# Patient Record
Sex: FEMALE | Race: WHITE | NOT HISPANIC OR LATINO | Employment: OTHER | URBAN - METROPOLITAN AREA
[De-identification: names, ages, dates, MRNs, and addresses within clinical notes are randomized per-mention and may not be internally consistent; named-entity substitution may affect disease eponyms.]

---

## 2020-03-26 ASSESSMENT — MIFFLIN-ST. JEOR
SCORE: 1432.91
SCORE: 1485.08

## 2020-03-29 ENCOUNTER — SURGERY - HEALTHEAST (OUTPATIENT)
Dept: GASTROENTEROLOGY | Facility: HOSPITAL | Age: 85
End: 2020-03-29

## 2020-04-01 ENCOUNTER — HOME CARE/HOSPICE - HEALTHEAST (OUTPATIENT)
Dept: HOME HEALTH SERVICES | Facility: HOME HEALTH | Age: 85
End: 2020-04-01

## 2020-04-02 ENCOUNTER — COMMUNICATION - HEALTHEAST (OUTPATIENT)
Dept: SCHEDULING | Facility: CLINIC | Age: 85
End: 2020-04-02

## 2020-04-04 ENCOUNTER — HOME CARE/HOSPICE - HEALTHEAST (OUTPATIENT)
Dept: HOME HEALTH SERVICES | Facility: HOME HEALTH | Age: 85
End: 2020-04-04

## 2020-04-07 ENCOUNTER — HOME CARE/HOSPICE - HEALTHEAST (OUTPATIENT)
Dept: HOME HEALTH SERVICES | Facility: HOME HEALTH | Age: 85
End: 2020-04-07

## 2020-04-10 ENCOUNTER — HOME CARE/HOSPICE - HEALTHEAST (OUTPATIENT)
Dept: HOME HEALTH SERVICES | Facility: HOME HEALTH | Age: 85
End: 2020-04-10

## 2020-04-11 ENCOUNTER — RECORDS - HEALTHEAST (OUTPATIENT)
Dept: LAB | Facility: HOSPITAL | Age: 85
End: 2020-04-11

## 2020-04-11 ENCOUNTER — HOME CARE/HOSPICE - HEALTHEAST (OUTPATIENT)
Dept: HOME HEALTH SERVICES | Facility: HOME HEALTH | Age: 85
End: 2020-04-11

## 2020-04-11 LAB
ANION GAP SERPL CALCULATED.3IONS-SCNC: 3 MMOL/L (ref 5–18)
BUN SERPL-MCNC: 9 MG/DL (ref 8–28)
CALCIUM SERPL-MCNC: 8.3 MG/DL (ref 8.5–10.5)
CHLORIDE BLD-SCNC: 104 MMOL/L (ref 98–107)
CO2 SERPL-SCNC: 34 MMOL/L (ref 22–31)
CREAT SERPL-MCNC: 0.85 MG/DL (ref 0.6–1.1)
ERYTHROCYTE [DISTWIDTH] IN BLOOD BY AUTOMATED COUNT: 25.3 % (ref 11–14.5)
GFR SERPL CREATININE-BSD FRML MDRD: >60 ML/MIN/1.73M2
GLUCOSE BLD-MCNC: 96 MG/DL (ref 70–125)
HCT VFR BLD AUTO: 30 % (ref 35–47)
HGB BLD-MCNC: 9.5 G/DL (ref 12–16)
MCH RBC QN AUTO: 35.6 PG (ref 27–34)
MCHC RBC AUTO-ENTMCNC: 31.7 G/DL (ref 32–36)
MCV RBC AUTO: 112 FL (ref 80–100)
PLATELET # BLD AUTO: 165 THOU/UL (ref 140–440)
PMV BLD AUTO: 12.3 FL (ref 8.5–12.5)
POTASSIUM BLD-SCNC: 4.4 MMOL/L (ref 3.5–5)
RBC # BLD AUTO: 2.67 MILL/UL (ref 3.8–5.4)
SODIUM SERPL-SCNC: 141 MMOL/L (ref 136–145)
WBC: 3.5 THOU/UL (ref 4–11)

## 2020-04-14 ENCOUNTER — HOME CARE/HOSPICE - HEALTHEAST (OUTPATIENT)
Dept: HOME HEALTH SERVICES | Facility: HOME HEALTH | Age: 85
End: 2020-04-14

## 2020-04-16 ENCOUNTER — HOME CARE/HOSPICE - HEALTHEAST (OUTPATIENT)
Dept: HOME HEALTH SERVICES | Facility: HOME HEALTH | Age: 85
End: 2020-04-16

## 2020-04-17 ENCOUNTER — HOME CARE/HOSPICE - HEALTHEAST (OUTPATIENT)
Dept: HOME HEALTH SERVICES | Facility: HOME HEALTH | Age: 85
End: 2020-04-17

## 2021-04-30 ENCOUNTER — COMMUNICATION - HEALTHEAST (OUTPATIENT)
Dept: SCHEDULING | Facility: CLINIC | Age: 86
End: 2021-04-30

## 2021-05-05 ENCOUNTER — RECORDS - HEALTHEAST (OUTPATIENT)
Dept: LAB | Facility: CLINIC | Age: 86
End: 2021-05-05

## 2021-05-05 ENCOUNTER — OFFICE VISIT - HEALTHEAST (OUTPATIENT)
Dept: GERIATRICS | Facility: CLINIC | Age: 86
End: 2021-05-05

## 2021-05-05 DIAGNOSIS — R53.81 PHYSICAL DECONDITIONING: ICD-10-CM

## 2021-05-05 DIAGNOSIS — R52 PAIN MANAGEMENT: ICD-10-CM

## 2021-05-05 DIAGNOSIS — K59.00 CONSTIPATION, UNSPECIFIED CONSTIPATION TYPE: ICD-10-CM

## 2021-05-05 DIAGNOSIS — S42.252A DISPLACED FRACTURE OF GREATER TUBEROSITY OF LEFT HUMERUS, INITIAL ENCOUNTER FOR CLOSED FRACTURE: ICD-10-CM

## 2021-05-06 ENCOUNTER — RECORDS - HEALTHEAST (OUTPATIENT)
Dept: LAB | Facility: CLINIC | Age: 86
End: 2021-05-06

## 2021-05-06 ENCOUNTER — COMMUNICATION - HEALTHEAST (OUTPATIENT)
Dept: GERIATRICS | Facility: CLINIC | Age: 86
End: 2021-05-06

## 2021-05-06 ENCOUNTER — COMMUNICATION - HEALTHEAST (OUTPATIENT)
Dept: SCHEDULING | Facility: CLINIC | Age: 86
End: 2021-05-06

## 2021-05-06 LAB — POTASSIUM BLD-SCNC: 6.2 MMOL/L (ref 3.5–5)

## 2021-05-07 ENCOUNTER — OFFICE VISIT - HEALTHEAST (OUTPATIENT)
Dept: GERIATRICS | Facility: CLINIC | Age: 86
End: 2021-05-07

## 2021-05-07 ENCOUNTER — RECORDS - HEALTHEAST (OUTPATIENT)
Dept: LAB | Facility: CLINIC | Age: 86
End: 2021-05-07

## 2021-05-07 DIAGNOSIS — E87.5 HYPERKALEMIA: ICD-10-CM

## 2021-05-07 LAB — POTASSIUM BLD-SCNC: 5.3 MMOL/L (ref 3.5–5)

## 2021-05-08 LAB — POTASSIUM BLD-SCNC: 4.5 MMOL/L (ref 3.5–5)

## 2021-05-11 ENCOUNTER — OFFICE VISIT - HEALTHEAST (OUTPATIENT)
Dept: GERIATRICS | Facility: CLINIC | Age: 86
End: 2021-05-11

## 2021-05-11 ENCOUNTER — RECORDS - HEALTHEAST (OUTPATIENT)
Dept: LAB | Facility: CLINIC | Age: 86
End: 2021-05-11

## 2021-05-11 DIAGNOSIS — S42.202D CLOSED FRACTURE OF PROXIMAL END OF LEFT HUMERUS WITH ROUTINE HEALING, UNSPECIFIED FRACTURE MORPHOLOGY, SUBSEQUENT ENCOUNTER: ICD-10-CM

## 2021-05-11 DIAGNOSIS — I10 ESSENTIAL HYPERTENSION: ICD-10-CM

## 2021-05-11 DIAGNOSIS — D64.9 ANEMIA, UNSPECIFIED TYPE: ICD-10-CM

## 2021-05-11 DIAGNOSIS — E03.9 HYPOTHYROIDISM, UNSPECIFIED TYPE: ICD-10-CM

## 2021-05-11 LAB
SARS-COV-2 PCR COMMENT: NORMAL
SARS-COV-2 RNA SPEC QL NAA+PROBE: NEGATIVE
SARS-COV-2 VIRUS SPECIMEN SOURCE: NORMAL

## 2021-05-18 ENCOUNTER — OFFICE VISIT - HEALTHEAST (OUTPATIENT)
Dept: GERIATRICS | Facility: CLINIC | Age: 86
End: 2021-05-18

## 2021-05-18 DIAGNOSIS — I10 ESSENTIAL HYPERTENSION: ICD-10-CM

## 2021-05-18 DIAGNOSIS — D64.9 ANEMIA, UNSPECIFIED TYPE: ICD-10-CM

## 2021-05-18 DIAGNOSIS — S42.202D CLOSED FRACTURE OF PROXIMAL END OF LEFT HUMERUS WITH ROUTINE HEALING, UNSPECIFIED FRACTURE MORPHOLOGY, SUBSEQUENT ENCOUNTER: ICD-10-CM

## 2021-05-18 DIAGNOSIS — E03.9 HYPOTHYROIDISM, UNSPECIFIED TYPE: ICD-10-CM

## 2021-05-21 ENCOUNTER — OFFICE VISIT - HEALTHEAST (OUTPATIENT)
Dept: GERIATRICS | Facility: CLINIC | Age: 86
End: 2021-05-21

## 2021-05-21 DIAGNOSIS — I10 ESSENTIAL HYPERTENSION: ICD-10-CM

## 2021-05-21 DIAGNOSIS — D64.9 ANEMIA, UNSPECIFIED TYPE: ICD-10-CM

## 2021-05-21 DIAGNOSIS — E03.9 HYPOTHYROIDISM, UNSPECIFIED TYPE: ICD-10-CM

## 2021-05-21 DIAGNOSIS — S42.202D CLOSED FRACTURE OF PROXIMAL END OF LEFT HUMERUS WITH ROUTINE HEALING, UNSPECIFIED FRACTURE MORPHOLOGY, SUBSEQUENT ENCOUNTER: ICD-10-CM

## 2021-05-24 ENCOUNTER — OFFICE VISIT - HEALTHEAST (OUTPATIENT)
Dept: GERIATRICS | Facility: CLINIC | Age: 86
End: 2021-05-24

## 2021-05-24 DIAGNOSIS — R53.81 PHYSICAL DECONDITIONING: ICD-10-CM

## 2021-05-24 DIAGNOSIS — S42.252A DISPLACED FRACTURE OF GREATER TUBEROSITY OF LEFT HUMERUS, INITIAL ENCOUNTER FOR CLOSED FRACTURE: ICD-10-CM

## 2021-05-24 DIAGNOSIS — R52 PAIN MANAGEMENT: ICD-10-CM

## 2021-05-26 ENCOUNTER — AMBULATORY - HEALTHEAST (OUTPATIENT)
Dept: GERIATRICS | Facility: CLINIC | Age: 86
End: 2021-05-26

## 2021-05-26 VITALS
HEART RATE: 89 BPM | SYSTOLIC BLOOD PRESSURE: 118 MMHG | OXYGEN SATURATION: 94 % | TEMPERATURE: 97.9 F | RESPIRATION RATE: 18 BRPM | DIASTOLIC BLOOD PRESSURE: 60 MMHG

## 2021-05-27 VITALS
TEMPERATURE: 98 F | OXYGEN SATURATION: 93 % | WEIGHT: 212.6 LBS | SYSTOLIC BLOOD PRESSURE: 141 MMHG | HEART RATE: 82 BPM | RESPIRATION RATE: 20 BRPM | DIASTOLIC BLOOD PRESSURE: 90 MMHG

## 2021-05-27 VITALS
TEMPERATURE: 98.3 F | HEART RATE: 89 BPM | SYSTOLIC BLOOD PRESSURE: 124 MMHG | OXYGEN SATURATION: 93 % | DIASTOLIC BLOOD PRESSURE: 68 MMHG

## 2021-05-27 VITALS
TEMPERATURE: 97.9 F | DIASTOLIC BLOOD PRESSURE: 60 MMHG | SYSTOLIC BLOOD PRESSURE: 124 MMHG | OXYGEN SATURATION: 93 % | HEART RATE: 88 BPM

## 2021-05-27 VITALS
OXYGEN SATURATION: 96 % | RESPIRATION RATE: 20 BRPM | HEART RATE: 84 BPM | DIASTOLIC BLOOD PRESSURE: 87 MMHG | WEIGHT: 215.8 LBS | TEMPERATURE: 98 F | SYSTOLIC BLOOD PRESSURE: 167 MMHG

## 2021-06-03 ENCOUNTER — COMMUNICATION - HEALTHEAST (OUTPATIENT)
Dept: NEUROLOGY | Facility: CLINIC | Age: 86
End: 2021-06-03

## 2021-06-03 ENCOUNTER — COMMUNICATION - HEALTHEAST (OUTPATIENT)
Dept: SCHEDULING | Facility: CLINIC | Age: 86
End: 2021-06-03

## 2021-06-03 DIAGNOSIS — S32.020A CLOSED COMPRESSION FRACTURE OF L2 VERTEBRA, INITIAL ENCOUNTER (H): ICD-10-CM

## 2021-06-04 VITALS
TEMPERATURE: 98.1 F | DIASTOLIC BLOOD PRESSURE: 58 MMHG | OXYGEN SATURATION: 92 % | BODY MASS INDEX: 38.97 KG/M2 | HEART RATE: 102 BPM | WEIGHT: 220 LBS | SYSTOLIC BLOOD PRESSURE: 112 MMHG | RESPIRATION RATE: 20 BRPM

## 2021-06-04 VITALS
TEMPERATURE: 97.6 F | RESPIRATION RATE: 19 BRPM | WEIGHT: 230 LBS | DIASTOLIC BLOOD PRESSURE: 58 MMHG | HEART RATE: 98 BPM | SYSTOLIC BLOOD PRESSURE: 118 MMHG | BODY MASS INDEX: 40.74 KG/M2 | OXYGEN SATURATION: 92 %

## 2021-06-04 VITALS — HEIGHT: 63 IN | BODY MASS INDEX: 41.5 KG/M2 | WEIGHT: 234.2 LBS

## 2021-06-07 NOTE — TELEPHONE ENCOUNTER
Grace (daughter) calling- patient was discharged from the hospital today- on Lisinopril for blood pressure- in the hospital they switched her Lisinopril to AM dose. She received a dose of Lisinopril this morning. Patient just took another dose this evening. Caller wants to know if that is harmful, and if it's going to make her dizzy at all, what to watch for. Says patient has been on the medication for a long time. Advised that I would like her to speak with a pharmacist at [facility] control to advise her what to watch for due to taking extra dose. Transferred caller to [facility] Control.     Carmen Thomas, RN/DOUG [facility]    Reason for Disposition    [1] DOUBLE DOSE (an extra dose or lesser amount) of prescription drug AND [2] any symptoms (e.g., dizziness, nausea, pain, sleepiness)    Protocols used: MEDICATION QUESTION CALL-A-

## 2021-06-07 NOTE — PROGRESS NOTES
"Writer called pt to schedule for tomorrows SNV. Pt asked \"what for?\". Writer explained this was a regularly scheduled visit for assessment and also to discharge her from [facility].  Patient then consented to the appointment at 9 am.  "

## 2021-06-08 ENCOUNTER — OFFICE VISIT - HEALTHEAST (OUTPATIENT)
Dept: GERIATRICS | Facility: CLINIC | Age: 86
End: 2021-06-08

## 2021-06-08 DIAGNOSIS — K59.03 THERAPEUTIC OPIOID INDUCED CONSTIPATION: ICD-10-CM

## 2021-06-08 DIAGNOSIS — G56.21 ULNAR NEUROPATHY OF RIGHT UPPER EXTREMITY: ICD-10-CM

## 2021-06-08 DIAGNOSIS — E03.9 HYPOTHYROIDISM, UNSPECIFIED TYPE: ICD-10-CM

## 2021-06-08 DIAGNOSIS — Z87.81 HISTORY OF HUMERUS FRACTURE: ICD-10-CM

## 2021-06-08 DIAGNOSIS — S32.020A CLOSED COMPRESSION FRACTURE OF L2 VERTEBRA, INITIAL ENCOUNTER (H): ICD-10-CM

## 2021-06-08 DIAGNOSIS — R53.81 PHYSICAL DECONDITIONING: ICD-10-CM

## 2021-06-08 DIAGNOSIS — I10 ESSENTIAL HYPERTENSION: ICD-10-CM
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
## 2021-06-08 DIAGNOSIS — T40.2X5A THERAPEUTIC OPIOID INDUCED CONSTIPATION: ICD-10-CM

## 2021-06-08 DIAGNOSIS — Z87.19 HISTORY OF GI BLEED: ICD-10-CM

## 2021-06-08 ASSESSMENT — MIFFLIN-ST. JEOR: SCORE: 1371.68

## 2021-06-15 ENCOUNTER — RECORDS - HEALTHEAST (OUTPATIENT)
Dept: LAB | Facility: CLINIC | Age: 86
End: 2021-06-15

## 2021-06-15 ASSESSMENT — MIFFLIN-ST. JEOR: SCORE: 1314.98

## 2021-06-16 ENCOUNTER — OFFICE VISIT - HEALTHEAST (OUTPATIENT)
Dept: GERIATRICS | Facility: CLINIC | Age: 86
End: 2021-06-16

## 2021-06-16 DIAGNOSIS — I12.9 BENIGN HYPERTENSIVE KIDNEY DISEASE WITH CHRONIC KIDNEY DISEASE STAGE I THROUGH STAGE IV, OR UNSPECIFIED: ICD-10-CM

## 2021-06-16 DIAGNOSIS — S42.202D CLOSED FRACTURE OF PROXIMAL END OF LEFT HUMERUS WITH ROUTINE HEALING, UNSPECIFIED FRACTURE MORPHOLOGY, SUBSEQUENT ENCOUNTER: ICD-10-CM

## 2021-06-16 DIAGNOSIS — G47.01 INSOMNIA DUE TO MEDICAL CONDITION: ICD-10-CM

## 2021-06-16 DIAGNOSIS — R53.81 PHYSICAL DECONDITIONING: ICD-10-CM

## 2021-06-16 DIAGNOSIS — K59.01 SLOW TRANSIT CONSTIPATION: ICD-10-CM

## 2021-06-16 DIAGNOSIS — M10.9 GOUT, UNSPECIFIED CAUSE, UNSPECIFIED CHRONICITY, UNSPECIFIED SITE: ICD-10-CM

## 2021-06-16 DIAGNOSIS — N18.30 STAGE 3 CHRONIC KIDNEY DISEASE, UNSPECIFIED WHETHER STAGE 3A OR 3B CKD (H): ICD-10-CM

## 2021-06-16 DIAGNOSIS — S32.020D CLOSED COMPRESSION FRACTURE OF L2 LUMBAR VERTEBRA WITH ROUTINE HEALING, SUBSEQUENT ENCOUNTER: ICD-10-CM

## 2021-06-16 DIAGNOSIS — Z87.19 HX OF DUODENAL ULCER: ICD-10-CM

## 2021-06-16 DIAGNOSIS — E03.9 HYPOTHYROIDISM, UNSPECIFIED TYPE: ICD-10-CM

## 2021-06-16 PROBLEM — K92.1 GASTROINTESTINAL HEMORRHAGE WITH MELENA: Status: ACTIVE | Noted: 2020-03-26

## 2021-06-16 PROBLEM — D53.9 MACROCYTIC ANEMIA: Status: ACTIVE | Noted: 2019-05-03

## 2021-06-16 PROBLEM — K92.2 GI BLEED: Status: ACTIVE | Noted: 2020-03-26

## 2021-06-16 PROBLEM — K26.9 DUODENAL ULCER DUE TO NONSTEROIDAL ANTI-INFLAMMATORY DRUG (NSAID): Status: ACTIVE | Noted: 2020-04-03

## 2021-06-16 PROBLEM — Z87.81 HISTORY OF HUMERUS FRACTURE: Status: ACTIVE | Noted: 2021-04-29

## 2021-06-16 PROBLEM — E66.01 MORBID OBESITY (H): Status: ACTIVE | Noted: 2021-05-25

## 2021-06-16 PROBLEM — S32.020A CLOSED COMPRESSION FRACTURE OF L2 VERTEBRA, INITIAL ENCOUNTER (H): Status: ACTIVE | Noted: 2021-06-02

## 2021-06-16 PROBLEM — S42.252A DISPLACED FRACTURE OF GREATER TUBEROSITY OF LEFT HUMERUS, INITIAL ENCOUNTER FOR CLOSED FRACTURE: Status: ACTIVE | Noted: 2021-04-29

## 2021-06-16 PROBLEM — R06.09 DOE (DYSPNEA ON EXERTION): Status: ACTIVE | Noted: 2018-04-03

## 2021-06-16 PROBLEM — T39.395A DUODENAL ULCER DUE TO NONSTEROIDAL ANTI-INFLAMMATORY DRUG (NSAID): Status: ACTIVE | Noted: 2020-04-03

## 2021-06-16 LAB — HGB BLD-MCNC: 9.9 G/DL (ref 12–16)

## 2021-06-17 NOTE — TELEPHONE ENCOUNTER
Telephone Encounter by Tasia Abarca RN at 5/6/2021 12:14 PM     Author: Tasia Abarca RN Service: -- Author Type: Registered Nurse    Filed: 5/6/2021 12:18 PM Encounter Date: 5/6/2021 Status: Signed    : Tasia Abarca RN (Registered Nurse)       [facility] Nurse Triage Telephone Note      Provider: KELLY Montejo  Facility: [facility] Type: TCU    Caller: Lily  Call Back Number:  [phone]    Allergies: Hydrocodone-acetaminophen, Amoxicillin, Hydrochlorothiazide, and Levofloxacin    Reason for call: Lab result for potassium today 5/6:       Verbal Order/Direction given by Provider: Give Kayexalate 60g PO one time only. Recheck Potassium in the morning 5/7/21.    Provider giving order: KELLY Montejo    Verbal order given to: Lily Abarca RN

## 2021-06-17 NOTE — PROGRESS NOTES
[facility]    Facility:   [facility] [[id number]]   Code Status: FULL CODE      CHIEF COMPLAINT/REASON FOR VISIT:  Chief Complaint   Patient presents with     Review Of Multiple Medical Conditions     review VS, labs , pain management F/U left humerus fracture        HISTORY:      HPI: Sussy is a 85 y.o. female undergoing physical and occupational therapy at [facility].  She is with past medical history of dyspnea on exertion, hypothyroidism, hypertension, osteoarthritis of both knees, venous stasis dermatitis of both lower extremities who admitted to the emergency room on 4/29/2021 following a mechanical fall.  She sustained a left humerus fracture which at this time is nonoperative.  Continue pain control and follow-up with orthopedics within 1 week.    Today she is seen to review vital signs, labs, follow-up Left humerus fracture,constipation  pain management and to establish care.  She denied chest pain shortness of breath cough or congestion.  She did report slight constipation and was started on senna S twice daily.  She is having pain 10 out of 10 however her pain is more in her left hip than her left humerus fracture.  Lidocaine patch ordered for left hip.  She was also noted to have a slightly elevated potassium level on 5 3 of 5.1 and this will be rechecked in the a.m.  Last hemoglobin was 9.  She will follow-up with orthopedics in 1 week.    Past Medical History:   Diagnosis Date     HTN (hypertension)      Osteoporosis      Thyroid disease              No family history on file.  Social History     Socioeconomic History     Marital status:      Spouse name: Not on file     Number of children: Not on file     Years of education: Not on file     Highest education level: Not on file   Occupational History     Not on file   Social Needs     Financial resource strain: Not on file     Food insecurity     Worry: Not on file      Inability: Not on file     Transportation needs     Medical: Not on file     Non-medical: Not on file   Tobacco Use     Smoking status: Never Smoker   Substance and Sexual Activity     Alcohol use: No     Drug use: No     Sexual activity: Not on file   Lifestyle     Physical activity     Days per week: Not on file     Minutes per session: Not on file     Stress: Not on file   Relationships     Social connections     Talks on phone: Not on file     Gets together: Not on file     Attends Buddhist service: Not on file     Active member of club or organization: Not on file     Attends meetings of clubs or organizations: Not on file     Relationship status: Not on file     Intimate partner violence     Fear of current or ex partner: Not on file     Emotionally abused: Not on file     Physically abused: Not on file     Forced sexual activity: Not on file   Other Topics Concern     Not on file   Social History Narrative     Not on file         Review of Systems   Constitutional: Positive for activity change. Negative for appetite change, fatigue and fever.        Nonweightbearing left upper extremity due to a nonoperative left humerus fracture   HENT: Negative for congestion.    Respiratory: Negative for cough, shortness of breath and wheezing.    Cardiovascular: Negative for chest pain and leg swelling.   Gastrointestinal: Positive for constipation. Negative for abdominal distention, abdominal pain, diarrhea and nausea.   Genitourinary: Negative for dysuria.   Musculoskeletal: Positive for arthralgias. Negative for back pain.   Skin: Negative for color change and wound.   Neurological: Negative for dizziness.   Psychiatric/Behavioral: Negative for agitation, behavioral problems and confusion.       Vitals:    05/05/21 0847   BP: 167/87   Pulse: 84   Resp: 20   Temp: 98  F (36.7  C)   SpO2: 96%   Weight: 215 lb 12.8 oz (97.9 kg)       Physical Exam  Constitutional:       Appearance: She is well-developed.      Comments:  Pleasant woman in no acute distress   HENT:      Head: Normocephalic.   Eyes:      Conjunctiva/sclera: Conjunctivae normal.   Neck:      Musculoskeletal: Normal range of motion.   Cardiovascular:      Rate and Rhythm: Normal rate and regular rhythm.      Heart sounds: Normal heart sounds. No murmur.   Pulmonary:      Effort: No respiratory distress.      Breath sounds: Normal breath sounds. No wheezing or rales.   Abdominal:      General: Bowel sounds are normal. There is no distension.      Palpations: Abdomen is soft.      Tenderness: There is no abdominal tenderness.   Musculoskeletal: Normal range of motion.      Comments: Left humerus fracture arm in a sling   Skin:     General: Skin is warm.      Findings: Bruising present.      Comments: Left hip   Neurological:      Mental Status: She is alert and oriented to person, place, and time.   Psychiatric:         Behavior: Behavior normal.           LABS:   Recent Results (from the past 240 hour(s))   Asymptomatic SARS-CoV-2 (COVID-19)-PCR    Specimen: Respiratory   Result Value Ref Range    SARS-CoV-2 PCR Result Negative Negative, Invalid   CK   Result Value Ref Range    CK, Total 34 30 - 190 U/L   Basic Metabolic Panel   Result Value Ref Range    Sodium 141 136 - 145 mmol/L    Potassium 4.3 3.5 - 5.0 mmol/L    Chloride 107 98 - 107 mmol/L    CO2 22 22 - 31 mmol/L    Anion Gap, Calculation 12 5 - 18 mmol/L    Glucose 145 (H) 70 - 125 mg/dL    Calcium 8.7 8.5 - 10.5 mg/dL    BUN 31 (H) 8 - 28 mg/dL    Creatinine 0.99 0.60 - 1.10 mg/dL    GFR MDRD Af Amer >60 >60 mL/min/1.73m2    GFR MDRD Non Af Amer 54 (L) >60 mL/min/1.73m2   ECG 12 lead nursing unit performed   Result Value Ref Range    SYSTOLIC BLOOD PRESSURE      DIASTOLIC BLOOD PRESSURE      VENTRICULAR RATE 103 BPM    ATRIAL RATE 103 BPM    P-R INTERVAL 164 ms    QRS DURATION 88 ms    Q-T INTERVAL 344 ms    QTC CALCULATION (BEZET) 450 ms    P Axis 68 degrees    R AXIS 21 degrees    T AXIS 35 degrees    MUSE  DIAGNOSIS       Sinus tachycardia  Otherwise normal ECG  When compared with ECG of 26-MAR-2020 14:37,  No significant change was found  Confirmed by SEE ED PROVIDER NOTE FOR, ECG INTERPRETATION (4000),  Sohail Sauceda ([id number]) on 4/30/2021 7:44:02 AM     HM2 (CBC W/O DIFF)   Result Value Ref Range    WBC 12.1 (H) 4.0 - 11.0 thou/uL    RBC 2.91 (L) 3.80 - 5.40 mill/uL    Hemoglobin 9.8 (L) 12.0 - 16.0 g/dL    Hematocrit 31.3 (L) 35.0 - 47.0 %     (H) 80 - 100 fL    MCH 33.7 27.0 - 34.0 pg    MCHC 31.3 (L) 32.0 - 36.0 g/dL    RDW 15.1 (H) 11.0 - 14.5 %    Platelets 179 140 - 440 thou/uL    MPV 13.1 (H) 8.5 - 12.5 fL   BNP(B-type Natriuretic Peptide)   Result Value Ref Range    BNP 30 0 - 167 pg/mL   Magnesium   Result Value Ref Range    Magnesium 1.5 (L) 1.8 - 2.6 mg/dL   Potassium   Result Value Ref Range    Potassium 4.9 3.5 - 5.0 mmol/L   Basic Metabolic Panel   Result Value Ref Range    Sodium 136 136 - 145 mmol/L    Potassium 4.7 3.5 - 5.0 mmol/L    Chloride 103 98 - 107 mmol/L    CO2 27 22 - 31 mmol/L    Anion Gap, Calculation 6 5 - 18 mmol/L    Glucose 107 70 - 125 mg/dL    Calcium 8.9 8.5 - 10.5 mg/dL    BUN 32 (H) 8 - 28 mg/dL    Creatinine 1.19 (H) 0.60 - 1.10 mg/dL    GFR MDRD Af Amer 53 (L) >60 mL/min/1.73m2    GFR MDRD Non Af Amer 43 (L) >60 mL/min/1.73m2   HM2(CBC w/o Differential)   Result Value Ref Range    WBC 9.2 4.0 - 11.0 thou/uL    RBC 2.61 (L) 3.80 - 5.40 mill/uL    Hemoglobin 9.0 (L) 12.0 - 16.0 g/dL    Hematocrit 28.0 (L) 35.0 - 47.0 %     (H) 80 - 100 fL    MCH 34.5 (H) 27.0 - 34.0 pg    MCHC 32.1 32.0 - 36.0 g/dL    RDW 15.4 (H) 11.0 - 14.5 %    Platelets 167 140 - 440 thou/uL    MPV 13.5 (H) 8.5 - 12.5 fL   Magnesium   Result Value Ref Range    Magnesium 2.8 (H) 1.8 - 2.6 mg/dL   Magnesium   Result Value Ref Range    Magnesium 2.5 1.8 - 2.6 mg/dL   Potassium - Next AM   Result Value Ref Range    Potassium 4.6 3.5 - 5.0 mmol/L   Potassium   Result Value Ref Range     Potassium 5.1 (H) 3.5 - 5.0 mmol/L   Magnesium   Result Value Ref Range    Magnesium 2.5 1.8 - 2.6 mg/dL   Asymptomatic SARS-CoV-2 (COVID-19)-PCR    Specimen: Respiratory   Result Value Ref Range    SARS-CoV-2 PCR Result Negative Negative, Invalid     Current Outpatient Medications   Medication Sig     acetaminophen (TYLENOL) 500 MG tablet Take 2 tablets (1,000 mg total) by mouth 3 (three) times a day. (Patient taking differently: Take 1,000 mg by mouth every 6 (six) hours. )     allopurinoL (ZYLOPRIM) 100 MG tablet Take 100 mg by mouth 2 (two) times a day.     calcium carbonate (OS-CARMELA) 500 mg calcium (1,250 mg) chewable tablet Chew 1 tablet daily.     calcium-vitamin D3-vitamin K (VIACTIV) 650 mg-12.5 mcg-40 mcg Chew Chew 1 tablet every evening.     clotrimazole-betamethasone (LOTRISONE) cream Apply 1 application topically 2 (two) times a day as needed (feet).     hydrocortisone 1 % cream Apply topically 3 (three) times a day as needed. Apply to skin rash lesions as needed     HYDROmorphone (DILAUDID) 2 MG tablet Take 0.5-1 tablets (1-2 mg total) by mouth every 6 (six) hours as needed for pain.     levothyroxine (SYNTHROID, LEVOTHROID) 112 MCG tablet Take 112 mcg by mouth Daily at 6:00 am.      lidocaine 4 % patch Place 1 patch on the skin daily. Remove and discard patch with 12 hours or as directed by MD.Apply to left hip     lisinopriL (PRINIVIL,ZESTRIL) 20 MG tablet Take 20 mg by mouth every evening.      melatonin 3 mg Tab tablet Take 1 tablet (3 mg total) by mouth at bedtime as needed.     mometasone (ELOCON) 0.1 % cream Apply 1 application topically daily as needed (skin under eye).     multivitamin with minerals (THERA-M) 9 mg iron-400 mcg Tab tablet Take 1 tablet by mouth daily.     omeprazole (PRILOSEC) 20 MG capsule Take 1 capsule (20 mg total) by mouth 2 (two) times a day before meals.     senna-docusate (SENNOSIDES-DOCUSATE SODIUM) 8.6-50 mg tablet Take 1 tablet by mouth 2 (two) times a day.      ASSESSMENT:      ICD-10-CM    1. Displaced fracture of greater tuberosity of left humerus, initial encounter for closed fracture  S42.252A    2. Pain management  R52    3. Physical deconditioning  R53.81    4. Constipation, unspecified constipation type  K59.00        PLAN:    Left humerus fracture-nonoperative, follow-up with orthopedics within 1 week, pain control sling on at all times except for hygiene    Pain management increase scheduled extra strength Tylenol  to q 6 hour scontinue  as needed Dilaudid, Lidocaine patch left hip    Physical deconditioning PT OT    GERD continue Prilosec    Gout on allopurinol    Hypothyroidism continue levothyroxine TSh 1.40 in 9/2020    hypertension on lisinopril    Insomnia continue melatonin    Constipation- Senna S two times a day           Electronically signed by: Alyce Mora CNP

## 2021-06-17 NOTE — PROGRESS NOTES
[facility]    Facility:   [facility] [[id number]]   Code Status: FULL CODE      CHIEF COMPLAINT/REASON FOR VISIT:  Chief Complaint   Patient presents with     Problem Visit     hyperkalemia       HISTORY:      HPI: Sussy is a 85 y.o. female undergoing physical and occupational therapy at [facility].  She is with past medical history of dyspnea on exertion, hypothyroidism, hypertension, osteoarthritis of both knees, venous stasis dermatitis of both lower extremities who admitted to the emergency room on 4/29/2021 following a mechanical fall.  She sustained a left humerus fracture which at this time is nonoperative.  Continue pain control and follow-up with orthopedics     Today she is seen to review vital signs and hyperkalemia. Pt was with hyperkalemia at 6.1. She was given kayexalate and potassium came down to 5.3. She will receive another dose today and lab to be checked in the AM. Pt did report to writer that she eats 2 bananas a day. She was educated on high potassium foods. She denied chest pain shortness of breath cough or congestion.    Last hemoglobin was 9.  She will follow-up with orthopedics     Past Medical History:   Diagnosis Date     HTN (hypertension)      Osteoporosis      Thyroid disease              No family history on file.  Social History     Socioeconomic History     Marital status:      Spouse name: Not on file     Number of children: Not on file     Years of education: Not on file     Highest education level: Not on file   Occupational History     Not on file   Social Needs     Financial resource strain: Not on file     Food insecurity     Worry: Not on file     Inability: Not on file     Transportation needs     Medical: Not on file     Non-medical: Not on file   Tobacco Use     Smoking status: Never Smoker   Substance and Sexual Activity     Alcohol use: No     Drug use: No     Sexual activity: Not on file    Lifestyle     Physical activity     Days per week: Not on file     Minutes per session: Not on file     Stress: Not on file   Relationships     Social connections     Talks on phone: Not on file     Gets together: Not on file     Attends Shinto service: Not on file     Active member of club or organization: Not on file     Attends meetings of clubs or organizations: Not on file     Relationship status: Not on file     Intimate partner violence     Fear of current or ex partner: Not on file     Emotionally abused: Not on file     Physically abused: Not on file     Forced sexual activity: Not on file   Other Topics Concern     Not on file   Social History Narrative     Not on file         Review of Systems   Constitutional: Positive for activity change. Negative for appetite change, fatigue and fever.        Nonweightbearing left upper extremity due to a nonoperative left humerus fracture   HENT: Negative for congestion.    Respiratory: Negative for cough, shortness of breath and wheezing.    Cardiovascular: Negative for chest pain and leg swelling.   Gastrointestinal: Positive for constipation. Negative for abdominal distention, abdominal pain, diarrhea and nausea.   Genitourinary: Negative for dysuria.   Musculoskeletal: Positive for arthralgias. Negative for back pain.   Skin: Negative for color change and wound.   Neurological: Negative for dizziness.   Psychiatric/Behavioral: Negative for agitation, behavioral problems and confusion.       Vitals:    05/07/21 1225   BP: 141/90   Pulse: 82   Resp: 20   Temp: 98  F (36.7  C)   SpO2: 93%   Weight: 212 lb 9.6 oz (96.4 kg)       Physical Exam  Constitutional:       Appearance: She is well-developed.      Comments: Pleasant woman in no acute distress   HENT:      Head: Normocephalic.   Eyes:      Conjunctiva/sclera: Conjunctivae normal.   Neck:      Musculoskeletal: Normal range of motion.   Cardiovascular:      Rate and Rhythm: Normal rate and regular rhythm.       Heart sounds: Normal heart sounds. No murmur.   Pulmonary:      Effort: No respiratory distress.      Breath sounds: Normal breath sounds. No wheezing or rales.   Abdominal:      General: Bowel sounds are normal. There is no distension.      Palpations: Abdomen is soft.      Tenderness: There is no abdominal tenderness.   Musculoskeletal: Normal range of motion.      Comments: Left humerus fracture arm in a sling   Skin:     General: Skin is warm.      Findings: Bruising present.      Comments: Left hip   Neurological:      Mental Status: She is alert and oriented to person, place, and time.   Psychiatric:         Behavior: Behavior normal.           LABS:   Recent Results (from the past 240 hour(s))   Asymptomatic SARS-CoV-2 (COVID-19)-PCR    Specimen: Respiratory   Result Value Ref Range    SARS-CoV-2 PCR Result Negative Negative, Invalid   CK   Result Value Ref Range    CK, Total 34 30 - 190 U/L   Basic Metabolic Panel   Result Value Ref Range    Sodium 141 136 - 145 mmol/L    Potassium 4.3 3.5 - 5.0 mmol/L    Chloride 107 98 - 107 mmol/L    CO2 22 22 - 31 mmol/L    Anion Gap, Calculation 12 5 - 18 mmol/L    Glucose 145 (H) 70 - 125 mg/dL    Calcium 8.7 8.5 - 10.5 mg/dL    BUN 31 (H) 8 - 28 mg/dL    Creatinine 0.99 0.60 - 1.10 mg/dL    GFR MDRD Af Amer >60 >60 mL/min/1.73m2    GFR MDRD Non Af Amer 54 (L) >60 mL/min/1.73m2   ECG 12 lead nursing unit performed   Result Value Ref Range    SYSTOLIC BLOOD PRESSURE      DIASTOLIC BLOOD PRESSURE      VENTRICULAR RATE 103 BPM    ATRIAL RATE 103 BPM    P-R INTERVAL 164 ms    QRS DURATION 88 ms    Q-T INTERVAL 344 ms    QTC CALCULATION (BEZET) 450 ms    P Axis 68 degrees    R AXIS 21 degrees    T AXIS 35 degrees    MUSE DIAGNOSIS       Sinus tachycardia  Otherwise normal ECG  When compared with ECG of 26-MAR-2020 14:37,  No significant change was found  Confirmed by SEE ED PROVIDER NOTE FOR, ECG INTERPRETATION (4000),  Sohail Sauceda ([id number]) on 4/30/2021  7:44:02 AM     HM2 (CBC W/O DIFF)   Result Value Ref Range    WBC 12.1 (H) 4.0 - 11.0 thou/uL    RBC 2.91 (L) 3.80 - 5.40 mill/uL    Hemoglobin 9.8 (L) 12.0 - 16.0 g/dL    Hematocrit 31.3 (L) 35.0 - 47.0 %     (H) 80 - 100 fL    MCH 33.7 27.0 - 34.0 pg    MCHC 31.3 (L) 32.0 - 36.0 g/dL    RDW 15.1 (H) 11.0 - 14.5 %    Platelets 179 140 - 440 thou/uL    MPV 13.1 (H) 8.5 - 12.5 fL   BNP(B-type Natriuretic Peptide)   Result Value Ref Range    BNP 30 0 - 167 pg/mL   Magnesium   Result Value Ref Range    Magnesium 1.5 (L) 1.8 - 2.6 mg/dL   Potassium   Result Value Ref Range    Potassium 4.9 3.5 - 5.0 mmol/L   Basic Metabolic Panel   Result Value Ref Range    Sodium 136 136 - 145 mmol/L    Potassium 4.7 3.5 - 5.0 mmol/L    Chloride 103 98 - 107 mmol/L    CO2 27 22 - 31 mmol/L    Anion Gap, Calculation 6 5 - 18 mmol/L    Glucose 107 70 - 125 mg/dL    Calcium 8.9 8.5 - 10.5 mg/dL    BUN 32 (H) 8 - 28 mg/dL    Creatinine 1.19 (H) 0.60 - 1.10 mg/dL    GFR MDRD Af Amer 53 (L) >60 mL/min/1.73m2    GFR MDRD Non Af Amer 43 (L) >60 mL/min/1.73m2   HM2(CBC w/o Differential)   Result Value Ref Range    WBC 9.2 4.0 - 11.0 thou/uL    RBC 2.61 (L) 3.80 - 5.40 mill/uL    Hemoglobin 9.0 (L) 12.0 - 16.0 g/dL    Hematocrit 28.0 (L) 35.0 - 47.0 %     (H) 80 - 100 fL    MCH 34.5 (H) 27.0 - 34.0 pg    MCHC 32.1 32.0 - 36.0 g/dL    RDW 15.4 (H) 11.0 - 14.5 %    Platelets 167 140 - 440 thou/uL    MPV 13.5 (H) 8.5 - 12.5 fL   Magnesium   Result Value Ref Range    Magnesium 2.8 (H) 1.8 - 2.6 mg/dL   Magnesium   Result Value Ref Range    Magnesium 2.5 1.8 - 2.6 mg/dL   Potassium - Next AM   Result Value Ref Range    Potassium 4.6 3.5 - 5.0 mmol/L   Potassium   Result Value Ref Range    Potassium 5.1 (H) 3.5 - 5.0 mmol/L   Magnesium   Result Value Ref Range    Magnesium 2.5 1.8 - 2.6 mg/dL   Asymptomatic SARS-CoV-2 (COVID-19)-PCR    Specimen: Respiratory   Result Value Ref Range    SARS-CoV-2 PCR Result Negative Negative, Invalid    Potassium   Result Value Ref Range    Potassium 6.2 (HH) 3.5 - 5.0 mmol/L   Potassium   Result Value Ref Range    Potassium 5.3 (H) 3.5 - 5.0 mmol/L     Current Outpatient Medications   Medication Sig     acetaminophen (TYLENOL) 500 MG tablet Take 2 tablets (1,000 mg total) by mouth 3 (three) times a day. (Patient taking differently: Take 1,000 mg by mouth every 6 (six) hours. )     allopurinoL (ZYLOPRIM) 100 MG tablet Take 100 mg by mouth 2 (two) times a day.     calcium carbonate (OS-CARMELA) 500 mg calcium (1,250 mg) chewable tablet Chew 1 tablet daily.     calcium-vitamin D3-vitamin K (VIACTIV) 650 mg-12.5 mcg-40 mcg Chew Chew 1 tablet every evening.     clotrimazole-betamethasone (LOTRISONE) cream Apply 1 application topically 2 (two) times a day as needed (feet).     hydrocortisone 1 % cream Apply topically 3 (three) times a day as needed. Apply to skin rash lesions as needed     HYDROmorphone (DILAUDID) 2 MG tablet Take 0.5-1 tablets (1-2 mg total) by mouth every 6 (six) hours as needed for pain.     levothyroxine (SYNTHROID, LEVOTHROID) 112 MCG tablet Take 112 mcg by mouth Daily at 6:00 am.      lidocaine 4 % patch Place 1 patch on the skin daily. Remove and discard patch with 12 hours or as directed by MD.Apply to left hip     lisinopriL (PRINIVIL,ZESTRIL) 20 MG tablet Take 20 mg by mouth every evening.      melatonin 3 mg Tab tablet Take 1 tablet (3 mg total) by mouth at bedtime as needed.     mometasone (ELOCON) 0.1 % cream Apply 1 application topically daily as needed (skin under eye).     multivitamin with minerals (THERA-M) 9 mg iron-400 mcg Tab tablet Take 1 tablet by mouth daily.     omeprazole (PRILOSEC) 20 MG capsule Take 1 capsule (20 mg total) by mouth 2 (two) times a day before meals.     senna-docusate (SENNOSIDES-DOCUSATE SODIUM) 8.6-50 mg tablet Take 1 tablet by mouth 2 (two) times a day.     ASSESSMENT:      ICD-10-CM    1. Hyperkalemia  E87.5        PLAN:    Hyperkalemia - Kayexalate x 2 ,  monitor labs     Left humerus fracture-nonoperative, follow-up with orthopedics  pain control sling on at all times except for hygiene    Pain management increase scheduled extra strength Tylenol  to q 6 hour scontinue  as needed Dilaudid, Lidocaine patch left hip    Physical deconditioning PT OT    GERD continue Prilosec    Gout on allopurinol    Hypothyroidism continue levothyroxine TSh 1.40 in 9/2020    hypertension on lisinopril    Insomnia continue melatonin    Constipation- Senna S two times a day - resolved           Electronically signed by: Alyce Mora CNP

## 2021-06-17 NOTE — PROGRESS NOTES
[facility]      Facility:    [facility] [[id number]]  Code Status: DNR/DNI      Chief Complaint/Reason for Visit:  Chief Complaint   Patient presents with     H & P     Admit note to TCU-left proximal humerus fracture.        HPI:   Sussy is a 85 y.o. female with hx of HTN, hypothyroidism, admitted to the hospital on 4/29/2021 after a fall at home in which she sustained a left proximal humerus fracture as noted below.     84 y/o F admitted for left humeral fracture from mechanical fall.      Orthopedics was consulted. Her left humeral fracture was deemed non-operative. Pain control utilized with IV dilaudid that was eventually transitioned to oral dilaudid. PT and OT recommended TCU. Orthopedics recommended outpatient follow-up within 7-10 days. Vitals stable and pain tolerable on oral dilaudid regimen.      Overall stabilized and discharged to TCU on 5/3/2021 for PT, OT, nursing cares, medical management and monitoring.     Today:  She is NWB, in a sling. Reports pain is adequately controlled. She will have follow up with ortho on Thurs 5/13/2021. She is treated for HTN with lisinopril, BPs overall acceptable, will be monitored in TCU. She reports some LE swelling, thinks from dietary indiscretion and salt intake, wearing tubigrip type stockings from home, not on diuretics at home. No SOB over baseline, notes she always feels somewhat short of breath but no different than usual. No fever, cough or hypoxia. Appetite is good. No diarrhea or constipation. No urinary sx. She lives alone in a house. No new vision or hearing concerns.       Past Medical History:  Past Medical History:   Diagnosis Date     HTN (hypertension)      Osteoporosis      Thyroid disease            Surgical History:  Past Surgical History:   Procedure Laterality Date     IR MESENTERIC ANGIOGRAM  3/29/2020     MIDLINE INSERTION - DOUBLE LUMEN  3/29/2020          UT ESOPHAGOGASTRODUODENOSCOPY  TRANSORAL DIAGNOSTIC N/A 3/29/2020    Procedure: ESOPHAGOGASTRODUODENOSCOPY (EGD) with Epi injection and cautery;  Surgeon: Pacheco Echeverria DO;  Location: [facility];  Service: Gastroenterology       Family History:   Neg for premature CAD.      Social History:    Social History     Socioeconomic History     Marital status:      Spouse name: Not on file     Number of children: Not on file     Years of education: Not on file     Highest education level: Not on file   Occupational History     Not on file   Social Needs     Financial resource strain: Not on file     Food insecurity     Worry: Not on file     Inability: Not on file     Transportation needs     Medical: Not on file     Non-medical: Not on file   Tobacco Use     Smoking status: Never Smoker   Substance and Sexual Activity     Alcohol use: No     Drug use: No     Sexual activity: Not on file   Lifestyle     Physical activity     Days per week: Not on file     Minutes per session: Not on file     Stress: Not on file   Relationships     Social connections     Talks on phone: Not on file     Gets together: Not on file     Attends Protestant service: Not on file     Active member of club or organization: Not on file     Attends meetings of clubs or organizations: Not on file     Relationship status: Not on file     Intimate partner violence     Fear of current or ex partner: Not on file     Emotionally abused: Not on file     Physically abused: Not on file     Forced sexual activity: Not on file   Other Topics Concern     Not on file   Social History Narrative     Not on file        Medication List:  Current Outpatient Medications   Medication Sig     acetaminophen (TYLENOL) 500 MG tablet Take 2 tablets (1,000 mg total) by mouth 3 (three) times a day. (Patient taking differently: Take 1,000 mg by mouth every 6 (six) hours. )     allopurinoL (ZYLOPRIM) 100 MG tablet Take 100 mg by mouth 2 (two) times a day.     calcium carbonate (OS-CARMELA) 500  "mg calcium (1,250 mg) chewable tablet Chew 1 tablet daily.     calcium-vitamin D3-vitamin K (VIACTIV) 650 mg-12.5 mcg-40 mcg Chew Chew 1 tablet every evening.     clotrimazole-betamethasone (LOTRISONE) cream Apply 1 application topically 2 (two) times a day as needed (feet).     hydrocortisone 1 % cream Apply topically 3 (three) times a day as needed. Apply to skin rash lesions as needed     HYDROmorphone (DILAUDID) 2 MG tablet Take 0.5-1 tablets (1-2 mg total) by mouth every 6 (six) hours as needed for pain.     levothyroxine (SYNTHROID, LEVOTHROID) 112 MCG tablet Take 112 mcg by mouth Daily at 6:00 am.      lidocaine 4 % patch Place 1 patch on the skin daily. Remove and discard patch with 12 hours or as directed by MD.Apply to left hip     lisinopriL (PRINIVIL,ZESTRIL) 20 MG tablet Take 20 mg by mouth every evening.      melatonin 3 mg Tab tablet Take 1 tablet (3 mg total) by mouth at bedtime as needed.     mometasone (ELOCON) 0.1 % cream Apply 1 application topically daily as needed (skin under eye).     multivitamin with minerals (THERA-M) 9 mg iron-400 mcg Tab tablet Take 1 tablet by mouth daily.     omeprazole (PRILOSEC) 20 MG capsule Take 1 capsule (20 mg total) by mouth 2 (two) times a day before meals.     senna-docusate (SENNOSIDES-DOCUSATE SODIUM) 8.6-50 mg tablet Take 1 tablet by mouth 2 (two) times a day.       Allergies:  Allergies   Allergen Reactions     Hydrocodone-Acetaminophen Nausea And Vomiting     Amoxicillin Other (See Comments)     Sores in mouth, tongue slightly swollen     Hydrochlorothiazide Unknown     Levofloxacin Other (See Comments)     \"mouth gets raw\"       Review of Systems:  Pertinent items as noted in HPI.      Physical Exam:  Note: COVID-19 pandemic precautions in place. Physical exam performed with social distancing considerations.  General: Patient is alert female, no distress.   Vitals: /77, Temp 97.6, Pulse 78, RR 18, O2 sat 96%RA.  HEENT: Head is NCAT. Eyes show no " injection or icterus. Nares negative. Oropharynx well hydrated.  Neck: No JVD.  Lungs: Non labored respirations   Abdomen: Soft, no tenderness on exam.  No guarding rebound or rigidity.  : Deferred.  Extremities: Mild LE edema is noted. Left UE in sling.   Musculoskeletal: Age related degen changes.   Skin: No rashes.   Psych: Mood appears good.      Labs:  Lab Results   Component Value Date    WBC 9.2 05/01/2021    HGB 9.0 (L) 05/01/2021    HCT 28.0 (L) 05/01/2021     (H) 05/01/2021     05/01/2021     Results for orders placed or performed during the hospital encounter of 04/29/21   Basic Metabolic Panel   Result Value Ref Range    Sodium 136 136 - 145 mmol/L    Potassium 4.7 3.5 - 5.0 mmol/L    Chloride 103 98 - 107 mmol/L    CO2 27 22 - 31 mmol/L    Anion Gap, Calculation 6 5 - 18 mmol/L    Glucose 107 70 - 125 mg/dL    Calcium 8.9 8.5 - 10.5 mg/dL    BUN 32 (H) 8 - 28 mg/dL    Creatinine 1.19 (H) 0.60 - 1.10 mg/dL    GFR MDRD Af Amer 53 (L) >60 mL/min/1.73m2    GFR MDRD Non Af Amer 43 (L) >60 mL/min/1.73m2         Assessment/Plan:  1. Left humerus fracture. Sustained in a fall at home on 4/29/2021. Seen by ortho, non operative management. NWB in a sling. Sees ortho on Thurs 5/13/2021.  2. HTN. She is on lisinopril. BPs satisfactory, continue to monitor in TCU.  3. Hypothyroidism. Cont home replacement levothyroxine.   4. Anemia. Last Hgb acceptable at 9.0.   5. Hx GIB. DU April 2020. She is on PPI omeprazole.   6. Hx of gout. Cont home allopurinol.  7. Hx breast cancer.   8. Renal insufficiency.  9. Code status is DNR/DNI.          Electronically signed by: Brenda Nava MD

## 2021-06-17 NOTE — PROGRESS NOTES
[facility]    Facility:   [facility] [[id number]]   Code Status: DNR  PCP: Rosalee Bravo CNP   Phone: [phone]   Fax: [phone]      CHIEF COMPLAINT/REASON FOR VISIT:  Chief Complaint   Patient presents with     Discharge Summary       HISTORY COURSE:  *Sussy is a 85 y.o. female undergoing physical and occupational therapy at [facility].  She is with past medical history of dyspnea on exertion, hypothyroidism, hypertension, osteoarthritis of both knees, venous stasis dermatitis of both lower extremities who admitted to the emergency room on 4/29/2021 following a mechanical fall.  She sustained a left humerus fracture which at this time is nonoperative.  Continue pain control and follow-up with orthopedics      Today she is seen to review vital signs  medication review and a face-to-face for discharge.  Patient will discharge to home on 5/25/2021 with current medications and treatments.  She will have home care services PT OT home health aide and RN.  Patient does live alone however her children are involved.  Per therapy she has ambulated 100 feet with a quad cane.  She did not follow-up with orthopedics and she may need to now start dangling exercises. she continues to have a bruise to that left arm.  Her pain is controlled.. Pt was with hyperkalemia at 6.1. She was given kayexalate and potassium now 4.5.   She denied chest pain shortness of breath cough or congestion.  Last hemoglobin was 9.  Follow-up with orthopedics as scheduled    Review of Systems  Constitutional: Positive for activity change. Negative for appetite change, fatigue and fever.        Nonweightbearing left upper extremity due to a nonoperative left humerus fracture   HENT: Negative for congestion.    Respiratory: Negative for cough, shortness of breath and wheezing.    Cardiovascular: Negative for chest pain and leg swelling.   Gastrointestinal:  Negative for  abdominal distention, abdominal pain, diarrhea and nausea.   Genitourinary: Negative for dysuria.   Musculoskeletal: Positive for arthralgias. Negative for back pain.   Skin: Negative for color change and wound.   Neurological: Negative for dizziness.   Psychiatric/Behavioral: Negative for agitation, behavioral problems and confusion.   Vitals:    05/24/21 0927   BP: 135/76   Pulse: 72   Resp: 20   Temp: 97.8  F (36.6  C)   SpO2: 93%   Weight: 211 lb 12.8 oz (96.1 kg)       Physical Exam  Constitutional:       Appearance: She is well-developed.      Comments: Pleasant woman in no acute distress   HENT:      Head: Normocephalic.   Eyes:      Conjunctiva/sclera: Conjunctivae normal.   Neck:      Musculoskeletal: Normal range of motion.   Cardiovascular:      Rate and Rhythm: Normal rate and regular rhythm.      Heart sounds: Normal heart sounds. No murmur.   Pulmonary:      Effort: No respiratory distress.      Breath sounds: Normal breath sounds. No wheezing or rales.   Abdominal:      General: Bowel sounds are normal. There is no distension.      Palpations: Abdomen is soft.      Tenderness: There is no abdominal tenderness.   Musculoskeletal: Normal range of motion.      Comments: Left humerus fracture   Skin:     General: Skin is warm.      Findings: Bruising present.      Comments: Left upper extremity  Neurological:      Mental Status: She is alert and oriented to person, place, and time.   Psychiatric:         Behavior: Behavior normal.   MEDICATION LIST:  Current Outpatient Medications   Medication Sig     acetaminophen (TYLENOL) 500 MG tablet Take 2 tablets (1,000 mg total) by mouth 3 (three) times a day. (Patient taking differently: Take 1,000 mg by mouth every 6 (six) hours. )     allopurinoL (ZYLOPRIM) 100 MG tablet Take 100 mg by mouth 2 (two) times a day.     calcium-vitamin D3-vitamin K (VIACTIV) 650 mg-12.5 mcg-40 mcg Chew Chew 1 tablet every evening.     clotrimazole-betamethasone (LOTRISONE) cream  Apply 1 application topically 2 (two) times a day as needed (feet).     hydrocortisone 1 % cream Apply topically 3 (three) times a day as needed. Apply to skin rash lesions as needed     HYDROmorphone (DILAUDID) 2 MG tablet Take 0.5-1 tablets (1-2 mg total) by mouth every 6 (six) hours as needed for pain.     levothyroxine (SYNTHROID, LEVOTHROID) 112 MCG tablet Take 112 mcg by mouth Daily at 6:00 am.      lisinopriL (PRINIVIL,ZESTRIL) 20 MG tablet Take 20 mg by mouth every evening.      melatonin 3 mg Tab tablet Take 1 tablet (3 mg total) by mouth at bedtime as needed.     mometasone (ELOCON) 0.1 % cream Apply 1 application topically daily as needed (skin under eye).     multivitamin with minerals (THERA-M) 9 mg iron-400 mcg Tab tablet Take 1 tablet by mouth daily.     omeprazole (PRILOSEC) 20 MG capsule Take 1 capsule (20 mg total) by mouth 2 (two) times a day before meals.     senna-docusate (SENNOSIDES-DOCUSATE SODIUM) 8.6-50 mg tablet Take 1 tablet by mouth 2 (two) times a day.       DISCHARGE DIAGNOSIS:    ICD-10-CM    1. Displaced fracture of greater tuberosity of left humerus, initial encounter for closed fracture  S42.252A    2. Physical deconditioning  R53.81    3. Pain management  R52      Hyperkalemia - resolved    Left humerus fracture-nonoperative, follow-up with orthopedics  pain control      Pain management increase scheduled extra strength Tylenol  to q 6 hour scontinue  as needed Dilaudid, Lidocaine patch left hip     Physical deconditioning PT OT     GERD continue Prilosec     Gout on allopurinol     Hypothyroidism continue levothyroxine TSh 1.40 in 9/2020     hypertension on lisinopril     Insomnia continue melatonin     Constipation- Senna S two times a day - resolved     MEDICAL EQUIPMENT NEEDS:  None patient using a quad cane    DISCHARGE PLAN/FACE TO FACE:  I certify that services are/were furnished while this patient was under the care of a physician and that a physician or an allowed  non-physician practitioner (NPP), had a face-to-face encounter that meets the physician face-to-face encounter requirements. The encounter was in whole, or in part, related to the primary reason for home health. The patient is confined to his/her home and needs intermittent skilled nursing, physical therapy, speech-language pathology, or the continued need for occupational therapy. A plan of care has been established by a physician and is periodically reviewed by a physician.  Date of Face-to-Face Encounter: 5/24/21    I certify that, based on my findings, the following services are medically necessary home health services: PT/OT/HHA and RN     My clinical findings support the need for the above skilled services because: PT OT for continued strength and endurance, home health aide to assist with activities of daily living RN for vital signs, medication management and monitoring of CMS left upper extremity    This patient is homebound because: She is deconditioned easily fatigued following a nonoperative left upper extremity humerus fracture.  Patient also with activity restrictions and requiring the use of pain medication    The patient is, or has been, under my care and I have initiated the establishment of the plan of care. This patient will be followed by a physician who will periodically review the plan of care.    Schedule follow up visit with primary care provider within 7 days to reestablish care.    Electronically signed by: Alyce Mora CNP

## 2021-06-19 ENCOUNTER — RECORDS - HEALTHEAST (OUTPATIENT)
Dept: LAB | Facility: CLINIC | Age: 86
End: 2021-06-19

## 2021-06-21 NOTE — LETTER
Letter by Brenda Nava MD at      Author: Brenda Nava MD Service: -- Author Type: --    Filed:  Encounter Date: 5/11/2021 Status: (Other)         Patient: Sussy Cole   MR Number: [id number]   YOB: 1935   Date of Visit: 5/11/2021     [facility]      Facility:    [facility] [[id number]]  Code Status: DNR/DNI      Chief Complaint/Reason for Visit:  Chief Complaint   Patient presents with   ? H & P     Admit note to TCU-left proximal humerus fracture.        HPI:   Sussy is a 85 y.o. female with hx of HTN, hypothyroidism, admitted to the hospital on 4/29/2021 after a fall at home in which she sustained a left proximal humerus fracture as noted below.     84 y/o F admitted for left humeral fracture from mechanical fall.      Orthopedics was consulted. Her left humeral fracture was deemed non-operative. Pain control utilized with IV dilaudid that was eventually transitioned to oral dilaudid. PT and OT recommended TCU. Orthopedics recommended outpatient follow-up within 7-10 days. Vitals stable and pain tolerable on oral dilaudid regimen.      Overall stabilized and discharged to TCU on 5/3/2021 for PT, OT, nursing cares, medical management and monitoring.     Today:  She is NWB, in a sling. Reports pain is adequately controlled. She will have follow up with ortho on Thurs 5/13/2021. She is treated for HTN with lisinopril, BPs overall acceptable, will be monitored in TCU. She reports some LE swelling, thinks from dietary indiscretion and salt intake, wearing tubigrip type stockings from home, not on diuretics at home. No SOB over baseline, notes she always feels somewhat short of breath but no different than usual. No fever, cough or hypoxia. Appetite is good. No diarrhea or constipation. No urinary sx. She lives alone in a house. No new vision or hearing concerns.       Past Medical History:  Past Medical History:   Diagnosis Date   ? HTN  (hypertension)    ? Osteoporosis    ? Thyroid disease            Surgical History:  Past Surgical History:   Procedure Laterality Date   ? IR MESENTERIC ANGIOGRAM  3/29/2020   ? MIDLINE INSERTION - DOUBLE LUMEN  3/29/2020        ? TX ESOPHAGOGASTRODUODENOSCOPY TRANSORAL DIAGNOSTIC N/A 3/29/2020    Procedure: ESOPHAGOGASTRODUODENOSCOPY (EGD) with Epi injection and cautery;  Surgeon: Pacheco Echeverria DO;  Location: [facility];  Service: Gastroenterology       Family History:   Neg for premature CAD.      Social History:    Social History     Socioeconomic History   ? Marital status:      Spouse name: Not on file   ? Number of children: Not on file   ? Years of education: Not on file   ? Highest education level: Not on file   Occupational History   ? Not on file   Social Needs   ? Financial resource strain: Not on file   ? Food insecurity     Worry: Not on file     Inability: Not on file   ? Transportation needs     Medical: Not on file     Non-medical: Not on file   Tobacco Use   ? Smoking status: Never Smoker   Substance and Sexual Activity   ? Alcohol use: No   ? Drug use: No   ? Sexual activity: Not on file   Lifestyle   ? Physical activity     Days per week: Not on file     Minutes per session: Not on file   ? Stress: Not on file   Relationships   ? Social connections     Talks on phone: Not on file     Gets together: Not on file     Attends Yazdanism service: Not on file     Active member of club or organization: Not on file     Attends meetings of clubs or organizations: Not on file     Relationship status: Not on file   ? Intimate partner violence     Fear of current or ex partner: Not on file     Emotionally abused: Not on file     Physically abused: Not on file     Forced sexual activity: Not on file   Other Topics Concern   ? Not on file   Social History Narrative   ? Not on file        Medication List:  Current Outpatient Medications   Medication Sig   ? acetaminophen (TYLENOL) 500 MG  "tablet Take 2 tablets (1,000 mg total) by mouth 3 (three) times a day. (Patient taking differently: Take 1,000 mg by mouth every 6 (six) hours. )   ? allopurinoL (ZYLOPRIM) 100 MG tablet Take 100 mg by mouth 2 (two) times a day.   ? calcium carbonate (OS-CARMELA) 500 mg calcium (1,250 mg) chewable tablet Chew 1 tablet daily.   ? calcium-vitamin D3-vitamin K (VIACTIV) 650 mg-12.5 mcg-40 mcg Chew Chew 1 tablet every evening.   ? clotrimazole-betamethasone (LOTRISONE) cream Apply 1 application topically 2 (two) times a day as needed (feet).   ? hydrocortisone 1 % cream Apply topically 3 (three) times a day as needed. Apply to skin rash lesions as needed   ? HYDROmorphone (DILAUDID) 2 MG tablet Take 0.5-1 tablets (1-2 mg total) by mouth every 6 (six) hours as needed for pain.   ? levothyroxine (SYNTHROID, LEVOTHROID) 112 MCG tablet Take 112 mcg by mouth Daily at 6:00 am.    ? lidocaine 4 % patch Place 1 patch on the skin daily. Remove and discard patch with 12 hours or as directed by MD.Apply to left hip   ? lisinopriL (PRINIVIL,ZESTRIL) 20 MG tablet Take 20 mg by mouth every evening.    ? melatonin 3 mg Tab tablet Take 1 tablet (3 mg total) by mouth at bedtime as needed.   ? mometasone (ELOCON) 0.1 % cream Apply 1 application topically daily as needed (skin under eye).   ? multivitamin with minerals (THERA-M) 9 mg iron-400 mcg Tab tablet Take 1 tablet by mouth daily.   ? omeprazole (PRILOSEC) 20 MG capsule Take 1 capsule (20 mg total) by mouth 2 (two) times a day before meals.   ? senna-docusate (SENNOSIDES-DOCUSATE SODIUM) 8.6-50 mg tablet Take 1 tablet by mouth 2 (two) times a day.       Allergies:  Allergies   Allergen Reactions   ? Hydrocodone-Acetaminophen Nausea And Vomiting   ? Amoxicillin Other (See Comments)     Sores in mouth, tongue slightly swollen   ? Hydrochlorothiazide Unknown   ? Levofloxacin Other (See Comments)     \"mouth gets raw\"       Review of Systems:  Pertinent items as noted in HPI.      Physical " Exam:  Note: COVID-19 pandemic precautions in place. Physical exam performed with social distancing considerations.  General: Patient is alert female, no distress.   Vitals: /77, Temp 97.6, Pulse 78, RR 18, O2 sat 96%RA.  HEENT: Head is NCAT. Eyes show no injection or icterus. Nares negative. Oropharynx well hydrated.  Neck: No JVD.  Lungs: Non labored respirations   Abdomen: Soft, no tenderness on exam.  No guarding rebound or rigidity.  : Deferred.  Extremities: Mild LE edema is noted. Left UE in sling.   Musculoskeletal: Age related degen changes.   Skin: No rashes.   Psych: Mood appears good.      Labs:  Lab Results   Component Value Date    WBC 9.2 05/01/2021    HGB 9.0 (L) 05/01/2021    HCT 28.0 (L) 05/01/2021     (H) 05/01/2021     05/01/2021     Results for orders placed or performed during the hospital encounter of 04/29/21   Basic Metabolic Panel   Result Value Ref Range    Sodium 136 136 - 145 mmol/L    Potassium 4.7 3.5 - 5.0 mmol/L    Chloride 103 98 - 107 mmol/L    CO2 27 22 - 31 mmol/L    Anion Gap, Calculation 6 5 - 18 mmol/L    Glucose 107 70 - 125 mg/dL    Calcium 8.9 8.5 - 10.5 mg/dL    BUN 32 (H) 8 - 28 mg/dL    Creatinine 1.19 (H) 0.60 - 1.10 mg/dL    GFR MDRD Af Amer 53 (L) >60 mL/min/1.73m2    GFR MDRD Non Af Amer 43 (L) >60 mL/min/1.73m2         Assessment/Plan:  1. Left humerus fracture. Sustained in a fall at home on 4/29/2021. Seen by ortho, non operative management. NWB in a sling. Sees ortho on Thurs 5/13/2021.  2. HTN. She is on lisinopril. BPs satisfactory, continue to monitor in TCU.  3. Hypothyroidism. Cont home replacement levothyroxine.   4. Anemia. Last Hgb acceptable at 9.0.   5. Hx GIB. DU April 2020. She is on PPI omeprazole.   6. Hx of gout. Cont home allopurinol.  7. Hx breast cancer.   8. Renal insufficiency.  9. Code status is DNR/DNI.          Electronically signed by: Brenda Nava MD

## 2021-06-21 NOTE — LETTER
Letter by Alyce Mora CNP at      Author: Alyce Mora CNP Service: -- Author Type: --    Filed:  Encounter Date: 5/5/2021 Status: (Other)         Patient: Sussy Cole   MR Number: [id number]   YOB: 1935   Date of Visit: 5/5/2021     [facility]    Facility:   [facility] [[id number]]   Code Status: FULL CODE      CHIEF COMPLAINT/REASON FOR VISIT:  Chief Complaint   Patient presents with   ? Review Of Multiple Medical Conditions     review VS, labs , pain management F/U left humerus fracture        HISTORY:      HPI: Sussy is a 85 y.o. female undergoing physical and occupational therapy at [facility].  She is with past medical history of dyspnea on exertion, hypothyroidism, hypertension, osteoarthritis of both knees, venous stasis dermatitis of both lower extremities who admitted to the emergency room on 4/29/2021 following a mechanical fall.  She sustained a left humerus fracture which at this time is nonoperative.  Continue pain control and follow-up with orthopedics within 1 week.    Today she is seen to review vital signs, labs, follow-up Left humerus fracture,constipation  pain management and to establish care.  She denied chest pain shortness of breath cough or congestion.  She did report slight constipation and was started on senna S twice daily.  She is having pain 10 out of 10 however her pain is more in her left hip than her left humerus fracture.  Lidocaine patch ordered for left hip.  She was also noted to have a slightly elevated potassium level on 5 3 of 5.1 and this will be rechecked in the a.m.  Last hemoglobin was 9.  She will follow-up with orthopedics in 1 week.    Past Medical History:   Diagnosis Date   ? HTN (hypertension)    ? Osteoporosis    ? Thyroid disease              No family history on file.  Social History     Socioeconomic History   ? Marital status:      Spouse name: Not on file   ?  Number of children: Not on file   ? Years of education: Not on file   ? Highest education level: Not on file   Occupational History   ? Not on file   Social Needs   ? Financial resource strain: Not on file   ? Food insecurity     Worry: Not on file     Inability: Not on file   ? Transportation needs     Medical: Not on file     Non-medical: Not on file   Tobacco Use   ? Smoking status: Never Smoker   Substance and Sexual Activity   ? Alcohol use: No   ? Drug use: No   ? Sexual activity: Not on file   Lifestyle   ? Physical activity     Days per week: Not on file     Minutes per session: Not on file   ? Stress: Not on file   Relationships   ? Social connections     Talks on phone: Not on file     Gets together: Not on file     Attends Jehovah's witness service: Not on file     Active member of club or organization: Not on file     Attends meetings of clubs or organizations: Not on file     Relationship status: Not on file   ? Intimate partner violence     Fear of current or ex partner: Not on file     Emotionally abused: Not on file     Physically abused: Not on file     Forced sexual activity: Not on file   Other Topics Concern   ? Not on file   Social History Narrative   ? Not on file         Review of Systems   Constitutional: Positive for activity change. Negative for appetite change, fatigue and fever.        Nonweightbearing left upper extremity due to a nonoperative left humerus fracture   HENT: Negative for congestion.    Respiratory: Negative for cough, shortness of breath and wheezing.    Cardiovascular: Negative for chest pain and leg swelling.   Gastrointestinal: Positive for constipation. Negative for abdominal distention, abdominal pain, diarrhea and nausea.   Genitourinary: Negative for dysuria.   Musculoskeletal: Positive for arthralgias. Negative for back pain.   Skin: Negative for color change and wound.   Neurological: Negative for dizziness.   Psychiatric/Behavioral: Negative for agitation, behavioral  problems and confusion.       Vitals:    05/05/21 0847   BP: 167/87   Pulse: 84   Resp: 20   Temp: 98  F (36.7  C)   SpO2: 96%   Weight: 215 lb 12.8 oz (97.9 kg)       Physical Exam  Constitutional:       Appearance: She is well-developed.      Comments: Pleasant woman in no acute distress   HENT:      Head: Normocephalic.   Eyes:      Conjunctiva/sclera: Conjunctivae normal.   Neck:      Musculoskeletal: Normal range of motion.   Cardiovascular:      Rate and Rhythm: Normal rate and regular rhythm.      Heart sounds: Normal heart sounds. No murmur.   Pulmonary:      Effort: No respiratory distress.      Breath sounds: Normal breath sounds. No wheezing or rales.   Abdominal:      General: Bowel sounds are normal. There is no distension.      Palpations: Abdomen is soft.      Tenderness: There is no abdominal tenderness.   Musculoskeletal: Normal range of motion.      Comments: Left humerus fracture arm in a sling   Skin:     General: Skin is warm.      Findings: Bruising present.      Comments: Left hip   Neurological:      Mental Status: She is alert and oriented to person, place, and time.   Psychiatric:         Behavior: Behavior normal.           LABS:   Recent Results (from the past 240 hour(s))   Asymptomatic SARS-CoV-2 (COVID-19)-PCR    Specimen: Respiratory   Result Value Ref Range    SARS-CoV-2 PCR Result Negative Negative, Invalid   CK   Result Value Ref Range    CK, Total 34 30 - 190 U/L   Basic Metabolic Panel   Result Value Ref Range    Sodium 141 136 - 145 mmol/L    Potassium 4.3 3.5 - 5.0 mmol/L    Chloride 107 98 - 107 mmol/L    CO2 22 22 - 31 mmol/L    Anion Gap, Calculation 12 5 - 18 mmol/L    Glucose 145 (H) 70 - 125 mg/dL    Calcium 8.7 8.5 - 10.5 mg/dL    BUN 31 (H) 8 - 28 mg/dL    Creatinine 0.99 0.60 - 1.10 mg/dL    GFR MDRD Af Amer >60 >60 mL/min/1.73m2    GFR MDRD Non Af Amer 54 (L) >60 mL/min/1.73m2   ECG 12 lead nursing unit performed   Result Value Ref Range    SYSTOLIC BLOOD PRESSURE       DIASTOLIC BLOOD PRESSURE      VENTRICULAR RATE 103 BPM    ATRIAL RATE 103 BPM    P-R INTERVAL 164 ms    QRS DURATION 88 ms    Q-T INTERVAL 344 ms    QTC CALCULATION (BEZET) 450 ms    P Axis 68 degrees    R AXIS 21 degrees    T AXIS 35 degrees    MUSE DIAGNOSIS       Sinus tachycardia  Otherwise normal ECG  When compared with ECG of 26-MAR-2020 14:37,  No significant change was found  Confirmed by SEE ED PROVIDER NOTE FOR, ECG INTERPRETATION (4000),  Sohail Sauceda ([id number]) on 4/30/2021 7:44:02 AM     HM2 (CBC W/O DIFF)   Result Value Ref Range    WBC 12.1 (H) 4.0 - 11.0 thou/uL    RBC 2.91 (L) 3.80 - 5.40 mill/uL    Hemoglobin 9.8 (L) 12.0 - 16.0 g/dL    Hematocrit 31.3 (L) 35.0 - 47.0 %     (H) 80 - 100 fL    MCH 33.7 27.0 - 34.0 pg    MCHC 31.3 (L) 32.0 - 36.0 g/dL    RDW 15.1 (H) 11.0 - 14.5 %    Platelets 179 140 - 440 thou/uL    MPV 13.1 (H) 8.5 - 12.5 fL   BNP(B-type Natriuretic Peptide)   Result Value Ref Range    BNP 30 0 - 167 pg/mL   Magnesium   Result Value Ref Range    Magnesium 1.5 (L) 1.8 - 2.6 mg/dL   Potassium   Result Value Ref Range    Potassium 4.9 3.5 - 5.0 mmol/L   Basic Metabolic Panel   Result Value Ref Range    Sodium 136 136 - 145 mmol/L    Potassium 4.7 3.5 - 5.0 mmol/L    Chloride 103 98 - 107 mmol/L    CO2 27 22 - 31 mmol/L    Anion Gap, Calculation 6 5 - 18 mmol/L    Glucose 107 70 - 125 mg/dL    Calcium 8.9 8.5 - 10.5 mg/dL    BUN 32 (H) 8 - 28 mg/dL    Creatinine 1.19 (H) 0.60 - 1.10 mg/dL    GFR MDRD Af Amer 53 (L) >60 mL/min/1.73m2    GFR MDRD Non Af Amer 43 (L) >60 mL/min/1.73m2   HM2(CBC w/o Differential)   Result Value Ref Range    WBC 9.2 4.0 - 11.0 thou/uL    RBC 2.61 (L) 3.80 - 5.40 mill/uL    Hemoglobin 9.0 (L) 12.0 - 16.0 g/dL    Hematocrit 28.0 (L) 35.0 - 47.0 %     (H) 80 - 100 fL    MCH 34.5 (H) 27.0 - 34.0 pg    MCHC 32.1 32.0 - 36.0 g/dL    RDW 15.4 (H) 11.0 - 14.5 %    Platelets 167 140 - 440 thou/uL    MPV 13.5 (H) 8.5 - 12.5 fL   Magnesium    Result Value Ref Range    Magnesium 2.8 (H) 1.8 - 2.6 mg/dL   Magnesium   Result Value Ref Range    Magnesium 2.5 1.8 - 2.6 mg/dL   Potassium - Next AM   Result Value Ref Range    Potassium 4.6 3.5 - 5.0 mmol/L   Potassium   Result Value Ref Range    Potassium 5.1 (H) 3.5 - 5.0 mmol/L   Magnesium   Result Value Ref Range    Magnesium 2.5 1.8 - 2.6 mg/dL   Asymptomatic SARS-CoV-2 (COVID-19)-PCR    Specimen: Respiratory   Result Value Ref Range    SARS-CoV-2 PCR Result Negative Negative, Invalid     Current Outpatient Medications   Medication Sig   ? acetaminophen (TYLENOL) 500 MG tablet Take 2 tablets (1,000 mg total) by mouth 3 (three) times a day. (Patient taking differently: Take 1,000 mg by mouth every 6 (six) hours. )   ? allopurinoL (ZYLOPRIM) 100 MG tablet Take 100 mg by mouth 2 (two) times a day.   ? calcium carbonate (OS-CARMELA) 500 mg calcium (1,250 mg) chewable tablet Chew 1 tablet daily.   ? calcium-vitamin D3-vitamin K (VIACTIV) 650 mg-12.5 mcg-40 mcg Chew Chew 1 tablet every evening.   ? clotrimazole-betamethasone (LOTRISONE) cream Apply 1 application topically 2 (two) times a day as needed (feet).   ? hydrocortisone 1 % cream Apply topically 3 (three) times a day as needed. Apply to skin rash lesions as needed   ? HYDROmorphone (DILAUDID) 2 MG tablet Take 0.5-1 tablets (1-2 mg total) by mouth every 6 (six) hours as needed for pain.   ? levothyroxine (SYNTHROID, LEVOTHROID) 112 MCG tablet Take 112 mcg by mouth Daily at 6:00 am.    ? lidocaine 4 % patch Place 1 patch on the skin daily. Remove and discard patch with 12 hours or as directed by MD.Apply to left hip   ? lisinopriL (PRINIVIL,ZESTRIL) 20 MG tablet Take 20 mg by mouth every evening.    ? melatonin 3 mg Tab tablet Take 1 tablet (3 mg total) by mouth at bedtime as needed.   ? mometasone (ELOCON) 0.1 % cream Apply 1 application topically daily as needed (skin under eye).   ? multivitamin with minerals (THERA-M) 9 mg iron-400 mcg Tab tablet Take 1  tablet by mouth daily.   ? omeprazole (PRILOSEC) 20 MG capsule Take 1 capsule (20 mg total) by mouth 2 (two) times a day before meals.   ? senna-docusate (SENNOSIDES-DOCUSATE SODIUM) 8.6-50 mg tablet Take 1 tablet by mouth 2 (two) times a day.     ASSESSMENT:      ICD-10-CM    1. Displaced fracture of greater tuberosity of left humerus, initial encounter for closed fracture  S42.252A    2. Pain management  R52    3. Physical deconditioning  R53.81    4. Constipation, unspecified constipation type  K59.00        PLAN:    Left humerus fracture-nonoperative, follow-up with orthopedics within 1 week, pain control sling on at all times except for hygiene    Pain management increase scheduled extra strength Tylenol  to q 6 hour scontinue  as needed Dilaudid, Lidocaine patch left hip    Physical deconditioning PT OT    GERD continue Prilosec    Gout on allopurinol    Hypothyroidism continue levothyroxine TSh 1.40 in 9/2020    hypertension on lisinopril    Insomnia continue melatonin    Constipation- Senna S two times a day           Electronically signed by: Alyce Mora CNP

## 2021-06-21 NOTE — LETTER
Letter by Alyce Mora CNP at      Author: Alyce Mora CNP Service: -- Author Type: --    Filed:  Encounter Date: 5/7/2021 Status: (Other)         Patient: Sussy Cole   MR Number: [id number]   YOB: 1935   Date of Visit: 5/7/2021     [facility]    Facility:   [facility] [[id number]]   Code Status: FULL CODE      CHIEF COMPLAINT/REASON FOR VISIT:  Chief Complaint   Patient presents with   ? Problem Visit     hyperkalemia       HISTORY:      HPI: Sussy is a 85 y.o. female undergoing physical and occupational therapy at [facility].  She is with past medical history of dyspnea on exertion, hypothyroidism, hypertension, osteoarthritis of both knees, venous stasis dermatitis of both lower extremities who admitted to the emergency room on 4/29/2021 following a mechanical fall.  She sustained a left humerus fracture which at this time is nonoperative.  Continue pain control and follow-up with orthopedics     Today she is seen to review vital signs and hyperkalemia. Pt was with hyperkalemia at 6.1. She was given kayexalate and potassium came down to 5.3. She will receive another dose today and lab to be checked in the AM. Pt did report to writer that she eats 2 bananas a day. She was educated on high potassium foods. She denied chest pain shortness of breath cough or congestion.    Last hemoglobin was 9.  She will follow-up with orthopedics     Past Medical History:   Diagnosis Date   ? HTN (hypertension)    ? Osteoporosis    ? Thyroid disease              No family history on file.  Social History     Socioeconomic History   ? Marital status:      Spouse name: Not on file   ? Number of children: Not on file   ? Years of education: Not on file   ? Highest education level: Not on file   Occupational History   ? Not on file   Social Needs   ? Financial resource strain: Not on file   ? Food insecurity     Worry: Not on file      Inability: Not on file   ? Transportation needs     Medical: Not on file     Non-medical: Not on file   Tobacco Use   ? Smoking status: Never Smoker   Substance and Sexual Activity   ? Alcohol use: No   ? Drug use: No   ? Sexual activity: Not on file   Lifestyle   ? Physical activity     Days per week: Not on file     Minutes per session: Not on file   ? Stress: Not on file   Relationships   ? Social connections     Talks on phone: Not on file     Gets together: Not on file     Attends Oriental orthodox service: Not on file     Active member of club or organization: Not on file     Attends meetings of clubs or organizations: Not on file     Relationship status: Not on file   ? Intimate partner violence     Fear of current or ex partner: Not on file     Emotionally abused: Not on file     Physically abused: Not on file     Forced sexual activity: Not on file   Other Topics Concern   ? Not on file   Social History Narrative   ? Not on file         Review of Systems   Constitutional: Positive for activity change. Negative for appetite change, fatigue and fever.        Nonweightbearing left upper extremity due to a nonoperative left humerus fracture   HENT: Negative for congestion.    Respiratory: Negative for cough, shortness of breath and wheezing.    Cardiovascular: Negative for chest pain and leg swelling.   Gastrointestinal: Positive for constipation. Negative for abdominal distention, abdominal pain, diarrhea and nausea.   Genitourinary: Negative for dysuria.   Musculoskeletal: Positive for arthralgias. Negative for back pain.   Skin: Negative for color change and wound.   Neurological: Negative for dizziness.   Psychiatric/Behavioral: Negative for agitation, behavioral problems and confusion.       Vitals:    05/07/21 1225   BP: 141/90   Pulse: 82   Resp: 20   Temp: 98  F (36.7  C)   SpO2: 93%   Weight: 212 lb 9.6 oz (96.4 kg)       Physical Exam  Constitutional:       Appearance: She is well-developed.      Comments:  Pleasant woman in no acute distress   HENT:      Head: Normocephalic.   Eyes:      Conjunctiva/sclera: Conjunctivae normal.   Neck:      Musculoskeletal: Normal range of motion.   Cardiovascular:      Rate and Rhythm: Normal rate and regular rhythm.      Heart sounds: Normal heart sounds. No murmur.   Pulmonary:      Effort: No respiratory distress.      Breath sounds: Normal breath sounds. No wheezing or rales.   Abdominal:      General: Bowel sounds are normal. There is no distension.      Palpations: Abdomen is soft.      Tenderness: There is no abdominal tenderness.   Musculoskeletal: Normal range of motion.      Comments: Left humerus fracture arm in a sling   Skin:     General: Skin is warm.      Findings: Bruising present.      Comments: Left hip   Neurological:      Mental Status: She is alert and oriented to person, place, and time.   Psychiatric:         Behavior: Behavior normal.           LABS:   Recent Results (from the past 240 hour(s))   Asymptomatic SARS-CoV-2 (COVID-19)-PCR    Specimen: Respiratory   Result Value Ref Range    SARS-CoV-2 PCR Result Negative Negative, Invalid   CK   Result Value Ref Range    CK, Total 34 30 - 190 U/L   Basic Metabolic Panel   Result Value Ref Range    Sodium 141 136 - 145 mmol/L    Potassium 4.3 3.5 - 5.0 mmol/L    Chloride 107 98 - 107 mmol/L    CO2 22 22 - 31 mmol/L    Anion Gap, Calculation 12 5 - 18 mmol/L    Glucose 145 (H) 70 - 125 mg/dL    Calcium 8.7 8.5 - 10.5 mg/dL    BUN 31 (H) 8 - 28 mg/dL    Creatinine 0.99 0.60 - 1.10 mg/dL    GFR MDRD Af Amer >60 >60 mL/min/1.73m2    GFR MDRD Non Af Amer 54 (L) >60 mL/min/1.73m2   ECG 12 lead nursing unit performed   Result Value Ref Range    SYSTOLIC BLOOD PRESSURE      DIASTOLIC BLOOD PRESSURE      VENTRICULAR RATE 103 BPM    ATRIAL RATE 103 BPM    P-R INTERVAL 164 ms    QRS DURATION 88 ms    Q-T INTERVAL 344 ms    QTC CALCULATION (BEZET) 450 ms    P Axis 68 degrees    R AXIS 21 degrees    T AXIS 35 degrees    MUSE  DIAGNOSIS       Sinus tachycardia  Otherwise normal ECG  When compared with ECG of 26-MAR-2020 14:37,  No significant change was found  Confirmed by SEE ED PROVIDER NOTE FOR, ECG INTERPRETATION (4000),  Sohail Sauceda ([id number]) on 4/30/2021 7:44:02 AM     HM2 (CBC W/O DIFF)   Result Value Ref Range    WBC 12.1 (H) 4.0 - 11.0 thou/uL    RBC 2.91 (L) 3.80 - 5.40 mill/uL    Hemoglobin 9.8 (L) 12.0 - 16.0 g/dL    Hematocrit 31.3 (L) 35.0 - 47.0 %     (H) 80 - 100 fL    MCH 33.7 27.0 - 34.0 pg    MCHC 31.3 (L) 32.0 - 36.0 g/dL    RDW 15.1 (H) 11.0 - 14.5 %    Platelets 179 140 - 440 thou/uL    MPV 13.1 (H) 8.5 - 12.5 fL   BNP(B-type Natriuretic Peptide)   Result Value Ref Range    BNP 30 0 - 167 pg/mL   Magnesium   Result Value Ref Range    Magnesium 1.5 (L) 1.8 - 2.6 mg/dL   Potassium   Result Value Ref Range    Potassium 4.9 3.5 - 5.0 mmol/L   Basic Metabolic Panel   Result Value Ref Range    Sodium 136 136 - 145 mmol/L    Potassium 4.7 3.5 - 5.0 mmol/L    Chloride 103 98 - 107 mmol/L    CO2 27 22 - 31 mmol/L    Anion Gap, Calculation 6 5 - 18 mmol/L    Glucose 107 70 - 125 mg/dL    Calcium 8.9 8.5 - 10.5 mg/dL    BUN 32 (H) 8 - 28 mg/dL    Creatinine 1.19 (H) 0.60 - 1.10 mg/dL    GFR MDRD Af Amer 53 (L) >60 mL/min/1.73m2    GFR MDRD Non Af Amer 43 (L) >60 mL/min/1.73m2   HM2(CBC w/o Differential)   Result Value Ref Range    WBC 9.2 4.0 - 11.0 thou/uL    RBC 2.61 (L) 3.80 - 5.40 mill/uL    Hemoglobin 9.0 (L) 12.0 - 16.0 g/dL    Hematocrit 28.0 (L) 35.0 - 47.0 %     (H) 80 - 100 fL    MCH 34.5 (H) 27.0 - 34.0 pg    MCHC 32.1 32.0 - 36.0 g/dL    RDW 15.4 (H) 11.0 - 14.5 %    Platelets 167 140 - 440 thou/uL    MPV 13.5 (H) 8.5 - 12.5 fL   Magnesium   Result Value Ref Range    Magnesium 2.8 (H) 1.8 - 2.6 mg/dL   Magnesium   Result Value Ref Range    Magnesium 2.5 1.8 - 2.6 mg/dL   Potassium - Next AM   Result Value Ref Range    Potassium 4.6 3.5 - 5.0 mmol/L   Potassium   Result Value Ref Range     Potassium 5.1 (H) 3.5 - 5.0 mmol/L   Magnesium   Result Value Ref Range    Magnesium 2.5 1.8 - 2.6 mg/dL   Asymptomatic SARS-CoV-2 (COVID-19)-PCR    Specimen: Respiratory   Result Value Ref Range    SARS-CoV-2 PCR Result Negative Negative, Invalid   Potassium   Result Value Ref Range    Potassium 6.2 (HH) 3.5 - 5.0 mmol/L   Potassium   Result Value Ref Range    Potassium 5.3 (H) 3.5 - 5.0 mmol/L     Current Outpatient Medications   Medication Sig   ? acetaminophen (TYLENOL) 500 MG tablet Take 2 tablets (1,000 mg total) by mouth 3 (three) times a day. (Patient taking differently: Take 1,000 mg by mouth every 6 (six) hours. )   ? allopurinoL (ZYLOPRIM) 100 MG tablet Take 100 mg by mouth 2 (two) times a day.   ? calcium carbonate (OS-CARMELA) 500 mg calcium (1,250 mg) chewable tablet Chew 1 tablet daily.   ? calcium-vitamin D3-vitamin K (VIACTIV) 650 mg-12.5 mcg-40 mcg Chew Chew 1 tablet every evening.   ? clotrimazole-betamethasone (LOTRISONE) cream Apply 1 application topically 2 (two) times a day as needed (feet).   ? hydrocortisone 1 % cream Apply topically 3 (three) times a day as needed. Apply to skin rash lesions as needed   ? HYDROmorphone (DILAUDID) 2 MG tablet Take 0.5-1 tablets (1-2 mg total) by mouth every 6 (six) hours as needed for pain.   ? levothyroxine (SYNTHROID, LEVOTHROID) 112 MCG tablet Take 112 mcg by mouth Daily at 6:00 am.    ? lidocaine 4 % patch Place 1 patch on the skin daily. Remove and discard patch with 12 hours or as directed by MD.Apply to left hip   ? lisinopriL (PRINIVIL,ZESTRIL) 20 MG tablet Take 20 mg by mouth every evening.    ? melatonin 3 mg Tab tablet Take 1 tablet (3 mg total) by mouth at bedtime as needed.   ? mometasone (ELOCON) 0.1 % cream Apply 1 application topically daily as needed (skin under eye).   ? multivitamin with minerals (THERA-M) 9 mg iron-400 mcg Tab tablet Take 1 tablet by mouth daily.   ? omeprazole (PRILOSEC) 20 MG capsule Take 1 capsule (20 mg total) by mouth 2  (two) times a day before meals.   ? senna-docusate (SENNOSIDES-DOCUSATE SODIUM) 8.6-50 mg tablet Take 1 tablet by mouth 2 (two) times a day.     ASSESSMENT:      ICD-10-CM    1. Hyperkalemia  E87.5        PLAN:    Hyperkalemia - Kayexalate x 2 , monitor labs     Left humerus fracture-nonoperative, follow-up with orthopedics  pain control sling on at all times except for hygiene    Pain management increase scheduled extra strength Tylenol  to q 6 hour scontinue  as needed Dilaudid, Lidocaine patch left hip    Physical deconditioning PT OT    GERD continue Prilosec    Gout on allopurinol    Hypothyroidism continue levothyroxine TSh 1.40 in 9/2020    hypertension on lisinopril    Insomnia continue melatonin    Constipation- Senna S two times a day - resolved           Electronically signed by: Alyce Mora CNP

## 2021-06-22 LAB — HGB BLD-MCNC: 9.7 G/DL (ref 12–16)

## 2021-06-25 NOTE — TELEPHONE ENCOUNTER
4 week follow up for fracture with XR and appt with NP    LUIS ALBERTO Haines  [facility]  O: [phone]

## 2021-06-26 NOTE — PROGRESS NOTES
[facility]  Initial MD Visit  06/16/21    Facility:   [facility] [[id number]]     Code Status: FULL CODE    Chief Complaint   Patient presents with     Hospital Visit Follow Up       HPI:   Sussy Cole is a 86 y.o. female who was seen at [facility] on 06/16/21 for an initial visit. Medical history is notable for HTN, hypothyroidism and GI bleed. She has had two recent hospitalizations. The first at [facility] from 4/29/21 to 5/3/21 where she presented after a fall and was found to have a left proximal humerus fracture. Ortho recommended non op management. She was discharged to a TCU and then to home on 5/25/21. More recently she was hospitalized at [facility] from 6/2/21 to 6/7/21 where she presented with progressive low back pain and was found to have an acute vs subacute L2 compression fracture. Neurosurgery consulted and recommended a corset brace when out of bed or bed at >30 degrees and outpatient follow up.   She was admitted to this facility for medical management and rehab.     Today, Ms. Cole is seen in her room sitting in a recliner. She had several questions about her back - how long it will take to heal, is it related to osteoporosis, when does she see the doctor she saw in the hospital again, how can she go home and live when her restrictions are no bending and no turning and those things both have to happen to sit down. She reports having had a couple of DEXA scans and was taking 3 Viactiv at home, but believes she is only getting one at the TCU. No chest pain or dyspnea. Nono abdominal pain. No constipation per se, but feels like if she doesn't have a good salad regularly she gets bound up a bit. No concerns today per discussion with nursing. She is working with therapies.     ALLERGIES:  Allergies   Allergen Reactions     Hydrocodone-Acetaminophen Nausea And Vomiting     Amoxicillin Other (See Comments)     Sores in mouth, tongue slightly swollen  "    Hydrochlorothiazide Unknown     Levofloxacin Other (See Comments)     \"mouth gets raw\"     Morphine Nausea And Vomiting       PAST MEDICAL HISTORY:  Past Medical History:   Diagnosis Date     Breast cancer (H)      Duodenal ulcer 04/2020     HTN (hypertension)      Osteoporosis      Thyroid disease                PAST SURGICAL HISTORY:  Past Surgical History:   Procedure Laterality Date     BACK SURGERY       BREAST LUMPECTOMY       IR MESENTERIC ANGIOGRAM  03/29/2020     MIDLINE INSERTION - DOUBLE LUMEN  03/29/2020          TN ESOPHAGOGASTRODUODENOSCOPY TRANSORAL DIAGNOSTIC N/A 03/29/2020    Procedure: ESOPHAGOGASTRODUODENOSCOPY (EGD) with Epi injection and cautery;  Surgeon: Pacheco Echeverria DO;  Location: [facility];  Service: Gastroenterology       FAMILY HISTORY:  Family History   Problem Relation Age of Onset     Glaucoma Brother      SOCIAL HISTORY:  Lives alone     MEDICATIONS:  Post Discharge Medication Reconciliation Status: discharge medications reconciled and changed, per note/orders    Current Outpatient Medications   Medication Sig     acetaminophen (TYLENOL) 500 MG tablet Take 2 tablets (1,000 mg total) by mouth 3 (three) times a day.     allopurinoL (ZYLOPRIM) 100 MG tablet Take 100 mg by mouth 2 (two) times a day.      calcium-vitamin D3-vitamin K (VIACTIV) 650 mg-12.5 mcg-40 mcg Chew Chew 1 tablet 2 (two) times a day.      diclofenac sodium (VOLTAREN) 1 % Gel Apply 2 g topically 4 (four) times a day as needed. 2g to LUE, 4g to LLE     levothyroxine (SYNTHROID, LEVOTHROID) 112 MCG tablet Take 112 mcg by mouth Daily at 6:00 am.      lidocaine 4 % patch Place 1 patch on the skin daily. Remove and discard patch with 12 hours or as directed by MD. Apply to area of back pain     lisinopriL (PRINIVIL,ZESTRIL) 20 MG tablet Take 20 mg by mouth every evening.      melatonin 3 mg Tab tablet Take 1 tablet (3 mg total) by mouth at bedtime as needed.     menthol-zinc oxide (CALMOSEPTINE) 0.44-20.6 " "% Oint ointment Apply two times a day to perianal area of irritation     multivitamin with minerals (THERA-M) 9 mg iron-400 mcg Tab tablet Take 1 tablet by mouth daily.      omeprazole (PRILOSEC) 20 MG capsule Take 1 capsule (20 mg total) by mouth 2 (two) times a day before meals.     polyethylene glycol (MIRALAX) 17 gram packet Take 1 packet (17 g total) by mouth daily as needed.     senna-docusate (SENNOSIDES-DOCUSATE SODIUM) 8.6-50 mg tablet Take 2 tablets by mouth daily. Hold for 2 or more loose stools per 24hrs     traMADoL (ULTRAM) 50 mg tablet Take 1 tablet (50 mg total) by mouth every 6 (six) hours as needed.        ROS:  10 point ROS neg other than the symptoms noted above in the HPI.    PHYSICAL EXAM:  /54   Pulse (!) 54   Temp 97.6  F (36.4  C)   Resp 18   Ht 5' 3\" (1.6 m)   Wt 197 lb 8 oz (89.6 kg)   SpO2 94%   BMI 34.99 kg/m     Gen: sitting in recliner, alert, cooperative and in no acute distress  HEENT: normocephalic; oropharynx clear  Card: RRR, S1, S2, no murmurs  Resp: lungs clear to auscultation bilaterally, no crackles or wheezes  MSK: decreased muscle tone, no LE edema  Neuro: CX II-XII grossly in tact; ROM in all four extremities grossly in tact  Psych: alert and oriented x3; normal affect  Skin: no LE venous stasis changes; no ecchymoses over L hand/lower arm     LABORATORY/IMAGING DATA:  Reviewed as per Epic    ASSESSMENT/PLAN:    L2 Compression Fracture, Acute vs Subacute  Acute Low Back Pain, Improving  -- corset brace when out of bed   -- APAP 1000 mg three times a day, diclofenac gel four times a day PRN, lidoderm patch on q12h/off q12h and tramadol 50 mg q6h PRN   -- PT/OT  -- follow up with neurosurgery as scheduled    Left Proximal Humerus Fracture (4/29/21)  Non-op management. Pain is OK  -- NWB to LUE  -- sling  -- APAP 1000 mg three times a day  -- Ca+D supplement    HTN  SBPs 120s-150s  -- lisinopril 20 mg daily  -- follow BPs and adjust medications as " needed    Hypothyroidisim  TSH 1.2 in March   -- levothyroxine 112 mcg daily    Gout  By history. No signs of flare.   -- allopurinol 100 mg two times a day    Hx of Duodenal Ulcer   -- omeprazole 20 mg two times a day     CKD, Stage III  Baseline Cr 0.8-1.1. Cr 1.07 on 6/6  -- avoid nephroroxic agents  -- periodic BMP    Insomnia  -- melatonin 3 mg PRN    Slow Transit Constipation  -- Miralax 17g daily PRN and Senna-S 2 tabs daily  -- adjust bowel regimen as needed    Fall  Physical Deconditioning  In setting of hospitalization and underlying medical conditions  -- ongoing PT/OT      Electronically signed by: Annita Sanchez MD

## 2021-06-26 NOTE — PROGRESS NOTES
PRIMARY CARE PROVIDER AND CLINIC:  Rosalee Bravo, CNP, [address] [facility] [address]  Chief Complaint   Patient presents with     Hospital Visit Follow Up     [facility] Medical Record Number:  [id number]  Sussy Cole  is a 86 y.o.  (1935), admitted to the above facility from  [facility]. Hospital stay 6/2/21 through 6/7/21.  Admitted to this facility for rehab, medical management, and nursing care.    HPI:    HPI information obtained from: facility chart records, facility staff, patient report, [facility] chart review, and Care Everywhere [facility] chart review.  .   Brief Summary of Hospital Course:   Patient hospitalized during above noted dates following advancement of back pain and was found to have a lumbar compression fracture following moving furniture.  To further complicate therapies, she is noted to have prior left humerus fracture with normal healing of which she is nonweightbearing.  She also suffered from opioid induced obstipation, bowel regimen.  She does have intermittent tingling in her right fourth and fifth fingers since her initial fall, they suspect traumatic nerve injury with recommendation for EMGs if not improved.  Chronic ailments and hypothyroidism, hypertension and previous history of GI bleed remained stable throughout hospitalization    Updates on Status Since Skilled nursing Admission:   HPI: Sussy is a 86 y.o. female  has a past medical history of Breast cancer (H), Duodenal ulcer (04/2020), HTN (hypertension), Osteoporosis, and Thyroid disease. She also has no past medical history of History of anesthesia complications.  Patient is seen today for an admission visit to TCU.  She is seen sitting upright in recliner in her room with immobilizer in place for left humeral fracture.  She notes mild low back pain, stating she needs to regain control of her pain since transitioning to TCU.  She is looking forward to increasing her safety in order to go home sooner  versus later.  Pain at baseline.  Bowels now moving without concern.  She did sleep okay last night. Denies CP, palpitations, fatigue, nausea, vomiting, increased SOB/VU, fever, chills, and/or b/b concerns today.    Past Medical History:   Diagnosis Date     Breast cancer (H)      Duodenal ulcer 04/2020     HTN (hypertension)      Osteoporosis      Thyroid disease              Family History   Problem Relation Age of Onset     Glaucoma Brother      Social History     Socioeconomic History     Marital status:      Spouse name: Not on file     Number of children: Not on file     Years of education: Not on file     Highest education level: Not on file   Occupational History     Not on file   Social Needs     Financial resource strain: Not on file     Food insecurity     Worry: Not on file     Inability: Not on file     Transportation needs     Medical: Not on file     Non-medical: Not on file   Tobacco Use     Smoking status: Former Smoker   Substance and Sexual Activity     Alcohol use: No     Drug use: No     Sexual activity: Not on file   Lifestyle     Physical activity     Days per week: Not on file     Minutes per session: Not on file     Stress: Not on file   Relationships     Social connections     Talks on phone: Not on file     Gets together: Not on file     Attends Jew service: Not on file     Active member of club or organization: Not on file     Attends meetings of clubs or organizations: Not on file     Relationship status: Not on file     Intimate partner violence     Fear of current or ex partner: Not on file     Emotionally abused: Not on file     Physically abused: Not on file     Forced sexual activity: Not on file   Other Topics Concern     Not on file   Social History Narrative     Not on file       REVIEW OF SYSTEM:  A 12 point comprehensive review of systems was negative except as noted.    PHYSICAL EXAM:   GENERAL APPEARANCE:  Alert, in no distress, oriented, cooperative  EYES:   EOM, conjunctivae, lids, pupils and irises normal  RESP:  respiratory effort and palpation of chest normal, lungs clear to auscultation , no respiratory distress  CV:  Palpation and auscultation of heart done , regular rate and rhythm, no murmur, rub, or gallop, no edema, +2 pedal pulses  ABDOMEN:  normal bowel sounds, soft, nontender, no hepatosplenomegaly or other masses  M/S:   Gait and station abnormal -Unable to assess secondary to patient sitting in recliner  Digits and nails abnormal -Arthritic changes present  Examination of:   left upper extremity, right lower extremity and left lower extremity  Inspection, ROM, stability and muscle strength Diminished secondary to noted injuries  NEURO:   Cranial nerves 2-12 are normal tested and grossly at patient's baseline, Does continue with numbness right fourth and fifth fingers  PSYCH:  oriented X 3, normal insight, judgement and memory, affect and mood normal      LABS:   Reviewed in epic    ASSESSMENT/PLAN:      ICD-10-CM    1. Closed compression fracture of L2 vertebra, initial encounter (H)  S32.020A  acute-unstable.  Continues with ongoing pain currently managed on regimen Tylenol, Voltaren gel and tramadol.  Hemoglobin on 6/15/2021.   2. History of humerus fracture  Z87.81  acute-unstable, improving.  Prior injury, nonsurgical intervention at this time with nonweightbearing noted appropriate healing inpatient.  Continue weightbearing as ordered with follow-up per Ortho's recommendations.   3. Therapeutic opioid induced constipation  K59.03  acute-improving.  On regimen of senna     T40.2X5A and MiraLAX.   4. Ulnar neuropathy of right upper extremity  G56.21  acute-unstable.  Continues to have numbness and tingling of fourth and fifth fingers, she has a scheduled follow-up with Ortho in which she plans on discussing this further with them.   5. Hypothyroidism, unspecified type  E03.9  chronic-stable.  Managed on regimen of Synthroid   6. Essential hypertension   I10  chronic-stable.  Managed on regimen of lisinopril.   7. History of GI bleed  Z87.19  chronic, her past medical history-stable.  Managed on regimen of omeprazole.   8. Fall, subsequent encounter  W19.XXXD  acute-unstable.  Leading to above noted injury of humerus.  Therapies as below.   9. Physical deconditioning  R53.81  acute-unstable.  PT/OT-advanced/treat-advance per their recommendations.  Social work to assist with discharge planning       Electronically signed by:  JUNIE Alas, BHUPENDRA  [facility]  [place] Office: [address] [place] [address]   [place] Cell: [phone]  [place] Fax: [phone]    [facility] Office: [address] [place] [address]  [facility] Phone: [phone]  [facility] Voicemail: [phone]

## 2021-06-30 ENCOUNTER — AMBULATORY - HEALTHEAST (OUTPATIENT)
Dept: GERIATRICS | Facility: CLINIC | Age: 86
End: 2021-06-30

## 2021-07-04 NOTE — LETTER
Letter by Brenda Nava MD at      Author: Brenda Nava MD Service: -- Author Type: --    Filed:  Encounter Date: 5/21/2021 Status: (Other)         Patient: Sussy Cole   MR Number: [id number]   YOB: 1935   Date of Visit: 5/21/2021             [facility]    Facility:   [facility] [[id number]]   Code Status: DNR/DNI       Chief Complaint   Patient presents with   ? Follow Up     TCU 5/21/2021. Left humerus fracture.        TCU HPI:   Sussy is a 85 y.o. female with hx of HTN, hypothyroidism, admitted to the hospital on 4/29/2021 after a fall at home in which she sustained a left proximal humerus fracture as noted below.     86 y/o F admitted for left humeral fracture from mechanical fall.      Orthopedics was consulted. Her left humeral fracture was deemed non-operative. Pain control utilized with IV dilaudid that was eventually transitioned to oral dilaudid. PT and OT recommended TCU. Orthopedics recommended outpatient follow-up within 7-10 days. Vitals stable and pain tolerable on oral dilaudid regimen.      Overall stabilized and discharged to TCU on 5/3/2021 for PT, OT, nursing cares, medical management and monitoring.     Today:  She saw ortho for follow up on 5/13/2021, may start dangling exercises, walk with walker or cane as tolerated and RTC 4 weeks. She continues in therapy as able. Pain in left shoulder and arm is manageable. She has some mild LE edema, has compression from home though not wearing today. She is not on diuretics. It is noted she is eating myers today and she does admit that dietary indiscretion has played a role in LE swelling. She is ambulating with assistive device. She is planning to discharge home early next week. She is on lisinopril for HTN. Denies shortness of breath over baseline. No chest pain or dizziness. No bowel or bladder concerns.       Past Medical History:  Past Medical History:   Diagnosis  Date   ? HTN (hypertension)    ? Osteoporosis    ? Thyroid disease         Medication List:  Current Outpatient Medications   Medication Instructions   ? acetaminophen (TYLENOL) 1,000 mg, Oral, 3 times daily   ? allopurinoL (ZYLOPRIM) 100 mg, Oral, 2 times daily   ? calcium-vitamin D3-vitamin K (VIACTIV) 650 mg-12.5 mcg-40 mcg Chew 1 tablet, Oral, Every evening   ? clotrimazole-betamethasone (LOTRISONE) cream 1 application, Topical, 2 times daily PRN   ? hydrocortisone 1 % cream Topical, 3 times daily PRN, Apply to skin rash lesions as needed   ? HYDROmorphone (DILAUDID) 1-2 mg, Oral, Every 6 hours PRN   ? levothyroxine (SYNTHROID, LEVOTHROID) 112 mcg, Oral, QDaily   ? lisinopriL (PRINIVIL,ZESTRIL) 20 mg, Oral, Every evening   ? melatonin 3 mg, Oral, Bedtime PRN   ? mometasone (ELOCON) 0.1 % cream 1 application, Topical, Daily PRN   ? multivitamin with minerals (THERA-M) 9 mg iron-400 mcg Tab tablet 1 tablet, Oral, DAILY   ? omeprazole (PRILOSEC) 20 mg, Oral, 2 times daily before meals   ? senna-docusate (SENNOSIDES-DOCUSATE SODIUM) 8.6-50 mg tablet 1 tablet, Oral, 2 times daily       Physical Exam:  Note: COVID-19 pandemic precautions in place. Physical exam performed with social distancing considerations.  General: Patient is alert female, no distress.   Vitals: /79, Temp 98.2, Pulse 88, RR 18, O2 sat 96 %RA.  HEENT: Head is NCAT. Eyes show no injection or icterus. Nares negative. Oropharynx well hydrated.  Neck: No JVD.  Lungs: Non labored respirations.  : Deferred.  Extremities: Mild LE edema is noted. Left UE in sling.   Musculoskeletal: Age related degen changes.    Psych: Mood appears good.      Labs:  Lab Results   Component Value Date    WBC 9.2 05/01/2021    HGB 9.0 (L) 05/01/2021    HCT 28.0 (L) 05/01/2021     (H) 05/01/2021     05/01/2021     Results for orders placed or performed during the hospital encounter of 04/29/21   Basic Metabolic Panel   Result Value Ref Range    Sodium  136 136 - 145 mmol/L    Potassium 4.7 3.5 - 5.0 mmol/L    Chloride 103 98 - 107 mmol/L    CO2 27 22 - 31 mmol/L    Anion Gap, Calculation 6 5 - 18 mmol/L    Glucose 107 70 - 125 mg/dL    Calcium 8.9 8.5 - 10.5 mg/dL    BUN 32 (H) 8 - 28 mg/dL    Creatinine 1.19 (H) 0.60 - 1.10 mg/dL    GFR MDRD Af Amer 53 (L) >60 mL/min/1.73m2    GFR MDRD Non Af Amer 43 (L) >60 mL/min/1.73m2         Assessment/Plan:  1. Left humerus fracture. Sustained in a fall at home on 4/29/2021. Non operative management. NWB in a sling. Saw ortho on Thurs 5/13/2021. Okay to begin dangling exercises. RTC 4 weeks. Walk with walker or cane as tolerated.   2. HTN. Cont lisinopril, monitor BPS in TCU.  3. Hypothyroidism. Cont home replacement levothyroxine.   4. Anemia. Last Hgb acceptable at 9.0.   5. Hx GIB. DU April 2020. She is on PPI omeprazole. No abdominal concerns.         Electronically signed by: Brenda Nava MD

## 2021-07-04 NOTE — LETTER
Letter by Alyce Mora CNP at      Author: Alyce Mora CNP Service: -- Author Type: --    Filed:  Encounter Date: 5/24/2021 Status: (Other)         Patient: Sussy Cole   MR Number: [id number]   YOB: 1935   Date of Visit: 5/24/2021     [facility]    Facility:   [facility] [[id number]]   Code Status: DNR  PCP: Rosalee Bravo CNP   Phone: [phone]   Fax: [phone]      CHIEF COMPLAINT/REASON FOR VISIT:  Chief Complaint   Patient presents with   ? Discharge Summary       HISTORY COURSE:  *Susys is a 85 y.o. female undergoing physical and occupational therapy at [facility].  She is with past medical history of dyspnea on exertion, hypothyroidism, hypertension, osteoarthritis of both knees, venous stasis dermatitis of both lower extremities who admitted to the emergency room on 4/29/2021 following a mechanical fall.  She sustained a left humerus fracture which at this time is nonoperative.  Continue pain control and follow-up with orthopedics      Today she is seen to review vital signs  medication review and a face-to-face for discharge.  Patient will discharge to home on 5/25/2021 with current medications and treatments.  She will have home care services PT OT home health aide and RN.  Patient does live alone however her children are involved.  Per therapy she has ambulated 100 feet with a quad cane.  She did not follow-up with orthopedics and she may need to now start dangling exercises. she continues to have a bruise to that left arm.  Her pain is controlled.. Pt was with hyperkalemia at 6.1. She was given kayexalate and potassium now 4.5.   She denied chest pain shortness of breath cough or congestion.  Last hemoglobin was 9.  Follow-up with orthopedics as scheduled    Review of Systems  Constitutional: Positive for activity change. Negative for appetite change, fatigue and fever.        Nonweightbearing left upper  extremity due to a nonoperative left humerus fracture   HENT: Negative for congestion.    Respiratory: Negative for cough, shortness of breath and wheezing.    Cardiovascular: Negative for chest pain and leg swelling.   Gastrointestinal:  Negative for abdominal distention, abdominal pain, diarrhea and nausea.   Genitourinary: Negative for dysuria.   Musculoskeletal: Positive for arthralgias. Negative for back pain.   Skin: Negative for color change and wound.   Neurological: Negative for dizziness.   Psychiatric/Behavioral: Negative for agitation, behavioral problems and confusion.   Vitals:    05/24/21 0927   BP: 135/76   Pulse: 72   Resp: 20   Temp: 97.8  F (36.6  C)   SpO2: 93%   Weight: 211 lb 12.8 oz (96.1 kg)       Physical Exam  Constitutional:       Appearance: She is well-developed.      Comments: Pleasant woman in no acute distress   HENT:      Head: Normocephalic.   Eyes:      Conjunctiva/sclera: Conjunctivae normal.   Neck:      Musculoskeletal: Normal range of motion.   Cardiovascular:      Rate and Rhythm: Normal rate and regular rhythm.      Heart sounds: Normal heart sounds. No murmur.   Pulmonary:      Effort: No respiratory distress.      Breath sounds: Normal breath sounds. No wheezing or rales.   Abdominal:      General: Bowel sounds are normal. There is no distension.      Palpations: Abdomen is soft.      Tenderness: There is no abdominal tenderness.   Musculoskeletal: Normal range of motion.      Comments: Left humerus fracture   Skin:     General: Skin is warm.      Findings: Bruising present.      Comments: Left upper extremity  Neurological:      Mental Status: She is alert and oriented to person, place, and time.   Psychiatric:         Behavior: Behavior normal.   MEDICATION LIST:  Current Outpatient Medications   Medication Sig   ? acetaminophen (TYLENOL) 500 MG tablet Take 2 tablets (1,000 mg total) by mouth 3 (three) times a day. (Patient taking differently: Take 1,000 mg by mouth every  6 (six) hours. )   ? allopurinoL (ZYLOPRIM) 100 MG tablet Take 100 mg by mouth 2 (two) times a day.   ? calcium-vitamin D3-vitamin K (VIACTIV) 650 mg-12.5 mcg-40 mcg Chew Chew 1 tablet every evening.   ? clotrimazole-betamethasone (LOTRISONE) cream Apply 1 application topically 2 (two) times a day as needed (feet).   ? hydrocortisone 1 % cream Apply topically 3 (three) times a day as needed. Apply to skin rash lesions as needed   ? HYDROmorphone (DILAUDID) 2 MG tablet Take 0.5-1 tablets (1-2 mg total) by mouth every 6 (six) hours as needed for pain.   ? levothyroxine (SYNTHROID, LEVOTHROID) 112 MCG tablet Take 112 mcg by mouth Daily at 6:00 am.    ? lisinopriL (PRINIVIL,ZESTRIL) 20 MG tablet Take 20 mg by mouth every evening.    ? melatonin 3 mg Tab tablet Take 1 tablet (3 mg total) by mouth at bedtime as needed.   ? mometasone (ELOCON) 0.1 % cream Apply 1 application topically daily as needed (skin under eye).   ? multivitamin with minerals (THERA-M) 9 mg iron-400 mcg Tab tablet Take 1 tablet by mouth daily.   ? omeprazole (PRILOSEC) 20 MG capsule Take 1 capsule (20 mg total) by mouth 2 (two) times a day before meals.   ? senna-docusate (SENNOSIDES-DOCUSATE SODIUM) 8.6-50 mg tablet Take 1 tablet by mouth 2 (two) times a day.       DISCHARGE DIAGNOSIS:    ICD-10-CM    1. Displaced fracture of greater tuberosity of left humerus, initial encounter for closed fracture  S42.252A    2. Physical deconditioning  R53.81    3. Pain management  R52      Hyperkalemia - resolved    Left humerus fracture-nonoperative, follow-up with orthopedics  pain control      Pain management increase scheduled extra strength Tylenol  to q 6 hour scontinue  as needed Dilaudid, Lidocaine patch left hip     Physical deconditioning PT OT     GERD continue Prilosec     Gout on allopurinol     Hypothyroidism continue levothyroxine TSh 1.40 in 9/2020     hypertension on lisinopril     Insomnia continue melatonin     Constipation- Senna S two times  a day - resolved     MEDICAL EQUIPMENT NEEDS:  None patient using a quad cane    DISCHARGE PLAN/FACE TO FACE:  I certify that services are/were furnished while this patient was under the care of a physician and that a physician or an allowed non-physician practitioner (NPP), had a face-to-face encounter that meets the physician face-to-face encounter requirements. The encounter was in whole, or in part, related to the primary reason for home health. The patient is confined to his/her home and needs intermittent skilled nursing, physical therapy, speech-language pathology, or the continued need for occupational therapy. A plan of care has been established by a physician and is periodically reviewed by a physician.  Date of Face-to-Face Encounter: 5/24/21    I certify that, based on my findings, the following services are medically necessary home health services: PT/OT/HHA and RN     My clinical findings support the need for the above skilled services because: PT OT for continued strength and endurance, home health aide to assist with activities of daily living RN for vital signs, medication management and monitoring of CMS left upper extremity    This patient is homebound because: She is deconditioned easily fatigued following a nonoperative left upper extremity humerus fracture.  Patient also with activity restrictions and requiring the use of pain medication    The patient is, or has been, under my care and I have initiated the establishment of the plan of care. This patient will be followed by a physician who will periodically review the plan of care.    Schedule follow up visit with primary care provider within 7 days to reestablish care.    Electronically signed by: Alyce Mora CNP

## 2021-07-04 NOTE — LETTER
Letter by Camille Germain NP at      Author: Camille Germain NP Service: -- Author Type: --    Filed:  Encounter Date: 6/8/2021 Status: (Other)         [facility]  [address] [place] [address]                                  Angela 15, 2021    Patient: Sussy Cole   MR Number: [id number]   YOB: 1935   Date of Visit: 6/8/2021     Dear Dr. Mart:    Thank you for referring Sussy Cole to me for evaluation. Below are the relevant portions of my assessment and plan of care.    If you have questions, please do not hesitate to call me. I look forward to following Sussy along with you.    Sincerely,        Camille Germain NP          CC  No Recipients  Camille Germain NP  6/15/2021 10:16 PM  Sign when Signing Visit  PRIMARY CARE PROVIDER AND CLINIC:  Rosalee Bravo, DARELL, [address] [place] [id number]  Chief Complaint   Patient presents with   ? Hospital Visit Follow Up     [facility] Medical Record Number:  [id number]  Sussy Cole  is a 86 y.o.  (1935), admitted to the above facility from  [facility]. Hospital stay 6/2/21 through 6/7/21.  Admitted to this facility for rehab, medical management, and nursing care.    HPI:    HPI information obtained from: facility chart records, facility staff, patient report, [facility] chart review, and Care Everywhere [facility] chart review.  .   Brief Summary of Hospital Course:   Patient hospitalized during above noted dates following advancement of back pain and was found to have a lumbar compression fracture following moving furniture.  To further complicate therapies, she is noted to have prior left humerus fracture with normal healing of which she is nonweightbearing.  She also suffered from opioid induced obstipation, bowel regimen.  She does have intermittent tingling in her right fourth and fifth fingers since her initial fall, they suspect traumatic nerve injury with recommendation for  EMGs if not improved.  Chronic ailments and hypothyroidism, hypertension and previous history of GI bleed remained stable throughout hospitalization    Updates on Status Since Skilled nursing Admission:   HPI: Sussy is a 86 y.o. female  has a past medical history of Breast cancer (H), Duodenal ulcer (04/2020), HTN (hypertension), Osteoporosis, and Thyroid disease. She also has no past medical history of History of anesthesia complications.  Patient is seen today for an admission visit to TCU.  She is seen sitting upright in recliner in her room with immobilizer in place for left humeral fracture.  She notes mild low back pain, stating she needs to regain control of her pain since transitioning to TCU.  She is looking forward to increasing her safety in order to go home sooner versus later.  Pain at baseline.  Bowels now moving without concern.  She did sleep okay last night. Denies CP, palpitations, fatigue, nausea, vomiting, increased SOB/VU, fever, chills, and/or b/b concerns today.    Past Medical History:   Diagnosis Date   ? Breast cancer (H)    ? Duodenal ulcer 04/2020   ? HTN (hypertension)    ? Osteoporosis    ? Thyroid disease              Family History   Problem Relation Age of Onset   ? Glaucoma Brother      Social History     Socioeconomic History   ? Marital status:      Spouse name: Not on file   ? Number of children: Not on file   ? Years of education: Not on file   ? Highest education level: Not on file   Occupational History   ? Not on file   Social Needs   ? Financial resource strain: Not on file   ? Food insecurity     Worry: Not on file     Inability: Not on file   ? Transportation needs     Medical: Not on file     Non-medical: Not on file   Tobacco Use   ? Smoking status: Former Smoker   Substance and Sexual Activity   ? Alcohol use: No   ? Drug use: No   ? Sexual activity: Not on file   Lifestyle   ? Physical activity     Days per week: Not on file     Minutes per session: Not on  file   ? Stress: Not on file   Relationships   ? Social connections     Talks on phone: Not on file     Gets together: Not on file     Attends Yazidism service: Not on file     Active member of club or organization: Not on file     Attends meetings of clubs or organizations: Not on file     Relationship status: Not on file   ? Intimate partner violence     Fear of current or ex partner: Not on file     Emotionally abused: Not on file     Physically abused: Not on file     Forced sexual activity: Not on file   Other Topics Concern   ? Not on file   Social History Narrative   ? Not on file       REVIEW OF SYSTEM:  A 12 point comprehensive review of systems was negative except as noted.    PHYSICAL EXAM:   GENERAL APPEARANCE:  Alert, in no distress, oriented, cooperative  EYES:  EOM, conjunctivae, lids, pupils and irises normal  RESP:  respiratory effort and palpation of chest normal, lungs clear to auscultation , no respiratory distress  CV:  Palpation and auscultation of heart done , regular rate and rhythm, no murmur, rub, or gallop, no edema, +2 pedal pulses  ABDOMEN:  normal bowel sounds, soft, nontender, no hepatosplenomegaly or other masses  M/S:   Gait and station abnormal -Unable to assess secondary to patient sitting in recliner  Digits and nails abnormal -Arthritic changes present  Examination of:   left upper extremity, right lower extremity and left lower extremity  Inspection, ROM, stability and muscle strength Diminished secondary to noted injuries  NEURO:   Cranial nerves 2-12 are normal tested and grossly at patient's baseline, Does continue with numbness right fourth and fifth fingers  PSYCH:  oriented X 3, normal insight, judgement and memory, affect and mood normal      LABS:   Reviewed in epic    ASSESSMENT/PLAN:      ICD-10-CM    1. Closed compression fracture of L2 vertebra, initial encounter (H)  S32.020A  acute-unstable.  Continues with ongoing pain currently managed on regimen Tylenol,  Voltaren gel and tramadol.  Hemoglobin on 6/15/2021.   2. History of humerus fracture  Z87.81  acute-unstable, improving.  Prior injury, nonsurgical intervention at this time with nonweightbearing noted appropriate healing inpatient.  Continue weightbearing as ordered with follow-up per Ortho's recommendations.   3. Therapeutic opioid induced constipation  K59.03  acute-improving.  On regimen of senna     T40.2X5A and MiraLAX.   4. Ulnar neuropathy of right upper extremity  G56.21  acute-unstable.  Continues to have numbness and tingling of fourth and fifth fingers, she has a scheduled follow-up with Ortho in which she plans on discussing this further with them.   5. Hypothyroidism, unspecified type  E03.9  chronic-stable.  Managed on regimen of Synthroid   6. Essential hypertension  I10  chronic-stable.  Managed on regimen of lisinopril.   7. History of GI bleed  Z87.19  chronic, her past medical history-stable.  Managed on regimen of omeprazole.   8. Fall, subsequent encounter  W19.XXXD  acute-unstable.  Leading to above noted injury of humerus.  Therapies as below.   9. Physical deconditioning  R53.81  acute-unstable.  PT/OT-advanced/treat-advance per their recommendations.  Social work to assist with discharge planning       Electronically signed by:  JUNIE Alas, BHUPENDRA  [facility]  [facility] Office: [address] [place] [address]   [facility] Cell: [phone]  [facility] Fax: [phone]    [facility] Office: [address] [place] [address]  [facility] Phone: [phone]  [facility] Voicemail: [phone]

## 2021-07-04 NOTE — LETTER
Letter by Brenda Nava MD at      Author: Brenda Nava MD Service: -- Author Type: --    Filed:  Encounter Date: 5/18/2021 Status: (Other)         Patient: Sussy Cole   MR Number: [id number]   YOB: 1935   Date of Visit: 5/18/2021             [facility]    Facility:   [facility] [[id number]]   Code Status: DNR/DNI      Chief Complaint   Patient presents with   ? Follow Up     TCU 5/18/2021. Left humerus fracture.        TCU HPI:   Sussy is a 85 y.o. female with hx of HTN, hypothyroidism, admitted to the hospital on 4/29/2021 after a fall at home in which she sustained a left proximal humerus fracture as noted below.     86 y/o F admitted for left humeral fracture from mechanical fall.      Orthopedics was consulted. Her left humeral fracture was deemed non-operative. Pain control utilized with IV dilaudid that was eventually transitioned to oral dilaudid. PT and OT recommended TCU. Orthopedics recommended outpatient follow-up within 7-10 days. Vitals stable and pain tolerable on oral dilaudid regimen.      Overall stabilized and discharged to TCU on 5/3/2021 for PT, OT, nursing cares, medical management and monitoring.     Today:  She saw ortho for follow up on 5/13/2021, may start dangling exercises, walk with walker or cane as tolerated and RTC 4 weeks. She continues in therapy as able. She is hopeful to go home by her birthday on 5/26/2019 and states that her son is coming to visit from out of state. She is on lisinopril for HTN, BPs commonly 140s systolic acceptable given age and recent fall. Continuing to monitor for any need for medication changes. She has some LE swelling, wears compression, improved form last visit. Denies shortness of breath over baseline. No chest pain or dizziness. No bowel or bladder concerns.       Past Medical History:  Past Medical History:   Diagnosis Date   ? HTN (hypertension)    ? Osteoporosis    ?  Thyroid disease         Medication List:  Current Outpatient Medications   Medication Instructions   ? acetaminophen (TYLENOL) 1,000 mg, Oral, 3 times daily   ? allopurinoL (ZYLOPRIM) 100 mg, Oral, 2 times daily   ? calcium-vitamin D3-vitamin K (VIACTIV) 650 mg-12.5 mcg-40 mcg Chew 1 tablet, Oral, Every evening   ? clotrimazole-betamethasone (LOTRISONE) cream 1 application, Topical, 2 times daily PRN   ? hydrocortisone 1 % cream Topical, 3 times daily PRN, Apply to skin rash lesions as needed   ? HYDROmorphone (DILAUDID) 1-2 mg, Oral, Every 6 hours PRN   ? levothyroxine (SYNTHROID, LEVOTHROID) 112 mcg, Oral, QDaily   ? lisinopriL (PRINIVIL,ZESTRIL) 20 mg, Oral, Every evening   ? melatonin 3 mg, Oral, Bedtime PRN   ? mometasone (ELOCON) 0.1 % cream 1 application, Topical, Daily PRN   ? multivitamin with minerals (THERA-M) 9 mg iron-400 mcg Tab tablet 1 tablet, Oral, DAILY   ? omeprazole (PRILOSEC) 20 mg, Oral, 2 times daily before meals   ? senna-docusate (SENNOSIDES-DOCUSATE SODIUM) 8.6-50 mg tablet 1 tablet, Oral, 2 times daily       Physical Exam:  Note: COVID-19 pandemic precautions in place. Physical exam performed with social distancing considerations.  General: Patient is alert female, no distress.   Vitals: /86, 140/65, Temp 97.8, Pulse 84, RR 20, O2 sat 93 %RA.  HEENT: Head is NCAT. Eyes show no injection or icterus. Nares negative. Oropharynx well hydrated.  Neck: No JVD.  Lungs: Non labored respirations   Abdomen: Soft, no tenderness on exam.  No guarding rebound or rigidity.  : Deferred.  Extremities: Mild LE edema is noted. Left UE in sling.   Musculoskeletal: Age related degen changes.   Skin: No rashes.   Psych: Mood appears good.      Labs:  Lab Results   Component Value Date    WBC 9.2 05/01/2021    HGB 9.0 (L) 05/01/2021    HCT 28.0 (L) 05/01/2021     (H) 05/01/2021     05/01/2021     Results for orders placed or performed during the hospital encounter of 04/29/21   Basic  Metabolic Panel   Result Value Ref Range    Sodium 136 136 - 145 mmol/L    Potassium 4.7 3.5 - 5.0 mmol/L    Chloride 103 98 - 107 mmol/L    CO2 27 22 - 31 mmol/L    Anion Gap, Calculation 6 5 - 18 mmol/L    Glucose 107 70 - 125 mg/dL    Calcium 8.9 8.5 - 10.5 mg/dL    BUN 32 (H) 8 - 28 mg/dL    Creatinine 1.19 (H) 0.60 - 1.10 mg/dL    GFR MDRD Af Amer 53 (L) >60 mL/min/1.73m2    GFR MDRD Non Af Amer 43 (L) >60 mL/min/1.73m2         Assessment/Plan:  1. Left humerus fracture. Sustained in a fall at home on 4/29/2021. Seen by ortho, non operative management. NWB in a sling. Saw ortho on Thurs 5/13/2021. Okay to begin dangling exercises. RTC weeks. Walk with walker or cane as tolerated.   2. HTN. She is on lisinopril. BPs satisfactory, continue to monitor in TCU.  3. Hypothyroidism. Cont home replacement levothyroxine.   4. Anemia. Last Hgb acceptable at 9.0.   5. Hx GIB. DU April 2020. She is on PPI omeprazole. No abdominal concerns.           Electronically signed by: Brenda Nava MD

## 2021-07-05 PROBLEM — G56.21 ULNAR NEUROPATHY OF RIGHT UPPER EXTREMITY: Status: ACTIVE | Noted: 2021-06-15

## 2021-07-05 PROBLEM — N18.30 CHRONIC KIDNEY DISEASE, STAGE 3 (H): Status: ACTIVE | Noted: 2021-06-20

## 2021-07-06 ENCOUNTER — RECORDS - HEALTHEAST (OUTPATIENT)
Dept: GENERAL RADIOLOGY | Facility: CLINIC | Age: 86
End: 2021-07-06

## 2021-07-06 VITALS
TEMPERATURE: 97.6 F | HEIGHT: 63 IN | HEART RATE: 54 BPM | DIASTOLIC BLOOD PRESSURE: 54 MMHG | OXYGEN SATURATION: 94 % | SYSTOLIC BLOOD PRESSURE: 139 MMHG | BODY MASS INDEX: 34.99 KG/M2 | WEIGHT: 197.5 LBS | RESPIRATION RATE: 18 BRPM

## 2021-07-06 VITALS
RESPIRATION RATE: 20 BRPM | DIASTOLIC BLOOD PRESSURE: 76 MMHG | WEIGHT: 211.8 LBS | OXYGEN SATURATION: 93 % | HEART RATE: 72 BPM | BODY MASS INDEX: 38.74 KG/M2 | TEMPERATURE: 97.8 F | SYSTOLIC BLOOD PRESSURE: 135 MMHG

## 2021-07-06 VITALS
DIASTOLIC BLOOD PRESSURE: 76 MMHG | HEART RATE: 74 BPM | TEMPERATURE: 97.6 F | WEIGHT: 210 LBS | HEIGHT: 63 IN | BODY MASS INDEX: 37.21 KG/M2 | OXYGEN SATURATION: 94 % | RESPIRATION RATE: 22 BRPM | SYSTOLIC BLOOD PRESSURE: 152 MMHG

## 2021-07-06 DIAGNOSIS — S32.020A WEDGE COMPRESSION FRACTURE OF SECOND LUMBAR VERTEBRA, INITIAL ENCOUNTER FOR CLOSED FRACTURE (H): ICD-10-CM

## 2021-07-22 NOTE — PROGRESS NOTES
[facility]    Facility:   [facility] [[id number]]   Code Status: DNR/DNI      Chief Complaint   Patient presents with     Follow Up     TCU 5/18/2021. Left humerus fracture.        TCU HPI:   Sussy is a 85 y.o. female with hx of HTN, hypothyroidism, admitted to the hospital on 4/29/2021 after a fall at home in which she sustained a left proximal humerus fracture as noted below.     86 y/o F admitted for left humeral fracture from mechanical fall.      Orthopedics was consulted. Her left humeral fracture was deemed non-operative. Pain control utilized with IV dilaudid that was eventually transitioned to oral dilaudid. PT and OT recommended TCU. Orthopedics recommended outpatient follow-up within 7-10 days. Vitals stable and pain tolerable on oral dilaudid regimen.      Overall stabilized and discharged to TCU on 5/3/2021 for PT, OT, nursing cares, medical management and monitoring.     Today:  She saw ortho for follow up on 5/13/2021, may start dangling exercises, walk with walker or cane as tolerated and RTC 4 weeks. She continues in therapy as able. She is hopeful to go home by her birthday on 5/26/2019 and states that her son is coming to visit from out of state. She is on lisinopril for HTN, BPs commonly 140s systolic acceptable given age and recent fall. Continuing to monitor for any need for medication changes. She has some LE swelling, wears compression, improved form last visit. Denies shortness of breath over baseline. No chest pain or dizziness. No bowel or bladder concerns.       Past Medical History:  Past Medical History:   Diagnosis Date     HTN (hypertension)      Osteoporosis      Thyroid disease         Medication List:  Current Outpatient Medications   Medication Instructions     acetaminophen (TYLENOL) 1,000 mg, Oral, 3 times daily     allopurinoL (ZYLOPRIM) 100 mg, Oral, 2 times daily     calcium-vitamin D3-vitamin K (VIACTIV) 650 mg-12.5  mcg-40 mcg Chew 1 tablet, Oral, Every evening     clotrimazole-betamethasone (LOTRISONE) cream 1 application, Topical, 2 times daily PRN     hydrocortisone 1 % cream Topical, 3 times daily PRN, Apply to skin rash lesions as needed     HYDROmorphone (DILAUDID) 1-2 mg, Oral, Every 6 hours PRN     levothyroxine (SYNTHROID, LEVOTHROID) 112 mcg, Oral, QDaily     lisinopriL (PRINIVIL,ZESTRIL) 20 mg, Oral, Every evening     melatonin 3 mg, Oral, Bedtime PRN     mometasone (ELOCON) 0.1 % cream 1 application, Topical, Daily PRN     multivitamin with minerals (THERA-M) 9 mg iron-400 mcg Tab tablet 1 tablet, Oral, DAILY     omeprazole (PRILOSEC) 20 mg, Oral, 2 times daily before meals     senna-docusate (SENNOSIDES-DOCUSATE SODIUM) 8.6-50 mg tablet 1 tablet, Oral, 2 times daily       Physical Exam:  Note: COVID-19 pandemic precautions in place. Physical exam performed with social distancing considerations.  General: Patient is alert female, no distress.   Vitals: /86, 140/65, Temp 97.8, Pulse 84, RR 20, O2 sat 93 %RA.  HEENT: Head is NCAT. Eyes show no injection or icterus. Nares negative. Oropharynx well hydrated.  Neck: No JVD.  Lungs: Non labored respirations   Abdomen: Soft, no tenderness on exam.  No guarding rebound or rigidity.  : Deferred.  Extremities: Mild LE edema is noted. Left UE in sling.   Musculoskeletal: Age related degen changes.   Skin: No rashes.   Psych: Mood appears good.      Labs:  Lab Results   Component Value Date    WBC 9.2 05/01/2021    HGB 9.0 (L) 05/01/2021    HCT 28.0 (L) 05/01/2021     (H) 05/01/2021     05/01/2021     Results for orders placed or performed during the hospital encounter of 04/29/21   Basic Metabolic Panel   Result Value Ref Range    Sodium 136 136 - 145 mmol/L    Potassium 4.7 3.5 - 5.0 mmol/L    Chloride 103 98 - 107 mmol/L    CO2 27 22 - 31 mmol/L    Anion Gap, Calculation 6 5 - 18 mmol/L    Glucose 107 70 - 125 mg/dL    Calcium 8.9 8.5 - 10.5 mg/dL     BUN 32 (H) 8 - 28 mg/dL    Creatinine 1.19 (H) 0.60 - 1.10 mg/dL    GFR MDRD Af Amer 53 (L) >60 mL/min/1.73m2    GFR MDRD Non Af Amer 43 (L) >60 mL/min/1.73m2         Assessment/Plan:  1. Left humerus fracture. Sustained in a fall at home on 4/29/2021. Seen by ortho, non operative management. NWB in a sling. Saw ortho on Thurs 5/13/2021. Okay to begin dangling exercises. RTC weeks. Walk with walker or cane as tolerated.   2. HTN. She is on lisinopril. BPs satisfactory, continue to monitor in TCU.  3. Hypothyroidism. Cont home replacement levothyroxine.   4. Anemia. Last Hgb acceptable at 9.0.   5. Hx GIB. DU April 2020. She is on PPI omeprazole. No abdominal concerns.           Electronically signed by: Brenda Nava MD

## 2021-07-22 NOTE — PROGRESS NOTES
[facility]    Facility:   [facility] [[id number]]   Code Status: DNR/DNI       Chief Complaint   Patient presents with     Follow Up     TCU 5/21/2021. Left humerus fracture.        TCU HPI:   Sussy is a 85 y.o. female with hx of HTN, hypothyroidism, admitted to the hospital on 4/29/2021 after a fall at home in which she sustained a left proximal humerus fracture as noted below.     84 y/o F admitted for left humeral fracture from mechanical fall.      Orthopedics was consulted. Her left humeral fracture was deemed non-operative. Pain control utilized with IV dilaudid that was eventually transitioned to oral dilaudid. PT and OT recommended TCU. Orthopedics recommended outpatient follow-up within 7-10 days. Vitals stable and pain tolerable on oral dilaudid regimen.      Overall stabilized and discharged to TCU on 5/3/2021 for PT, OT, nursing cares, medical management and monitoring.     Today:  She saw ortho for follow up on 5/13/2021, may start dangling exercises, walk with walker or cane as tolerated and RTC 4 weeks. She continues in therapy as able. Pain in left shoulder and arm is manageable. She has some mild LE edema, has compression from home though not wearing today. She is not on diuretics. It is noted she is eating myers today and she does admit that dietary indiscretion has played a role in LE swelling. She is ambulating with assistive device. She is planning to discharge home early next week. She is on lisinopril for HTN. Denies shortness of breath over baseline. No chest pain or dizziness. No bowel or bladder concerns.       Past Medical History:  Past Medical History:   Diagnosis Date     HTN (hypertension)      Osteoporosis      Thyroid disease         Medication List:  Current Outpatient Medications   Medication Instructions     acetaminophen (TYLENOL) 1,000 mg, Oral, 3 times daily     allopurinoL (ZYLOPRIM) 100 mg, Oral, 2 times daily      calcium-vitamin D3-vitamin K (VIACTIV) 650 mg-12.5 mcg-40 mcg Chew 1 tablet, Oral, Every evening     clotrimazole-betamethasone (LOTRISONE) cream 1 application, Topical, 2 times daily PRN     hydrocortisone 1 % cream Topical, 3 times daily PRN, Apply to skin rash lesions as needed     HYDROmorphone (DILAUDID) 1-2 mg, Oral, Every 6 hours PRN     levothyroxine (SYNTHROID, LEVOTHROID) 112 mcg, Oral, QDaily     lisinopriL (PRINIVIL,ZESTRIL) 20 mg, Oral, Every evening     melatonin 3 mg, Oral, Bedtime PRN     mometasone (ELOCON) 0.1 % cream 1 application, Topical, Daily PRN     multivitamin with minerals (THERA-M) 9 mg iron-400 mcg Tab tablet 1 tablet, Oral, DAILY     omeprazole (PRILOSEC) 20 mg, Oral, 2 times daily before meals     senna-docusate (SENNOSIDES-DOCUSATE SODIUM) 8.6-50 mg tablet 1 tablet, Oral, 2 times daily       Physical Exam:  Note: COVID-19 pandemic precautions in place. Physical exam performed with social distancing considerations.  General: Patient is alert female, no distress.   Vitals: /79, Temp 98.2, Pulse 88, RR 18, O2 sat 96 %RA.  HEENT: Head is NCAT. Eyes show no injection or icterus. Nares negative. Oropharynx well hydrated.  Neck: No JVD.  Lungs: Non labored respirations.  : Deferred.  Extremities: Mild LE edema is noted. Left UE in sling.   Musculoskeletal: Age related degen changes.    Psych: Mood appears good.      Labs:  Lab Results   Component Value Date    WBC 9.2 05/01/2021    HGB 9.0 (L) 05/01/2021    HCT 28.0 (L) 05/01/2021     (H) 05/01/2021     05/01/2021     Results for orders placed or performed during the hospital encounter of 04/29/21   Basic Metabolic Panel   Result Value Ref Range    Sodium 136 136 - 145 mmol/L    Potassium 4.7 3.5 - 5.0 mmol/L    Chloride 103 98 - 107 mmol/L    CO2 27 22 - 31 mmol/L    Anion Gap, Calculation 6 5 - 18 mmol/L    Glucose 107 70 - 125 mg/dL    Calcium 8.9 8.5 - 10.5 mg/dL    BUN 32 (H) 8 - 28 mg/dL    Creatinine 1.19 (H)  0.60 - 1.10 mg/dL    GFR MDRD Af Amer 53 (L) >60 mL/min/1.73m2    GFR MDRD Non Af Amer 43 (L) >60 mL/min/1.73m2         Assessment/Plan:  1. Left humerus fracture. Sustained in a fall at home on 4/29/2021. Non operative management. NWB in a sling. Saw ortho on Thurs 5/13/2021. Okay to begin dangling exercises. RTC 4 weeks. Walk with walker or cane as tolerated.   2. HTN. Cont lisinopril, monitor BPS in TCU.  3. Hypothyroidism. Cont home replacement levothyroxine.   4. Anemia. Last Hgb acceptable at 9.0.   5. Hx GIB. DU April 2020. She is on PPI omeprazole. No abdominal concerns.         Electronically signed by: Brenda Nava MD

## 2021-11-16 LAB
CHOLESTEROL (EXTERNAL): 166 MG/DL (ref 100–199)
CREATININE (EXTERNAL): 0.97 MG/DL (ref 0.57–1.11)
GFR ESTIMATED (EXTERNAL): 55 ML/MIN/1.73M2
GFR ESTIMATED (IF AFRICAN AMERICAN) (EXTERNAL): >60 ML/MIN/1.73M2
GLUCOSE (EXTERNAL): 98 MG/DL (ref 65–100)
HBA1C MFR BLD: 5.1 %
HDLC SERPL-MCNC: 51 MG/DL
LDL CHOLESTEROL CALCULATED (EXTERNAL): 94 MG/DL
NON HDL CHOLESTEROL (EXTERNAL): 115 MG/DL
POTASSIUM (EXTERNAL): 5 MMOL/L (ref 3.5–5)
TRIGLYCERIDES (EXTERNAL): 103 MG/DL
TSH SERPL-ACNC: 0.69 ULU/ML (ref 0.35–4.94)

## 2021-12-23 PROCEDURE — 99285 EMERGENCY DEPT VISIT HI MDM: CPT | Mod: 25

## 2021-12-24 ENCOUNTER — HOSPITAL ENCOUNTER (EMERGENCY)
Facility: HOSPITAL | Age: 86
Discharge: HOME | End: 2021-12-24
Attending: EMERGENCY MEDICINE | Admitting: EMERGENCY MEDICINE
Payer: MEDICARE

## 2021-12-24 ENCOUNTER — TELEPHONE (OUTPATIENT)
Dept: NEUROLOGY | Facility: CLINIC | Age: 86
End: 2021-12-24

## 2021-12-24 ENCOUNTER — APPOINTMENT (OUTPATIENT)
Dept: CT IMAGING | Facility: HOSPITAL | Age: 86
End: 2021-12-24
Attending: EMERGENCY MEDICINE
Payer: MEDICARE

## 2021-12-24 VITALS
TEMPERATURE: 98.1 F | OXYGEN SATURATION: 95 % | SYSTOLIC BLOOD PRESSURE: 146 MMHG | HEART RATE: 84 BPM | BODY MASS INDEX: 34.72 KG/M2 | WEIGHT: 196 LBS | RESPIRATION RATE: 24 BRPM | DIASTOLIC BLOOD PRESSURE: 65 MMHG

## 2021-12-24 DIAGNOSIS — G45.9 TIA (TRANSIENT ISCHEMIC ATTACK): ICD-10-CM

## 2021-12-24 LAB
ANION GAP SERPL CALCULATED.3IONS-SCNC: 11 MMOL/L (ref 5–18)
ATRIAL RATE - MUSE: 90 BPM
BUN SERPL-MCNC: 41 MG/DL (ref 8–28)
C REACTIVE PROTEIN LHE: <0.1 MG/DL (ref 0–0.8)
CALCIUM SERPL-MCNC: 9.5 MG/DL (ref 8.5–10.5)
CHLORIDE BLD-SCNC: 108 MMOL/L (ref 98–107)
CO2 SERPL-SCNC: 24 MMOL/L (ref 22–31)
CREAT SERPL-MCNC: 1.68 MG/DL (ref 0.6–1.1)
DIASTOLIC BLOOD PRESSURE - MUSE: 58 MMHG
ERYTHROCYTE [DISTWIDTH] IN BLOOD BY AUTOMATED COUNT: 15 % (ref 10–15)
GFR SERPL CREATININE-BSD FRML MDRD: 30 ML/MIN/1.73M2
GLUCOSE BLD-MCNC: 101 MG/DL (ref 70–125)
HCT VFR BLD AUTO: 31.6 % (ref 35–47)
HGB BLD-MCNC: 10.2 G/DL (ref 11.7–15.7)
INR PPP: 0.95 (ref 0.9–1.15)
INTERPRETATION ECG - MUSE: NORMAL
MCH RBC QN AUTO: 35.7 PG (ref 26.5–33)
MCHC RBC AUTO-ENTMCNC: 32.3 G/DL (ref 31.5–36.5)
MCV RBC AUTO: 111 FL (ref 78–100)
P AXIS - MUSE: 71 DEGREES
PLATELET # BLD AUTO: 146 10E3/UL (ref 150–450)
POTASSIUM BLD-SCNC: 4.7 MMOL/L (ref 3.5–5)
PR INTERVAL - MUSE: 160 MS
QRS DURATION - MUSE: 88 MS
QT - MUSE: 356 MS
QTC - MUSE: 435 MS
R AXIS - MUSE: 36 DEGREES
RBC # BLD AUTO: 2.86 10E6/UL (ref 3.8–5.2)
SODIUM SERPL-SCNC: 143 MMOL/L (ref 136–145)
SYSTOLIC BLOOD PRESSURE - MUSE: 143 MMHG
T AXIS - MUSE: 51 DEGREES
VENTRICULAR RATE- MUSE: 90 BPM
WBC # BLD AUTO: 5.5 10E3/UL (ref 4–11)

## 2021-12-24 PROCEDURE — 36415 COLL VENOUS BLD VENIPUNCTURE: CPT | Performed by: EMERGENCY MEDICINE

## 2021-12-24 PROCEDURE — 250N000013 HC RX MED GY IP 250 OP 250 PS 637: Performed by: EMERGENCY MEDICINE

## 2021-12-24 PROCEDURE — 85610 PROTHROMBIN TIME: CPT | Performed by: EMERGENCY MEDICINE

## 2021-12-24 PROCEDURE — 86141 C-REACTIVE PROTEIN HS: CPT | Performed by: EMERGENCY MEDICINE

## 2021-12-24 PROCEDURE — 85027 COMPLETE CBC AUTOMATED: CPT | Performed by: EMERGENCY MEDICINE

## 2021-12-24 PROCEDURE — 250N000011 HC RX IP 250 OP 636: Performed by: EMERGENCY MEDICINE

## 2021-12-24 PROCEDURE — 93005 ELECTROCARDIOGRAM TRACING: CPT | Performed by: EMERGENCY MEDICINE

## 2021-12-24 PROCEDURE — 80048 BASIC METABOLIC PNL TOTAL CA: CPT | Performed by: EMERGENCY MEDICINE

## 2021-12-24 PROCEDURE — 70498 CT ANGIOGRAPHY NECK: CPT

## 2021-12-24 RX ORDER — IOPAMIDOL 755 MG/ML
75 INJECTION, SOLUTION INTRAVASCULAR ONCE
Status: COMPLETED | OUTPATIENT
Start: 2021-12-24 | End: 2021-12-24

## 2021-12-24 RX ORDER — ASPIRIN 81 MG/1
81 TABLET, CHEWABLE ORAL DAILY
Qty: 90 TABLET | Refills: 0 | Status: SHIPPED | OUTPATIENT
Start: 2021-12-24 | End: 2021-12-29

## 2021-12-24 RX ORDER — ASPIRIN 81 MG/1
81 TABLET, CHEWABLE ORAL ONCE
Status: COMPLETED | OUTPATIENT
Start: 2021-12-24 | End: 2021-12-24

## 2021-12-24 RX ORDER — CLOPIDOGREL BISULFATE 75 MG/1
75 TABLET ORAL DAILY
Qty: 30 TABLET | Refills: 0 | Status: SHIPPED | OUTPATIENT
Start: 2021-12-24 | End: 2021-12-29

## 2021-12-24 RX ORDER — CLOPIDOGREL BISULFATE 75 MG/1
150 TABLET ORAL ONCE
Status: COMPLETED | OUTPATIENT
Start: 2021-12-24 | End: 2021-12-24

## 2021-12-24 RX ADMIN — CLOPIDOGREL BISULFATE 150 MG: 75 TABLET ORAL at 02:52

## 2021-12-24 RX ADMIN — IOPAMIDOL 75 ML: 755 INJECTION, SOLUTION INTRAVENOUS at 01:43

## 2021-12-24 RX ADMIN — ASPIRIN 81 MG CHEWABLE TABLET 81 MG: 81 TABLET CHEWABLE at 02:52

## 2021-12-24 NOTE — TELEPHONE ENCOUNTER
Spoke to pt and she would like to be seen around the 27th of December (per [facility]). I let her know someone would call her next week to get scheduled.  Connie Recio MA on 12/24/2021 at 10:38 AM

## 2021-12-24 NOTE — ED PROVIDER NOTES
"EMERGENCY DEPARTMENT ENCOUNTER      NAME: Sussy Cole  AGE: 86 year old female  YOB: 1935  MRN: [id number]  EVALUATION DATE & TIME: 12/24/2021 12:03 AM    PCP: Rosalee Bravo    ED PROVIDER: Stiven Zafar M.D.      No chief complaint on file.        FINAL IMPRESSION:  TIA  Transient aphasia    ED COURSE & MEDICAL DECISION MAKING:    Pertinent Labs & Imaging studies reviewed. (See chart for details)  86 year old female presents to the Emergency Department for evaluation of difficulty talking.  Patient reports she was watching television when she suddenly had light sensitivity.  She made her way into the next room and seemed to be struggling with her speech.  She reported she was \"talking to myself but the words would not come out\".  She then tried texting her son in the next room but it did not make any sense.  Son discovered her after about 5 minutes.  EMS was called.  Ports symptoms lasted totally about 25 to 30 minutes but completely resolved by the time EMS arrived.  Patient denies prior similar episodes.  She reports feeling well earlier in the day without any symptoms.  On exam she is well-nourished well-developed elderly female in no distress.  Neurologically intact.  Heart and lungs unremarkable other than occasional extrasystole.  Patient with TIA-like symptomatology without residual findings.  We will proceed with laboratory evaluation and CT imaging.  Patient concerned about CT imaging due to \"claustrophobia\".  Patient does not take any aspirin as she \"had an ulcer long ago\".     12:04 AM I met with the patient for the initial interview and physical examination. Discussed plan for treatment and workup in the ED.    12:10 AM.  Patient discussed with Dr. Lance of neurology.  If imaging is unremarkable would be agreeable with continued outpatient management.  Does recommend outpatient Plavix and low-dose aspirin.    12:57 AM.  C-reactive protein normal.  CBC with evidence of macrocytic " anemia.  Hemoglobin 10.2 with mean cell volume of 111.  Patient with mild renal insufficiency with BUN of 40 and creatinine 1.68.  Chloride slightly elevated at 1.08.  INR normal at 0.95.  Platelets slightly reduced at 146.  EKG obtained on arrival is unremarkable.  2:02 AM Rechecked the patient.  Discussed possibility of aspirin.  Novice her upper GI bleed was related to high-dose steroids and ibuprofen use.  Limited dose aspirin thought to be safe will be initiated.  Patient will also be started on Plavix.  2:27 AM.  CTA with significant stenotic disease but no evidence of acute occlusion nor of acute infarct.  Will continue outpatient management with antiplatelet therapy with follow-up with neurology.  At the conclusion of the encounter I discussed the results of all of the tests and the disposition. The questions were answered and return precautions provided. The patient or family acknowledged understanding and was agreeable with the care plan.       PPE: Provider wore gloves, N95 mask, eye protection, surgical cap.     MEDICATIONS GIVEN IN THE EMERGENCY:  Medications - No data to display    NEW PRESCRIPTIONS STARTED AT TODAY'S ER VISIT  Current Discharge Medication List      START taking these medications    Details   aspirin (ASA) 81 MG chewable tablet Take 1 tablet (81 mg) by mouth daily  Qty: 90 tablet, Refills: 0      clopidogrel (PLAVIX) 75 MG tablet Take 1 tablet (75 mg) by mouth daily  Qty: 30 tablet, Refills: 0                =================================================================    HPI    Patient information was obtained from: Patient and EMS    Use of Intrepreter: N/A         Sussy DUMONT Douglas is a 86 year old female with a pertient medical history of HTN, thyroid disease, obesity, and breast cancer who presents to the ED for evaluation of aphasia.     Around 10:45 PM this evening, patient was sitting watching TV with her son when the lights began to appear super bright and hurt her eyes.  Patient then got up and walked to another room and sat down. She tried talking to herself, but couldn't put words together. Patient then tried texting her son in the other room and had difficulty putting the words together. Her son came into the room about 5 minutes later and found the patient unable to put words together properly. Symptoms lasted for about 30-45 minutes and upon EMS arrival, symptoms had resolved. She has not had anything like this happen in the past. Denies history of CVA or TIA. At present patient states she feels fine. Patient also states she felt fine earlier throughout the day.       REVIEW OF SYSTEMS   Constitutional: Endorses brightness of lights in vision (resolved). Denies fever, chills  Respiratory:  Denies productive cough or increased work of breathing  Cardiovascular:  Denies chest pain, palpitations  GI:  Denies abdominal pain, nausea, vomiting, or change in bowel or bladder habits   Musculoskeletal:  Denies any new muscle/joint swelling  Skin:  Denies rash   Neurologic: Endorses aphasia (resolved). Denies focal weakness  All systems negative except as marked.     PAST MEDICAL HISTORY:  Past Medical History:   Diagnosis Date     Breast cancer (H)      Duodenal ulcer 04/2020     HTN (hypertension)      Osteoporosis      Thyroid disease        PAST SURGICAL HISTORY:  Past Surgical History:   Procedure Laterality Date     BACK SURGERY       IR MESENTERIC ANGIOGRAM  03/29/2020     IR VISCERAL ANGIOGRAM  3/29/2020     LUMPECTOMY BREAST       MIDLINE INSERTION - DOUBLE LUMEN  03/29/2020          UT ESOPHAGOGASTRODUODENOSCOPY TRANSORAL DIAGNOSTIC N/A 03/29/2020    Procedure: ESOPHAGOGASTRODUODENOSCOPY (EGD) with Epi injection and cautery;  Surgeon: Pacheco Echeverria DO;  Location: [facility];  Service: Gastroenterology         CURRENT MEDICATIONS:    No current facility-administered medications for this encounter.    Current Outpatient Medications:      acetaminophen (TYLENOL) 500  MG tablet, [ACETAMINOPHEN (TYLENOL) 500 MG TABLET] Take 2 tablets (1,000 mg total) by mouth 3 (three) times a day., Disp: , Rfl: 0     allopurinoL (ZYLOPRIM) 100 MG tablet, [ALLOPURINOL (ZYLOPRIM) 100 MG TABLET] Take 100 mg by mouth 2 (two) times a day. , Disp: , Rfl:      calcium-vitamin D3-vitamin K (VIACTIV) 650 mg-12.5 mcg-40 mcg Chew, [CALCIUM-VITAMIN D3-VITAMIN K (VIACTIV) 650 MG-12.5 MCG-40 MCG CHEW] Chew 1 tablet 2 (two) times a day. , Disp: , Rfl:      diclofenac sodium (VOLTAREN) 1 % Gel, [DICLOFENAC SODIUM (VOLTAREN) 1 % GEL] Apply 2 g topically 4 (four) times a day as needed. 2g to LUE, 4g to LLE, Disp: , Rfl: 0     levothyroxine (SYNTHROID, LEVOTHROID) 112 MCG tablet, [LEVOTHYROXINE (SYNTHROID, LEVOTHROID) 112 MCG TABLET] Take 112 mcg by mouth Daily at 6:00 am. , Disp: , Rfl:      lidocaine 4 % patch, [LIDOCAINE 4 % PATCH] Place 1 patch on the skin daily. Remove and discard patch with 12 hours or as directed by MD. Apply to area of back pain, Disp: , Rfl: 0     lisinopriL (PRINIVIL,ZESTRIL) 20 MG tablet, [LISINOPRIL (PRINIVIL,ZESTRIL) 20 MG TABLET] Take 20 mg by mouth every evening. , Disp: , Rfl:      melatonin 3 mg Tab tablet, [MELATONIN 3 MG TAB TABLET] Take 1 tablet (3 mg total) by mouth at bedtime as needed., Disp: , Rfl: 0     menthol-zinc oxide (CALMOSEPTINE) 0.44-20.6 % Oint ointment, [MENTHOL-ZINC OXIDE (CALMOSEPTINE) 0.44-20.6 % OINT OINTMENT] Apply two times a day to perianal area of irritation, Disp: , Rfl: 0     multivitamin with minerals (THERA-M) 9 mg iron-400 mcg Tab tablet, [MULTIVITAMIN WITH MINERALS (THERA-M) 9 MG IRON-400 MCG TAB TABLET] Take 1 tablet by mouth daily. , Disp: , Rfl:      omeprazole (PRILOSEC) 20 MG capsule, [OMEPRAZOLE (PRILOSEC) 20 MG CAPSULE] Take 1 capsule (20 mg total) by mouth 2 (two) times a day before meals., Disp: 60 capsule, Rfl: 0     polyethylene glycol (MIRALAX) 17 gram packet, [POLYETHYLENE GLYCOL (MIRALAX) 17 GRAM PACKET] Take 1 packet (17 g total) by  "mouth daily as needed., Disp: , Rfl: 0     senna-docusate (SENNOSIDES-DOCUSATE SODIUM) 8.6-50 mg tablet, [SENNA-DOCUSATE (SENNOSIDES-DOCUSATE SODIUM) 8.6-50 MG TABLET] Take 2 tablets by mouth daily. Hold for 2 or more loose stools per 24hrs, Disp: , Rfl: 0     traMADoL (ULTRAM) 50 mg tablet, [TRAMADOL (ULTRAM) 50 MG TABLET] Take 1 tablet (50 mg total) by mouth every 6 (six) hours as needed., Disp: 8 tablet, Rfl: 0    ALLERGIES:  Allergies   Allergen Reactions     Hydrocodone-Acetaminophen Nausea and Vomiting     Amoxicillin Other (See Comments)     Sores in mouth, tongue slightly swollen     Hydrochlorothiazide Unknown     Levofloxacin Other (See Comments)     \"mouth gets raw\"     Morphine Nausea and Vomiting       FAMILY HISTORY:  Family History   Problem Relation Age of Onset     Glaucoma Brother        SOCIAL HISTORY:   Social History     Socioeconomic History     Marital status:      Spouse name: Not on file     Number of children: Not on file     Years of education: Not on file     Highest education level: Not on file   Occupational History     Not on file   Tobacco Use     Smoking status: Former Smoker     Smokeless tobacco: Never Used   Substance and Sexual Activity     Alcohol use: No     Drug use: No     Sexual activity: Not on file   Other Topics Concern     Not on file   Social History Narrative     Not on file     Social Determinants of Health     Financial Resource Strain: Not on file   Food Insecurity: Not on file   Transportation Needs: Not on file   Physical Activity: Not on file   Stress: Not on file   Social Connections: Not on file   Intimate Partner Violence: Not on file   Housing Stability: Not on file       VITALS:  No data found.     PHYSICAL EXAM    Constitutional:  Awake, alert, in no apparent distress  HENT:  Normocephalic, Atraumatic. Bilateral external ears normal. Oropharynx moist. Nose normal. Neck- Normal range of motion with no guarding, No midline cervical tenderness, Supple, " No stridor.   Eyes:  PERRL, EOMI with no signs of entrapment, Conjunctiva normal, No discharge.   Respiratory:  Normal breath sounds, No respiratory distress, No wheezing.    Cardiovascular:  Normal heart rate, Normal rhythm, No appreciable rubs or gallops.   GI:  Soft, No tenderness, No distension, No palpable masses  Musculoskeletal:  Intact distal pulses, No edema. Good range of motion in all major joints. No tenderness to palpation or major deformities noted.  Integument:  Warm, Dry, No erythema, No rash.   Neurologic:  Alert & oriented, Normal motor function, Normal sensory function, No focal deficits noted.   Psychiatric:  Affect normal, Judgment normal, Mood normal.     LAB:  All pertinent labs reviewed and interpreted.       RADIOLOGY:  Reviewed all pertinent imaging. Please see official radiology report.  No orders to display       EKG:    Normal sinus rhythm.  Rate of 90.  Low voltage QRS.  Normal ST segments.  Essentially unchanged compared to tracing of April 29, 2021  I have independently reviewed and interpreted the EKG(s) documented above.    PROCEDURES:   National Institutes [employer] Health Stroke Scale  Exam Interval:    Score    Level of consciousness: 0    LOC questions: 0    LOC commands: 0    Best gaze: 0    Visual: 0    Facial palsy: 0    Motor arm (left): 0    Motor arm (right): 0    Motor leg (left): 0    Motor leg (right): 0    Limb ataxia: 0    Sensory: 0    Best language: 0    Dysarthria: 0    Extinction and inattention: 0        Total Score: 0          I, Kalpana Ni, am serving as a scribe to document services personally performed by Stiven Zafar MD, based on my observation and the provider's statements to me. I, Stiven Zafar MD attest that Kalpana Ni is acting in a scribe capacity, has observed my performance of the services and has documented them in accordance with my direction.    Stiven Zafar M.D.  Emergency Medicine  [facility] EMERGENCY  DEPARTMENT       Stiven Zafar MD  12/24/21 [id number]

## 2021-12-24 NOTE — TELEPHONE ENCOUNTER
[facility] Call Center    Phone Message    May a detailed message be left on voicemail: yes     Reason for Call: Other: patient stated that [place] told her to call Dr. Lance and make an appt within 3 days before the 27th or she may have another stroke....Please call to assist.     Action Taken: Other: [facility]    Travel Screening: Not Applicable

## 2021-12-24 NOTE — ED TRIAGE NOTES
Pt  Presents via ambulance after having an episode at 2245 with aphasia and unable to send text.  This lasted about 15 minutes per son who was present when this happened

## 2021-12-24 NOTE — ED NOTES
Bed: JNED-01  Expected date: 12/23/21  Expected time: 11:50 PM  Means of arrival: Ambulance  Comments:  Shashi 86 f stroke sx resolved.

## 2021-12-29 ENCOUNTER — OFFICE VISIT (OUTPATIENT)
Dept: NEUROLOGY | Facility: CLINIC | Age: 86
End: 2021-12-29
Payer: MEDICARE

## 2021-12-29 VITALS
HEART RATE: 100 BPM | RESPIRATION RATE: 24 BRPM | SYSTOLIC BLOOD PRESSURE: 130 MMHG | BODY MASS INDEX: 34.73 KG/M2 | DIASTOLIC BLOOD PRESSURE: 64 MMHG | WEIGHT: 196 LBS | HEIGHT: 63 IN

## 2021-12-29 DIAGNOSIS — G45.9 TIA (TRANSIENT ISCHEMIC ATTACK): Primary | ICD-10-CM

## 2021-12-29 DIAGNOSIS — G45.0 VERTEBROBASILAR INSUFFICIENCY: ICD-10-CM

## 2021-12-29 DIAGNOSIS — I67.2 INTRACRANIAL ATHEROSCLEROSIS: ICD-10-CM

## 2021-12-29 PROBLEM — S32.020A CLOSED COMPRESSION FRACTURE OF L2 VERTEBRA, INITIAL ENCOUNTER (H): Status: RESOLVED | Noted: 2021-06-02 | Resolved: 2021-12-29

## 2021-12-29 PROBLEM — K92.1 GASTROINTESTINAL HEMORRHAGE WITH MELENA: Status: RESOLVED | Noted: 2020-03-26 | Resolved: 2021-12-29

## 2021-12-29 PROBLEM — Z87.81 HISTORY OF HUMERUS FRACTURE: Status: RESOLVED | Noted: 2021-04-29 | Resolved: 2021-12-29

## 2021-12-29 PROBLEM — R06.09 DOE (DYSPNEA ON EXERTION): Status: RESOLVED | Noted: 2018-04-03 | Resolved: 2021-12-29

## 2021-12-29 PROBLEM — K92.2 GI BLEED: Status: RESOLVED | Noted: 2020-03-26 | Resolved: 2021-12-29

## 2021-12-29 PROBLEM — K26.9 DUODENAL ULCER DUE TO NONSTEROIDAL ANTI-INFLAMMATORY DRUG (NSAID): Status: RESOLVED | Noted: 2020-04-03 | Resolved: 2021-12-29

## 2021-12-29 PROBLEM — T39.395A DUODENAL ULCER DUE TO NONSTEROIDAL ANTI-INFLAMMATORY DRUG (NSAID): Status: RESOLVED | Noted: 2020-04-03 | Resolved: 2021-12-29

## 2021-12-29 PROBLEM — S42.252A DISPLACED FRACTURE OF GREATER TUBEROSITY OF LEFT HUMERUS, INITIAL ENCOUNTER FOR CLOSED FRACTURE: Status: RESOLVED | Noted: 2021-04-29 | Resolved: 2021-12-29

## 2021-12-29 PROCEDURE — 99205 OFFICE O/P NEW HI 60 MIN: CPT | Performed by: PSYCHIATRY & NEUROLOGY

## 2021-12-29 RX ORDER — CLOPIDOGREL BISULFATE 75 MG/1
75 TABLET ORAL DAILY
Qty: 30 TABLET | Refills: 2 | Status: SHIPPED | OUTPATIENT
Start: 2021-12-29 | End: 2022-03-29

## 2021-12-29 RX ORDER — ASPIRIN 81 MG/1
81 TABLET, CHEWABLE ORAL DAILY
Qty: 90 TABLET | Refills: 0 | Status: SHIPPED | OUTPATIENT
Start: 2021-12-29

## 2021-12-29 RX ORDER — ATORVASTATIN CALCIUM 20 MG/1
20 TABLET, FILM COATED ORAL DAILY
Qty: 30 TABLET | Refills: 11 | Status: SHIPPED | OUTPATIENT
Start: 2021-12-29 | End: 2022-10-24

## 2021-12-29 RX ORDER — CLOPIDOGREL BISULFATE 75 MG/1
75 TABLET ORAL DAILY
Qty: 30 TABLET | Refills: 0 | Status: SHIPPED | OUTPATIENT
Start: 2021-12-29 | End: 2021-12-29

## 2021-12-29 ASSESSMENT — MIFFLIN-ST. JEOR: SCORE: 1308.18

## 2021-12-29 NOTE — PATIENT INSTRUCTIONS
Patient Education     Taking Aspirin for Atherosclerosis     Aspirin is a medicine often prescribed to treat atherosclerosis. This condition affects your arteries. These are the blood vessels that carry blood away from your heart out to your body. Having atherosclerosis means you re at greater risk of a heart attack or stroke. Aspirin can help prevent these from happening.  How atherosclerosis affects your arteries   A fatty material (plaque) can build up in your arteries. This makes it harder for blood to flow through them. A blood clot can then form on the plaque. This may block the artery, cutting off blood flow. This can cause conditions such as coronary artery disease (CAD) and peripheral arterial disease (PAD).    CAD occurs when plaque builds up in the coronary arteries. These arteries supply the heart with oxygen-rich blood. This can lead to a heart attack.    PAD occurs when plaque forms in leg arteries.  The same things that cause CAD and PAD can also cause plaque to form in other arteries in your body, such as those in the brain. When plaque occurs in the brain, it raises your risk stroke.  What aspirin does  Aspirin is a blood-thinner (antiplatelet medicine). It helps keep blood clots from forming. This reduces the risk of blockage. Aspirin can be taken daily if you are at high risk of or have already had a heart attack or stroke. It's also used after a procedure called a stent placement. This is when a tiny wire mesh tube, or stent, is placed in an artery to keep it open. Aspirin helps prevent blood clots from forming on the stent.  Taking aspirin safely  Tell your healthcare provider about any medicines you are taking. This includes:    All prescription medicines    Over-the-counter medicines    Herbs, vitamins, and other supplements  Also mention if you have a history of ulcers or bleeding problems. Ask whether you will need to stop taking aspirin before having surgery or dental work. Always take  medicines as directed.  Tips for taking aspirin    Have a routine. For example, take aspirin with the same meal each day.    Don t take more than prescribed. A low dose gives the same benefit as a higher one, with a lower risk of side effects.    Don t skip doses. Aspirin needs to be taken each day to work well.    Keep track of what you take. A pillbox with days of the week can help, especially if you take several medicines. Or use a list or chart to keep track.    When to call your healthcare provider  Side effects of aspirin are not usually serious. If you do have problems, changing your dose may help. Call your healthcare provider if you have any of the following:    Excessive bruising (some bruising is normal)    Nosebleeds, bleeding gums, or other excessive bleeding    An upset stomach or stomach pain  James last reviewed this educational content on 12/1/2019 2000-2021 [employer]. All rights reserved. This information is not intended as a substitute for professional medical care. Always follow your healthcare professional's instructions.

## 2021-12-29 NOTE — LETTER
December 29, 2021      Sussy Cole  [address]  [place] [address]        To Whom It May Concern:    Sussy Cole was seen in our clinic on 12/29/2021. She was accompanied by her daughter Rukhsana Cole for the visit      Sincerely,        Alfredo Lance MD

## 2021-12-29 NOTE — PROGRESS NOTES
NEUROLOGY CONSULTATION NOTE       [facility] [place]  [address] [place] [address]  Tel: [phone]  Fax: [phone]  [email or web address]     Sussy Cole,  1935, MRN [id number]  PCP: Rosalee Bravo  Date: 2021     ASSESSMENT & PLAN     Visit Diagnosis  1. TIA (transient ischemic attack)  2. Vertebrobasilar insufficiency     Transient ischemic attack  86-year-old female with history of HTN, HLD, CKD stage III, gout, breast cancer, B12 deficiency who was seen in the emergency room with transient speech difficulty.  This resolved spontaneously.  Her CTA head and neck shows fairly extensive intracranial atherosclerotic disease with occlusion of bilateral vertebral artery, basilar artery suggesting vertebrobasilar insufficiency.  I have recommended:    1.  Dual antiplatelet therapy with baby aspirin and Plavix for 90 days.  Afterwards she can switch to aspirin monotherapy  2.  Check lipid profile and start Lipitor 20 mg daily with goal of LDL less than 70  3.  Echocardiogram  4.  30-day event monitor  5.  Vascular risk factors modification: Healthy diet (fruits, vegetables, low fat dairy & reduced saturated fat), weight loss, exercise at least 30 minutes 5 days/week, BMI goal <25.  Keep systolic blood pressure goal <130.  LDL goal <70.  Hemoglobin A1c goal <7. If applicable, STOP smoking  6.  Follow-up will be in 3 months    Thank you again for this referral, please feel free to contact me if you have any questions.    Alfredo Lance MD  [facility]  (Formerly, [facility]     REASON FOR CONSULTATION Consult        HISTORY OF PRESENT ILLNESS     We have been requested by Dr. Rosalee Bravo to evaluate Sussydawson Cole who is a 86 year old  female for TIA    Patient is a 86-year-old female with history of HTN, HLD, CKD stage III, gout,breast cancer, vitamin B12 deficiency who was seen at [facility]  emergency room on 12/24/2021 with acute onset of visual symptoms.  Patient was watching TV and suddenly felt lights were super bright and started hurting her eyes.  She got up and walked to another room and sat down and as she tried to talk to herself she could not put her words together.  She attempted to text her son in another room and had difficulty putting the words.  This lasted about 30 to 45 minutes and finally paramedics were called.  Her symptoms gradually improved.  She had CT of the head and CTA that did not show any acute abnormality but did have extensive intracranial atherosclerotic disease with vertebrobasilar insufficiency.  She was started on dual antiplatelet therapy and asked to follow-up with neurology.  Since her visit to the ER she reports further episodes of focal weakness numbness or visual symptoms.  She is quite worried about the possibility of GI bleed given that she is on 2 antiplatelets agent.  She also reports that she was evaluated at [facility] and was diagnosed with carpal tunnel syndrome and cubital tunnel syndrome and is scheduled for ulnar transposition and is wondering if she needs to put that on hold given her recent TIA     PROBLEM LIST   Patient Active Problem List   Diagnosis Code     AK (actinic keratosis) L57.0     B12 deficiency E53.8     Breast cancer (H) C50.919     Carpal tunnel syndrome of right wrist G56.01     Gout M10.9     Hypertension I10     Hypothyroidism E03.9     Macrocytic anemia D53.9     Osteoporosis M81.0     Primary osteoarthritis of both knees M17.0     Pseudophakia of both eyes Z96.1     Rotator cuff tendonitis M75.80     Skin cancer C44.90     Tinnitus of both ears H93.13     Venous stasis dermatitis of both lower extremities I87.2     Obesity (BMI 35.0-39.9) with comorbidity (H) E66.01     Ulnar neuropathy of right upper extremity G56.21     Chronic kidney disease, stage 3 (H) N18.30     TIA (transient ischemic attack) G45.9      "Vertebrobasilar insufficiency G45.0     Intracranial atherosclerosis I67.2         PAST MEDICAL & SURGICAL HISTORY     Past Medical History:   Patient  has a past medical history of Breast cancer (H), Closed compression fracture of L2 vertebra, initial encounter (H) (6/2/2021), Duodenal ulcer (04/2020), Duodenal ulcer due to nonsteroidal anti-inflammatory drug (NSAID) (4/3/2020), Gastrointestinal hemorrhage with melena (3/26/2020), History of humerus fracture (4/29/2021), HTN (hypertension), Osteoporosis, Status post shoulder surgery (3/24/2009), and Thyroid disease.    Surgical History:  She  has a past surgical history that includes IR Visceral Angiogram (3/29/2020); Midline Insertion - Double Lumen (03/29/2020); Ir Mesenteric Angiogram (03/29/2020); Pr Esophagogastroduodenoscopy Transoral Diagnostic (N/A, 03/29/2020); Lumpectomy breast; and back surgery.     SOCIAL HISTORY     Reviewed, and she  reports that she has quit smoking. She has never used smokeless tobacco. She reports that she does not drink alcohol and does not use drugs.     FAMILY HISTORY     Reviewed, and family history includes Glaucoma in her brother.     ALLERGIES     Allergies   Allergen Reactions     Hydrocodone-Acetaminophen Nausea and Vomiting     Amoxicillin Other (See Comments)     Sores in mouth, tongue slightly swollen     Hydrochlorothiazide Unknown     Levofloxacin Other (See Comments)     \"mouth gets raw\"     Morphine Nausea and Vomiting         REVIEW OF SYSTEMS     A 12 point review of system was performed and was negative except as outlined in the history of present illness.     HOME MEDICATIONS     Current Outpatient Rx   Medication Sig Dispense Refill     acetaminophen (TYLENOL) 500 MG tablet [ACETAMINOPHEN (TYLENOL) 500 MG TABLET] Take 2 tablets (1,000 mg total) by mouth 3 (three) times a day.  0     allopurinoL (ZYLOPRIM) 100 MG tablet [ALLOPURINOL (ZYLOPRIM) 100 MG TABLET] Take 100 mg by mouth 2 (two) times a day.        " "aspirin (ASA) 81 MG chewable tablet Take 1 tablet (81 mg) by mouth daily 90 tablet 0     atorvastatin (LIPITOR) 20 MG tablet Take 1 tablet (20 mg) by mouth daily 30 tablet 11     calcium-vitamin D3-vitamin K (VIACTIV) 650 mg-12.5 mcg-40 mcg Chew [CALCIUM-VITAMIN D3-VITAMIN K (VIACTIV) 650 MG-12.5 MCG-40 MCG CHEW] Chew 1 tablet 2 (two) times a day.        clopidogrel (PLAVIX) 75 MG tablet Take 1 tablet (75 mg) by mouth daily 30 tablet 2     diclofenac sodium (VOLTAREN) 1 % Gel [DICLOFENAC SODIUM (VOLTAREN) 1 % GEL] Apply 2 g topically 4 (four) times a day as needed. 2g to LUE, 4g to LLE  0     levothyroxine (SYNTHROID, LEVOTHROID) 112 MCG tablet [LEVOTHYROXINE (SYNTHROID, LEVOTHROID) 112 MCG TABLET] Take 112 mcg by mouth Daily at 6:00 am.        lidocaine 4 % patch [LIDOCAINE 4 % PATCH] Place 1 patch on the skin daily. Remove and discard patch with 12 hours or as directed by MD. Apply to area of back pain  0     lisinopriL (PRINIVIL,ZESTRIL) 20 MG tablet [LISINOPRIL (PRINIVIL,ZESTRIL) 20 MG TABLET] Take 20 mg by mouth every evening.        multivitamin with minerals (THERA-M) 9 mg iron-400 mcg Tab tablet [MULTIVITAMIN WITH MINERALS (THERA-M) 9 MG IRON-400 MCG TAB TABLET] Take 1 tablet by mouth daily.        omeprazole (PRILOSEC) 20 MG capsule [OMEPRAZOLE (PRILOSEC) 20 MG CAPSULE] Take 1 capsule (20 mg total) by mouth 2 (two) times a day before meals. 60 capsule 0     senna-docusate (SENNOSIDES-DOCUSATE SODIUM) 8.6-50 mg tablet [SENNA-DOCUSATE (SENNOSIDES-DOCUSATE SODIUM) 8.6-50 MG TABLET] Take 2 tablets by mouth daily. Hold for 2 or more loose stools per 24hrs  0         PHYSICAL EXAM     Vital signs  /64   Pulse 100   Resp 24   Ht 1.6 m (5' 3\")   Wt 88.9 kg (196 lb)   BMI 34.72 kg/m      Weight:   196 lbs 0 oz    Patient is alert and oriented times 3 no acute distress somewhat anxious.  Vital signs were reviewed and are documented in electronic medical record.  Neck supple.  Neurologically speech was " normal cranial nerves II through XII are intact motor strength 5/5 except right hand intrinsic 4/5.  Reflexes 1+ toes downgoing.  She has dysmetria on finger-nose testing more so on the left side.  Sensation grossly intact.  She has a wide-based gait     DIAGNOSTIC STUDIES     PERTINENT RADIOLOGY  Following imaging studies were reviewed:     CTA HEAD & NECK 12/24/2021  HEAD CT:  1.  No acute intracranial process.  2.  Age-related changes described above.  3.  Right maxillary sinus air-fluid level. Please correlate for acute sinusitis.      HEAD CTA:   Right precavernous ICA mild stenosis. Right carotid siphon moderate stenosis.     Left precavernous ICA mild stenosis. Left cavernous ICA mild stenosis. Left carotid siphon mild stenosis. Left distal carotid siphon/proximal supraclinoid ICA moderate stenosis.     Right proximal V4 segment near dural ring severe stenosis.      Left proximal V4 segment severe stenosis.     Inferior basilar artery severe stenosis secondary to calcified plaque.     Otherwise, no significant stenosis/occlusion.      NECK CTA:  Right carotid bifurcation mild to moderate stenosis. Atherosclerotic plaque results in 50-70% stenosis in the right carotid bulb.       Left distal common carotid artery moderate to severe stenosis. Right external carotid artery origin severe stenosis. Right carotid bifurcation moderate stenosis.      Right vertebral artery origin severe stenosis.    ECHOCARDIOGRAM 3/26/2020  1. Normal left ventricular size and systolic performance with a visually estimated ejection fraction of 65-70%.   2. There is mild to moderate concentric increase in left ventricular wall thickness.   3. No significant valvular heart disease is identified on this study.   4. Normal right ventricular size and systolic performance.      PERTINENT LABS  Following labs were reviewed:  Admission on 12/24/2021, Discharged on 12/24/2021   Component Date Value     INR 12/24/2021 0.95      Sodium  12/24/2021 143      Potassium 12/24/2021 4.7      Chloride 12/24/2021 108*     Carbon Dioxide (CO2) 12/24/2021 24      Anion Gap 12/24/2021 11      Urea Nitrogen 12/24/2021 41*     Creatinine 12/24/2021 1.68*     Calcium 12/24/2021 9.5      Glucose 12/24/2021 101      GFR Estimate 12/24/2021 30*     CRP 12/24/2021 <0.1      WBC Count 12/24/2021 5.5      RBC Count 12/24/2021 2.86*     Hemoglobin 12/24/2021 10.2*     Hematocrit 12/24/2021 31.6*     MCV 12/24/2021 111*     MCH 12/24/2021 35.7*     MCHC 12/24/2021 32.3      RDW 12/24/2021 15.0      Platelet Count 12/24/2021 146*     Systolic Blood Pressure 12/24/2021 143      Diastolic Blood Pressure 12/24/2021 58      Ventricular Rate 12/24/2021 90      Atrial Rate 12/24/2021 90      AZ Interval 12/24/2021 160      QRS Duration 12/24/2021 88      QT 12/24/2021 356      QTc 12/24/2021 435      P Axis 12/24/2021 71      R AXIS 12/24/2021 36      T Axis 12/24/2021 51      Interpretation ECG 12/24/2021                      Value:Sinus rhythm  Low voltage QRS  Borderline ECG  When compared with ECG of 29-APR-2021 21:12,  Nonspecific T wave abnormality no longer evident in Inferior leads  Confirmed by SEE ED PROVIDER NOTE FOR, ECG INTERPRETATION ([id number]),  PREET TAN ([id number]) on 12/24/2021 5:45:35 AM            Total time spent for face to face visit, reviewing labs/imaging studies, counseling and coordination of care was: 1 Hour spent on the date of the encounter doing chart review, review of outside records, review of test results, interpretation of tests, patient visit and documentation       This note was dictated using voice recognition software.  Any grammatical or context distortions are unintentional and inherent to the software.    Orders Placed This Encounter   Procedures     Lipid Profile     Cardiac Event Monitor Adult/Pediatric     Echocardiogram Complete      New Prescriptions    ATORVASTATIN (LIPITOR) 20 MG TABLET    Take 1 tablet (20 mg) by mouth  daily      Modified Medications    Modified Medication Previous Medication    ASPIRIN (ASA) 81 MG CHEWABLE TABLET aspirin (ASA) 81 MG chewable tablet       Take 1 tablet (81 mg) by mouth daily    Take 1 tablet (81 mg) by mouth daily    CLOPIDOGREL (PLAVIX) 75 MG TABLET clopidogrel (PLAVIX) 75 MG tablet       Take 1 tablet (75 mg) by mouth daily    Take 1 tablet (75 mg) by mouth daily

## 2021-12-29 NOTE — TELEPHONE ENCOUNTER
Called Sussy and MICAH- if she reaches the central call center to ask for the clinic here so we can get her added on to Dr. Nielsen schedule  Mavis Dye CMA on 12/29/2021 at 9:46 AM

## 2021-12-29 NOTE — NURSING NOTE
"Sussy Cole is a 86 year old female who presents for:  Chief Complaint   Patient presents with     Consult     TIA         Initial Vitals:  /64   Pulse 100   Resp 24   Ht 1.6 m (5' 3\")   Wt 88.9 kg (196 lb)   BMI 34.72 kg/m   Estimated body mass index is 34.72 kg/m  as calculated from the following:    Height as of this encounter: 1.6 m (5' 3\").    Weight as of this encounter: 88.9 kg (196 lb).. Body surface area is 1.99 meters squared. BP completed using cuff size: wrist cuff  Data Unavailable    Nursing Comments: Patient wants to make sure that she is able to take the ASA with the blood thinner.     Oxana Whitt, CMA    "

## 2022-01-12 ENCOUNTER — TELEPHONE (OUTPATIENT)
Dept: NEUROLOGY | Facility: CLINIC | Age: 87
End: 2022-01-12
Payer: MEDICARE

## 2022-01-12 NOTE — TELEPHONE ENCOUNTER
[facility] Call Center    Phone Message    May a detailed message be left on voicemail: yes     Reason for Call: Other: Sussy calling to request a call back to discuss if she is needing to keep the appointments on 1/31/22 and is she to stay on her blood thinners until her follow up appointment on 3/29/22 or was she only she only supposed to take them for 30 days. She is also inquiring about changing providers due to not feeling as her and Dr. Lance are a good fit. Please call Sussy at your earliest convenience to discuss.     Action Taken: Message routed to:  Other[facility]    Travel Screening: Not Applicable

## 2022-01-12 NOTE — TELEPHONE ENCOUNTER
RN spoke with pt regarding her questions on whether or not to continue on the Plavix and Aspirin. Per Dr. Lance's last visit order, pt is to take Plavix and Aspirin for 90 days and then only take the Aspirin. Pt also told that she still needs to complete the holter monitor testing and the echocardiogram that are scheduled for 1/31/22. Pt also decided to keep her appt with Dr. Lance on 3/29/22, instead of attempting to switch to a different provider.     Jeny Gama RN on 1/12/2022 at 4:14 PM

## 2022-01-31 ENCOUNTER — HOSPITAL ENCOUNTER (OUTPATIENT)
Dept: CARDIOLOGY | Facility: HOSPITAL | Age: 87
End: 2022-01-31
Attending: PSYCHIATRY & NEUROLOGY
Payer: MEDICARE

## 2022-01-31 DIAGNOSIS — G45.9 TIA (TRANSIENT ISCHEMIC ATTACK): ICD-10-CM

## 2022-01-31 DIAGNOSIS — G45.0 VERTEBROBASILAR INSUFFICIENCY: ICD-10-CM

## 2022-01-31 DIAGNOSIS — I67.2 INTRACRANIAL ATHEROSCLEROSIS: ICD-10-CM

## 2022-01-31 LAB — LVEF ECHO: NORMAL

## 2022-01-31 PROCEDURE — 93306 TTE W/DOPPLER COMPLETE: CPT | Mod: 26 | Performed by: INTERNAL MEDICINE

## 2022-01-31 PROCEDURE — 93270 REMOTE 30 DAY ECG REV/REPORT: CPT

## 2022-01-31 PROCEDURE — 93306 TTE W/DOPPLER COMPLETE: CPT

## 2022-03-03 PROCEDURE — 93228 REMOTE 30 DAY ECG REV/REPORT: CPT | Performed by: INTERNAL MEDICINE

## 2022-03-11 ENCOUNTER — TELEPHONE (OUTPATIENT)
Dept: NEUROLOGY | Facility: CLINIC | Age: 87
End: 2022-03-11
Payer: MEDICARE

## 2022-03-11 NOTE — TELEPHONE ENCOUNTER
Health Call Center    Phone Message    May a detailed message be left on voicemail: yes     Reason for Call:  Patient called regarding a letter she received to complete Lipid Panel done before December. Patient asking for a call back from nurse to discuss if she should complete new labs before her appointmen or request that her clinic fax  previous lab results? . Please call her cell phone. Patient is daniel for follow up 3-29-22      Action Taken: Message routed to:  [facility]    Travel Screening: Not Applicable

## 2022-03-11 NOTE — TELEPHONE ENCOUNTER
Left voicemail for pt stating that if she has completed a lipid panel since her last appointment with Dr. Lance she is more than welcome to fax us those results. Fax number was left in voicemail. RN also stated that if she has not had a recent lipid panel then she should have this completed prior to her appt with Dr. Lance. Pt to call back with any questions.     Jeny Gama RN on 3/11/2022 at 3:17 PM

## 2022-03-22 ENCOUNTER — TRANSFERRED RECORDS (OUTPATIENT)
Dept: HEALTH INFORMATION MANAGEMENT | Facility: CLINIC | Age: 87
End: 2022-03-22
Payer: MEDICARE

## 2022-03-22 LAB
CHOLESTEROL (EXTERNAL): 106 MG/DL (ref 100–199)
CREATININE (EXTERNAL): 1.23 MG/DL (ref 0.57–1.11)
GFR ESTIMATED (EXTERNAL): 43 ML/MIN/1.73M2
GLUCOSE (EXTERNAL): 101 MG/DL (ref 65–100)
HBA1C MFR BLD: 5.3 %
HDLC SERPL-MCNC: 45 MG/DL
LDL CHOLESTEROL CALCULATED (EXTERNAL): 50 MG/DL
NON HDL CHOLESTEROL (EXTERNAL): 61 MG/DL
POTASSIUM (EXTERNAL): 5.1 MMOL/L (ref 3.5–5)
TRIGLYCERIDES (EXTERNAL): 53 MG/DL
TSH SERPL-ACNC: 0.59 ULU/ML (ref 0.35–4.94)

## 2022-03-28 NOTE — TELEPHONE ENCOUNTER
Pt will like to know if  received lab results from PCP that was done last week. Please confirm and advise if pt should keep 3/29 appt.

## 2022-03-28 NOTE — TELEPHONE ENCOUNTER
Lipid panel is available in Care Everywhere. Encouraged pt to keep appt for tomorrow.     Jeny Gama RN on 3/28/2022 at 11:03 AM

## 2022-03-29 ENCOUNTER — OFFICE VISIT (OUTPATIENT)
Dept: NEUROLOGY | Facility: CLINIC | Age: 87
End: 2022-03-29
Payer: MEDICARE

## 2022-03-29 VITALS
DIASTOLIC BLOOD PRESSURE: 45 MMHG | WEIGHT: 175 LBS | HEART RATE: 74 BPM | BODY MASS INDEX: 31.01 KG/M2 | HEIGHT: 63 IN | SYSTOLIC BLOOD PRESSURE: 101 MMHG

## 2022-03-29 DIAGNOSIS — G45.9 TIA (TRANSIENT ISCHEMIC ATTACK): Primary | ICD-10-CM

## 2022-03-29 PROBLEM — H90.3 SENSORINEURAL HEARING LOSS (SNHL) OF BOTH EARS: Status: ACTIVE | Noted: 2022-03-29

## 2022-03-29 PROCEDURE — 99214 OFFICE O/P EST MOD 30 MIN: CPT | Performed by: PSYCHIATRY & NEUROLOGY

## 2022-03-29 RX ORDER — CLOTRIMAZOLE AND BETAMETHASONE DIPROPIONATE 10; .64 MG/G; MG/G
CREAM TOPICAL 2 TIMES DAILY PRN
COMMUNITY
Start: 2021-05-03 | End: 2024-08-30

## 2022-03-29 RX ORDER — IPRATROPIUM BROMIDE AND ALBUTEROL SULFATE 2.5; .5 MG/3ML; MG/3ML
3 SOLUTION RESPIRATORY (INHALATION)
Status: ON HOLD | COMMUNITY
Start: 2022-03-22 | End: 2022-04-14

## 2022-03-29 RX ORDER — BENZOCAINE/MENTHOL 6 MG-10 MG
LOZENGE MUCOUS MEMBRANE 3 TIMES DAILY PRN
COMMUNITY
Start: 2021-05-19 | End: 2024-08-08

## 2022-03-29 NOTE — PROGRESS NOTES
NEUROLOGY FOLLOW UP VISIT  NOTE       [facility] NEUROLOGY [place]  [address] [place] [address]  Tel: [phone]  Fax: [phone]  [email or web address]     Sussy Cole,  1935, MRN [id number]  PCP: Rosalee Bravo  Date: 2022      ASSESSMENT & PLAN     Visit Diagnosis  1. TIA (transient ischemic attack)     Transient ischemic attack  86-year-old female with history of HTN, HLD, CKD stage III, breast cancer, B12 deficiency, vertebrobasilar insufficiency who returns for follow-up.  Since her last visit she had a 30-day event monitor and echocardiogram that was normal.  I have recommended:    1.  Switch to regular strength aspirin 325 mg daily.  Patient is quite concerned given her history of gastric ulcers in the past and reluctant to take aspirin but I have strongly encouraged her to take full-strength enteric-coated aspirin (new recommendations) but if she does not feel comfortable at least take 81 mg daily  2.  Continue Lipitor 20 mg daily  3.  Follow-up on as needed basis    Thank you again for this referral, please feel free to contact me if you have any questions.    Alfredo Lance MD  [facility]  (Formerly, [facility]     HISTORY OF PRESENT ILLNESS     Patient is a 86-year-old female with history of HTN, HLD, CKD stage III, breast cancer, B12 deficiency and gout who was last seen on 2021 after she had a transient episode of speech difficulty.  This resolved spontaneously.  She had CTA of the head and neck that showed fairly extensive intracranial atherosclerotic disease with occlusion of bilateral vertebral arteries, basilar artery suggesting vertebrobasilar insufficiency.  She was started on dual antiplatelet therapy and lipid profile was checked that showed a LDL of 50.  Also echocardiogram was done that showed a normal ejection fraction with concentric left ventricular hypertrophy.  No cardiac  source of emboli noted.  30-day event monitor was normal.  Since her last visit she reports no further episodes of speech difficulty or focal weakness.    As noted above she was initially seen in [facility] emergency room on 12/24/2021 with acute onset of visual symptoms.  She was watching TV and suddenly felt lights were super bright and started hurting her eyes.  She got up and walked around and sat down and tried to talk to herself but she could not put the words together.  She tried to text her son who was in another room and had difficulty typing the words.  The whole episode lasted for about 30 to 45 minutes.  Eventually paramedics were called and her symptoms gradually improved.  She was taken to [facility] emergency room where CT of the head and CTA showed extensive intracranial atherosclerotic disease.  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     Gout M10.9     Hypertension I10     Hypothyroidism E03.9     Macrocytic anemia D53.9     Osteoporosis M81.0     Primary osteoarthritis of both knees M17.0     Pseudophakia of both eyes Z96.1     Rotator cuff tendonitis M75.80     Skin cancer C44.90     Tinnitus of both ears H93.13     Venous stasis dermatitis of both lower extremities I87.2     Obesity (BMI 35.0-39.9) with comorbidity (H) E66.01     Ulnar neuropathy of right upper extremity G56.21     Chronic kidney disease, stage 3 (H) N18.30     TIA (transient ischemic attack) G45.9     Vertebrobasilar insufficiency G45.0     Intracranial atherosclerosis I67.2         PAST MEDICAL & SURGICAL HISTORY     Past Medical History:   Patient  has a past medical history of Breast cancer (H), Closed compression fracture of L2 vertebra, initial encounter (H) (6/2/2021), Duodenal ulcer (04/2020), Duodenal ulcer due to  "nonsteroidal anti-inflammatory drug (NSAID) (4/3/2020), Gastrointestinal hemorrhage with melena (3/26/2020), History of humerus fracture (4/29/2021), HTN (hypertension), Osteoporosis, Status post shoulder surgery (3/24/2009), and Thyroid disease.    Surgical History:  She  has a past surgical history that includes IR Visceral Angiogram (3/29/2020); Midline Insertion - Double Lumen (03/29/2020); Ir Mesenteric Angiogram (03/29/2020); Pr Esophagogastroduodenoscopy Transoral Diagnostic (N/A, 03/29/2020); Lumpectomy breast; and back surgery.     SOCIAL HISTORY     Reviewed, and she  reports that she has quit smoking. She has never used smokeless tobacco. She reports that she does not drink alcohol and does not use drugs.     FAMILY HISTORY     Reviewed, and family history includes Glaucoma in her brother.     ALLERGIES     Allergies   Allergen Reactions     Hydrocodone-Acetaminophen Nausea and Vomiting     Amoxicillin Other (See Comments)     Sores in mouth, tongue slightly swollen     Hydrochlorothiazide Unknown     Levofloxacin Other (See Comments)     \"mouth gets raw\"     Morphine Nausea and Vomiting         REVIEW OF SYSTEMS     A 12 point review of system was performed and was negative except as outlined in the history of present illness.     HOME MEDICATIONS     Current Outpatient Rx   Medication Sig Dispense Refill     allopurinoL (ZYLOPRIM) 100 MG tablet [ALLOPURINOL (ZYLOPRIM) 100 MG TABLET] Take 100 mg by mouth 2 (two) times a day.        aspirin (ASA) 81 MG chewable tablet Take 1 tablet (81 mg) by mouth daily 90 tablet 0     atorvastatin (LIPITOR) 20 MG tablet Take 1 tablet (20 mg) by mouth daily 30 tablet 11     ipratropium - albuterol 0.5 mg/2.5 mg/3 mL (DUONEB) 0.5-2.5 (3) MG/3ML neb solution Inhale 3 mLs into the lungs       levothyroxine (SYNTHROID, LEVOTHROID) 112 MCG tablet [LEVOTHYROXINE (SYNTHROID, LEVOTHROID) 112 MCG TABLET] Take 112 mcg by mouth Daily at 6:00 am.        lisinopriL (PRINIVIL,ZESTRIL) " "20 MG tablet [LISINOPRIL (PRINIVIL,ZESTRIL) 20 MG TABLET] Take 20 mg by mouth every evening.        omeprazole (PRILOSEC) 20 MG capsule [OMEPRAZOLE (PRILOSEC) 20 MG CAPSULE] Take 1 capsule (20 mg total) by mouth 2 (two) times a day before meals. 60 capsule 0     senna-docusate (SENNOSIDES-DOCUSATE SODIUM) 8.6-50 mg tablet [SENNA-DOCUSATE (SENNOSIDES-DOCUSATE SODIUM) 8.6-50 MG TABLET] Take 2 tablets by mouth daily. Hold for 2 or more loose stools per 24hrs  0     acetaminophen (TYLENOL) 500 MG tablet [ACETAMINOPHEN (TYLENOL) 500 MG TABLET] Take 2 tablets (1,000 mg total) by mouth 3 (three) times a day.  0     calcium-vitamin D3-vitamin K (VIACTIV) 650 mg-12.5 mcg-40 mcg Chew [CALCIUM-VITAMIN D3-VITAMIN K (VIACTIV) 650 MG-12.5 MCG-40 MCG CHEW] Chew 1 tablet 2 (two) times a day.        clotrimazole-betamethasone (LOTRISONE) 1-0.05 % external cream APPLY TOPICALLY TO THE FEET TWICE DAILY AS NEEDED       diclofenac sodium (VOLTAREN) 1 % Gel [DICLOFENAC SODIUM (VOLTAREN) 1 % GEL] Apply 2 g topically 4 (four) times a day as needed. 2g to LUE, 4g to LLE  0     hydrocortisone (CORTAID) 1 % external cream APPLY TO THE SKIN RASH LESIONS THREE TIMES DAILY AS NEEDED       lidocaine 4 % patch [LIDOCAINE 4 % PATCH] Place 1 patch on the skin daily. Remove and discard patch with 12 hours or as directed by MD. Apply to area of back pain  0     multivitamin with minerals (THERA-M) 9 mg iron-400 mcg Tab tablet [MULTIVITAMIN WITH MINERALS (THERA-M) 9 MG IRON-400 MCG TAB TABLET] Take 1 tablet by mouth daily.            PHYSICAL EXAM     Vital signs  /45 (BP Location: Left arm, Patient Position: Sitting)   Pulse 74   Ht 1.6 m (5' 3\")   Wt 79.4 kg (175 lb)   BMI 31.00 kg/m      Weight:   175 lbs 0 oz    Patient is alert and oriented times 3 no acute distress somewhat anxious.  Vital signs were reviewed and are documented in electronic medical record.  Neck supple.  Neurologically speech was normal cranial nerves II through XII " are intact except she is hard of hearing motor strength 5/5 except right hand intrinsic 4/5.  Reflexes 1+ toes downgoing.  She has dysmetria on finger-nose testing more so on the left side.  Sensation grossly intact.  She has a wide-based gait     PERTINENT DIAGNOSTIC STUDIES     Following studies were reviewed:     CTA HEAD & NECK 12/24/2021  HEAD CT:  1.  No acute intracranial process.  2.  Age-related changes described above.  3.  Right maxillary sinus air-fluid level. Please correlate for acute sinusitis.      HEAD CTA:   Right precavernous ICA mild stenosis. Right carotid siphon moderate stenosis.     Left precavernous ICA mild stenosis. Left cavernous ICA mild stenosis. Left carotid siphon mild stenosis. Left distal carotid siphon/proximal supraclinoid ICA moderate stenosis.     Right proximal V4 segment near dural ring severe stenosis.      Left proximal V4 segment severe stenosis.     Inferior basilar artery severe stenosis secondary to calcified plaque.     Otherwise, no significant stenosis/occlusion.      NECK CTA:  Right carotid bifurcation mild to moderate stenosis. Atherosclerotic plaque results in 50-70% stenosis in the right carotid bulb.       Left distal common carotid artery moderate to severe stenosis. Right external carotid artery origin severe stenosis. Right carotid bifurcation moderate stenosis.      Right vertebral artery origin severe stenosis.     ECHOCARDIOGRAM 1/31/2022  1.Left ventricular size, wall motion and function are normal. The ejection  fraction is 60-65%.  2.There is mild concentric left ventricular hypertrophy.  3.Normal right ventricle size and systolic function.  4.No hemodynamically significant valvular abnormalities on 2D or color flow  imaging.  5.No obvious embolic source seen, however agitated saline injection looking  for PFO was not performed.  6.Compared to the prior study dated 3/28/2020, there have been no changes.    ECHOCARDIOGRAM 3/26/2020  1. Normal left  ventricular size and systolic performance with a visually estimated ejection fraction of 65-70%.   2. There is mild to moderate concentric increase in left ventricular wall thickness.   3. No significant valvular heart disease is identified on this study.   4. Normal right ventricular size and systolic performance.       30-DAY EVENT MONITOR 1/31/2022  Essentially normal multi day patient activated monitor.  Indication for study: Transient cerebral ischemic attack.  No rhythm disturbance was demonstrated which might account for such a neurologic event.  No sustained atrial or ventricular tachyarrhythmia were observed.  Mildly increased ventricular ectopy which appears to be asymptomatic.  No profound bradycardia or pauses were observed.       PERTINENT LABS  Following labs were reviewed:  Hospital Outpatient Visit on 01/31/2022   Component Date Value     LVEF  01/31/2022 60-65%          Total time spent for face to face visit, reviewing labs/imaging studies, counseling and coordination of care was: 30 Minutes spent on the date of the encounter doing chart review, review of outside records, review of test results, interpretation of tests, patient visit and documentation       This note was dictated using voice recognition software.  Any grammatical or context distortions are unintentional and inherent to the software.    No orders of the defined types were placed in this encounter.     New Prescriptions    No medications on file     Modified Medications    No medications on file

## 2022-03-29 NOTE — H&P (VIEW-ONLY)
NEUROLOGY FOLLOW UP VISIT  NOTE       [facility] NEUROLOGY [place]  [address] [place] [address]  Tel: [phone]  Fax: [phone]  [email or web address]     Sussy Cole,  1935, MRN [id number]  PCP: Rosalee Bravo  Date: 2022      ASSESSMENT & PLAN     Visit Diagnosis  1. TIA (transient ischemic attack)     Transient ischemic attack  86-year-old female with history of HTN, HLD, CKD stage III, breast cancer, B12 deficiency, vertebrobasilar insufficiency who returns for follow-up.  Since her last visit she had a 30-day event monitor and echocardiogram that was normal.  I have recommended:    1.  Switch to regular strength aspirin 325 mg daily.  Patient is quite concerned given her history of gastric ulcers in the past and reluctant to take aspirin but I have strongly encouraged her to take full-strength enteric-coated aspirin (new recommendations) but if she does not feel comfortable at least take 81 mg daily  2.  Continue Lipitor 20 mg daily  3.  Follow-up on as needed basis    Thank you again for this referral, please feel free to contact me if you have any questions.    Alfredo Lance MD  [facility]  ([facility], [facility]     HISTORY OF PRESENT ILLNESS     Patient is a 86-year-old female with history of HTN, HLD, CKD stage III, breast cancer, B12 deficiency and gout who was last seen on 2021 after she had a transient episode of speech difficulty.  This resolved spontaneously.  She had CTA of the head and neck that showed fairly extensive intracranial atherosclerotic disease with occlusion of bilateral vertebral arteries, basilar artery suggesting vertebrobasilar insufficiency.  She was started on dual antiplatelet therapy and lipid profile was checked that showed a LDL of 50.  Also echocardiogram was done that showed a normal ejection fraction with concentric left ventricular hypertrophy.  No cardiac  source of emboli noted.  30-day event monitor was normal.  Since her last visit she reports no further episodes of speech difficulty or focal weakness.    As noted above she was initially seen in [facility] emergency room on 12/24/2021 with acute onset of visual symptoms.  She was watching TV and suddenly felt lights were super bright and started hurting her eyes.  She got up and walked around and sat down and tried to talk to herself but she could not put the words together.  She tried to text her son who was in another room and had difficulty typing the words.  The whole episode lasted for about 30 to 45 minutes.  Eventually paramedics were called and her symptoms gradually improved.  She was taken to [facility] emergency room where CT of the head and CTA showed extensive intracranial atherosclerotic disease.  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     Gout M10.9     Hypertension I10     Hypothyroidism E03.9     Macrocytic anemia D53.9     Osteoporosis M81.0     Primary osteoarthritis of both knees M17.0     Pseudophakia of both eyes Z96.1     Rotator cuff tendonitis M75.80     Skin cancer C44.90     Tinnitus of both ears H93.13     Venous stasis dermatitis of both lower extremities I87.2     Obesity (BMI 35.0-39.9) with comorbidity (H) E66.01     Ulnar neuropathy of right upper extremity G56.21     Chronic kidney disease, stage 3 (H) N18.30     TIA (transient ischemic attack) G45.9     Vertebrobasilar insufficiency G45.0     Intracranial atherosclerosis I67.2         PAST MEDICAL & SURGICAL HISTORY     Past Medical History:   Patient  has a past medical history of Breast cancer (H), Closed compression fracture of L2 vertebra, initial encounter (H) (6/2/2021), Duodenal ulcer (04/2020), Duodenal ulcer due to  "nonsteroidal anti-inflammatory drug (NSAID) (4/3/2020), Gastrointestinal hemorrhage with melena (3/26/2020), History of humerus fracture (4/29/2021), HTN (hypertension), Osteoporosis, Status post shoulder surgery (3/24/2009), and Thyroid disease.    Surgical History:  She  has a past surgical history that includes IR Visceral Angiogram (3/29/2020); Midline Insertion - Double Lumen (03/29/2020); Ir Mesenteric Angiogram (03/29/2020); Pr Esophagogastroduodenoscopy Transoral Diagnostic (N/A, 03/29/2020); Lumpectomy breast; and back surgery.     SOCIAL HISTORY     Reviewed, and she  reports that she has quit smoking. She has never used smokeless tobacco. She reports that she does not drink alcohol and does not use drugs.     FAMILY HISTORY     Reviewed, and family history includes Glaucoma in her brother.     ALLERGIES     Allergies   Allergen Reactions     Hydrocodone-Acetaminophen Nausea and Vomiting     Amoxicillin Other (See Comments)     Sores in mouth, tongue slightly swollen     Hydrochlorothiazide Unknown     Levofloxacin Other (See Comments)     \"mouth gets raw\"     Morphine Nausea and Vomiting         REVIEW OF SYSTEMS     A 12 point review of system was performed and was negative except as outlined in the history of present illness.     HOME MEDICATIONS     Current Outpatient Rx   Medication Sig Dispense Refill     allopurinoL (ZYLOPRIM) 100 MG tablet [ALLOPURINOL (ZYLOPRIM) 100 MG TABLET] Take 100 mg by mouth 2 (two) times a day.        aspirin (ASA) 81 MG chewable tablet Take 1 tablet (81 mg) by mouth daily 90 tablet 0     atorvastatin (LIPITOR) 20 MG tablet Take 1 tablet (20 mg) by mouth daily 30 tablet 11     ipratropium - albuterol 0.5 mg/2.5 mg/3 mL (DUONEB) 0.5-2.5 (3) MG/3ML neb solution Inhale 3 mLs into the lungs       levothyroxine (SYNTHROID, LEVOTHROID) 112 MCG tablet [LEVOTHYROXINE (SYNTHROID, LEVOTHROID) 112 MCG TABLET] Take 112 mcg by mouth Daily at 6:00 am.        lisinopriL (PRINIVIL,ZESTRIL) " "20 MG tablet [LISINOPRIL (PRINIVIL,ZESTRIL) 20 MG TABLET] Take 20 mg by mouth every evening.        omeprazole (PRILOSEC) 20 MG capsule [OMEPRAZOLE (PRILOSEC) 20 MG CAPSULE] Take 1 capsule (20 mg total) by mouth 2 (two) times a day before meals. 60 capsule 0     senna-docusate (SENNOSIDES-DOCUSATE SODIUM) 8.6-50 mg tablet [SENNA-DOCUSATE (SENNOSIDES-DOCUSATE SODIUM) 8.6-50 MG TABLET] Take 2 tablets by mouth daily. Hold for 2 or more loose stools per 24hrs  0     acetaminophen (TYLENOL) 500 MG tablet [ACETAMINOPHEN (TYLENOL) 500 MG TABLET] Take 2 tablets (1,000 mg total) by mouth 3 (three) times a day.  0     calcium-vitamin D3-vitamin K (VIACTIV) 650 mg-12.5 mcg-40 mcg Chew [CALCIUM-VITAMIN D3-VITAMIN K (VIACTIV) 650 MG-12.5 MCG-40 MCG CHEW] Chew 1 tablet 2 (two) times a day.        clotrimazole-betamethasone (LOTRISONE) 1-0.05 % external cream APPLY TOPICALLY TO THE FEET TWICE DAILY AS NEEDED       diclofenac sodium (VOLTAREN) 1 % Gel [DICLOFENAC SODIUM (VOLTAREN) 1 % GEL] Apply 2 g topically 4 (four) times a day as needed. 2g to LUE, 4g to LLE  0     hydrocortisone (CORTAID) 1 % external cream APPLY TO THE SKIN RASH LESIONS THREE TIMES DAILY AS NEEDED       lidocaine 4 % patch [LIDOCAINE 4 % PATCH] Place 1 patch on the skin daily. Remove and discard patch with 12 hours or as directed by MD. Apply to area of back pain  0     multivitamin with minerals (THERA-M) 9 mg iron-400 mcg Tab tablet [MULTIVITAMIN WITH MINERALS (THERA-M) 9 MG IRON-400 MCG TAB TABLET] Take 1 tablet by mouth daily.            PHYSICAL EXAM     Vital signs  /45 (BP Location: Left arm, Patient Position: Sitting)   Pulse 74   Ht 1.6 m (5' 3\")   Wt 79.4 kg (175 lb)   BMI 31.00 kg/m      Weight:   175 lbs 0 oz    Patient is alert and oriented times 3 no acute distress somewhat anxious.  Vital signs were reviewed and are documented in electronic medical record.  Neck supple.  Neurologically speech was normal cranial nerves II through XII " are intact except she is hard of hearing motor strength 5/5 except right hand intrinsic 4/5.  Reflexes 1+ toes downgoing.  She has dysmetria on finger-nose testing more so on the left side.  Sensation grossly intact.  She has a wide-based gait     PERTINENT DIAGNOSTIC STUDIES     Following studies were reviewed:     CTA HEAD & NECK 12/24/2021  HEAD CT:  1.  No acute intracranial process.  2.  Age-related changes described above.  3.  Right maxillary sinus air-fluid level. Please correlate for acute sinusitis.      HEAD CTA:   Right precavernous ICA mild stenosis. Right carotid siphon moderate stenosis.     Left precavernous ICA mild stenosis. Left cavernous ICA mild stenosis. Left carotid siphon mild stenosis. Left distal carotid siphon/proximal supraclinoid ICA moderate stenosis.     Right proximal V4 segment near dural ring severe stenosis.      Left proximal V4 segment severe stenosis.     Inferior basilar artery severe stenosis secondary to calcified plaque.     Otherwise, no significant stenosis/occlusion.      NECK CTA:  Right carotid bifurcation mild to moderate stenosis. Atherosclerotic plaque results in 50-70% stenosis in the right carotid bulb.       Left distal common carotid artery moderate to severe stenosis. Right external carotid artery origin severe stenosis. Right carotid bifurcation moderate stenosis.      Right vertebral artery origin severe stenosis.     ECHOCARDIOGRAM 1/31/2022  1.Left ventricular size, wall motion and function are normal. The ejection  fraction is 60-65%.  2.There is mild concentric left ventricular hypertrophy.  3.Normal right ventricle size and systolic function.  4.No hemodynamically significant valvular abnormalities on 2D or color flow  imaging.  5.No obvious embolic source seen, however agitated saline injection looking  for PFO was not performed.  6.Compared to the prior study dated 3/28/2020, there have been no changes.    ECHOCARDIOGRAM 3/26/2020  1. Normal left  ventricular size and systolic performance with a visually estimated ejection fraction of 65-70%.   2. There is mild to moderate concentric increase in left ventricular wall thickness.   3. No significant valvular heart disease is identified on this study.   4. Normal right ventricular size and systolic performance.       30-DAY EVENT MONITOR 1/31/2022  Essentially normal multi day patient activated monitor.  Indication for study: Transient cerebral ischemic attack.  No rhythm disturbance was demonstrated which might account for such a neurologic event.  No sustained atrial or ventricular tachyarrhythmia were observed.  Mildly increased ventricular ectopy which appears to be asymptomatic.  No profound bradycardia or pauses were observed.       PERTINENT LABS  Following labs were reviewed:  Hospital Outpatient Visit on 01/31/2022   Component Date Value     LVEF  01/31/2022 60-65%          Total time spent for face to face visit, reviewing labs/imaging studies, counseling and coordination of care was: 30 Minutes spent on the date of the encounter doing chart review, review of outside records, review of test results, interpretation of tests, patient visit and documentation       This note was dictated using voice recognition software.  Any grammatical or context distortions are unintentional and inherent to the software.    No orders of the defined types were placed in this encounter.     New Prescriptions    No medications on file     Modified Medications    No medications on file

## 2022-03-29 NOTE — NURSING NOTE
Chief Complaint   Patient presents with     Tia (Transient Ischemic Attack)     Discuss labs, echo and event monitor results      Mavis Dye CMA on 3/29/2022 at 2:12 PM

## 2022-04-08 DIAGNOSIS — Z11.59 ENCOUNTER FOR SCREENING FOR OTHER VIRAL DISEASES: Primary | ICD-10-CM

## 2022-04-11 ENCOUNTER — LAB (OUTPATIENT)
Dept: FAMILY MEDICINE | Facility: CLINIC | Age: 87
End: 2022-04-11
Attending: PATHOLOGY
Payer: MEDICARE

## 2022-04-11 DIAGNOSIS — Z11.59 ENCOUNTER FOR SCREENING FOR OTHER VIRAL DISEASES: ICD-10-CM

## 2022-04-11 PROCEDURE — U0003 INFECTIOUS AGENT DETECTION BY NUCLEIC ACID (DNA OR RNA); SEVERE ACUTE RESPIRATORY SYNDROME CORONAVIRUS 2 (SARS-COV-2) (CORONAVIRUS DISEASE [COVID-19]), AMPLIFIED PROBE TECHNIQUE, MAKING USE OF HIGH THROUGHPUT TECHNOLOGIES AS DESCRIBED BY CMS-2020-01-R: HCPCS

## 2022-04-11 PROCEDURE — U0005 INFEC AGEN DETEC AMPLI PROBE: HCPCS

## 2022-04-12 LAB — SARS-COV-2 RNA RESP QL NAA+PROBE: NEGATIVE

## 2022-04-13 ENCOUNTER — TRANSFERRED RECORDS (OUTPATIENT)
Dept: HEALTH INFORMATION MANAGEMENT | Facility: CLINIC | Age: 87
End: 2022-04-13
Payer: MEDICARE

## 2022-04-13 ENCOUNTER — ANESTHESIA EVENT (OUTPATIENT)
Dept: SURGERY | Facility: CLINIC | Age: 87
End: 2022-04-13
Payer: MEDICARE

## 2022-04-14 ENCOUNTER — PROCEDURE ONLY VISIT (OUTPATIENT)
Dept: INFUSION THERAPY | Facility: CLINIC | Age: 87
End: 2022-04-14

## 2022-04-14 ENCOUNTER — ANESTHESIA (OUTPATIENT)
Dept: SURGERY | Facility: CLINIC | Age: 87
End: 2022-04-14
Payer: MEDICARE

## 2022-04-14 ENCOUNTER — HOSPITAL ENCOUNTER (OUTPATIENT)
Facility: CLINIC | Age: 87
Discharge: HOME | End: 2022-04-14
Attending: PATHOLOGY | Admitting: PATHOLOGY
Payer: MEDICARE

## 2022-04-14 VITALS
RESPIRATION RATE: 16 BRPM | OXYGEN SATURATION: 93 % | DIASTOLIC BLOOD PRESSURE: 53 MMHG | SYSTOLIC BLOOD PRESSURE: 118 MMHG | HEART RATE: 92 BPM | TEMPERATURE: 98.2 F

## 2022-04-14 DIAGNOSIS — D53.9 MACROCYTIC ANEMIA: Primary | ICD-10-CM

## 2022-04-14 LAB
BASOPHILS # BLD AUTO: 0 10E3/UL (ref 0–0.2)
BASOPHILS NFR BLD AUTO: 0 %
EOSINOPHIL # BLD AUTO: 0.1 10E3/UL (ref 0–0.7)
EOSINOPHIL NFR BLD AUTO: 2 %
ERYTHROCYTE [DISTWIDTH] IN BLOOD BY AUTOMATED COUNT: 15.5 % (ref 10–15)
HCT VFR BLD AUTO: 26.1 % (ref 35–47)
HGB BLD-MCNC: 8.7 G/DL (ref 11.7–15.7)
HOLD SPECIMEN: NORMAL
IMM GRANULOCYTES # BLD: 0 10E3/UL
IMM GRANULOCYTES NFR BLD: 0 %
LYMPHOCYTES # BLD AUTO: 0.9 10E3/UL (ref 0.8–5.3)
LYMPHOCYTES NFR BLD AUTO: 20 %
MCH RBC QN AUTO: 36.3 PG (ref 26.5–33)
MCHC RBC AUTO-ENTMCNC: 33.3 G/DL (ref 31.5–36.5)
MCV RBC AUTO: 109 FL (ref 78–100)
MONOCYTES # BLD AUTO: 0.3 10E3/UL (ref 0–1.3)
MONOCYTES NFR BLD AUTO: 6 %
NEUTROPHILS # BLD AUTO: 3.2 10E3/UL (ref 1.6–8.3)
NEUTROPHILS NFR BLD AUTO: 72 %
NRBC # BLD AUTO: 0 10E3/UL
NRBC BLD AUTO-RTO: 0 /100
PLATELET # BLD AUTO: 148 10E3/UL (ref 150–450)
RBC # BLD AUTO: 2.4 10E6/UL (ref 3.8–5.2)
WBC # BLD AUTO: 4.5 10E3/UL (ref 4–11)

## 2022-04-14 PROCEDURE — 250N000009 HC RX 250: Performed by: ANESTHESIOLOGY

## 2022-04-14 PROCEDURE — 250N000011 HC RX IP 250 OP 636: Performed by: ANESTHESIOLOGY

## 2022-04-14 PROCEDURE — 88189 FLOWCYTOMETRY/READ 16 & >: CPT | Performed by: PATHOLOGY

## 2022-04-14 PROCEDURE — 88305 TISSUE EXAM BY PATHOLOGIST: CPT | Mod: TC | Performed by: INTERNAL MEDICINE

## 2022-04-14 PROCEDURE — 88184 FLOWCYTOMETRY/ TC 1 MARKER: CPT | Performed by: PATHOLOGY

## 2022-04-14 PROCEDURE — 38222 DX BONE MARROW BX & ASPIR: CPT | Performed by: PATHOLOGY

## 2022-04-14 PROCEDURE — 85025 COMPLETE CBC W/AUTO DIFF WBC: CPT | Performed by: INTERNAL MEDICINE

## 2022-04-14 PROCEDURE — 360N000075 HC SURGERY LEVEL 2, PER MIN: Performed by: PATHOLOGY

## 2022-04-14 PROCEDURE — 88185 FLOWCYTOMETRY/TC ADD-ON: CPT | Performed by: INTERNAL MEDICINE

## 2022-04-14 PROCEDURE — 36415 COLL VENOUS BLD VENIPUNCTURE: CPT | Performed by: INTERNAL MEDICINE

## 2022-04-14 PROCEDURE — 81450 HL NEO GSAP 5-50DNA/DNA&RNA: CPT

## 2022-04-14 PROCEDURE — 999N000141 HC STATISTIC PRE-PROCEDURE NURSING ASSESSMENT: Performed by: PATHOLOGY

## 2022-04-14 PROCEDURE — 258N000003 HC RX IP 258 OP 636: Performed by: ANESTHESIOLOGY

## 2022-04-14 PROCEDURE — 250N000009 HC RX 250: Performed by: PATHOLOGY

## 2022-04-14 PROCEDURE — 710N000012 HC RECOVERY PHASE 2, PER MINUTE: Performed by: PATHOLOGY

## 2022-04-14 PROCEDURE — 84999 UNLISTED CHEMISTRY PROCEDURE: CPT

## 2022-04-14 PROCEDURE — 88237 TISSUE CULTURE BONE MARROW: CPT | Performed by: INTERNAL MEDICINE

## 2022-04-14 PROCEDURE — 370N000017 HC ANESTHESIA TECHNICAL FEE, PER MIN: Performed by: PATHOLOGY

## 2022-04-14 PROCEDURE — 88313 SPECIAL STAINS GROUP 2: CPT | Mod: TC | Performed by: INTERNAL MEDICINE

## 2022-04-14 RX ORDER — FENTANYL CITRATE 50 UG/ML
25 INJECTION, SOLUTION INTRAMUSCULAR; INTRAVENOUS EVERY 5 MIN PRN
Status: DISCONTINUED | OUTPATIENT
Start: 2022-04-14 | End: 2022-04-14 | Stop reason: HOSPADM

## 2022-04-14 RX ORDER — LIDOCAINE HYDROCHLORIDE 10 MG/ML
INJECTION, SOLUTION INFILTRATION; PERINEURAL PRN
Status: DISCONTINUED | OUTPATIENT
Start: 2022-04-14 | End: 2022-04-14

## 2022-04-14 RX ORDER — MEPERIDINE HYDROCHLORIDE 25 MG/ML
12.5 INJECTION INTRAMUSCULAR; INTRAVENOUS; SUBCUTANEOUS
Status: DISCONTINUED | OUTPATIENT
Start: 2022-04-14 | End: 2022-04-14 | Stop reason: HOSPADM

## 2022-04-14 RX ORDER — ONDANSETRON 2 MG/ML
INJECTION INTRAMUSCULAR; INTRAVENOUS PRN
Status: DISCONTINUED | OUTPATIENT
Start: 2022-04-14 | End: 2022-04-14

## 2022-04-14 RX ORDER — PROPOFOL 10 MG/ML
INJECTION, EMULSION INTRAVENOUS PRN
Status: DISCONTINUED | OUTPATIENT
Start: 2022-04-14 | End: 2022-04-14

## 2022-04-14 RX ORDER — PROPOFOL 10 MG/ML
INJECTION, EMULSION INTRAVENOUS CONTINUOUS PRN
Status: DISCONTINUED | OUTPATIENT
Start: 2022-04-14 | End: 2022-04-14

## 2022-04-14 RX ORDER — SODIUM CHLORIDE, SODIUM LACTATE, POTASSIUM CHLORIDE, CALCIUM CHLORIDE 600; 310; 30; 20 MG/100ML; MG/100ML; MG/100ML; MG/100ML
INJECTION, SOLUTION INTRAVENOUS CONTINUOUS
Status: DISCONTINUED | OUTPATIENT
Start: 2022-04-14 | End: 2022-04-14 | Stop reason: HOSPADM

## 2022-04-14 RX ORDER — FENTANYL CITRATE 50 UG/ML
INJECTION, SOLUTION INTRAMUSCULAR; INTRAVENOUS PRN
Status: DISCONTINUED | OUTPATIENT
Start: 2022-04-14 | End: 2022-04-14

## 2022-04-14 RX ORDER — HYDROMORPHONE HYDROCHLORIDE 1 MG/ML
0.5 INJECTION, SOLUTION INTRAMUSCULAR; INTRAVENOUS; SUBCUTANEOUS EVERY 5 MIN PRN
Status: DISCONTINUED | OUTPATIENT
Start: 2022-04-14 | End: 2022-04-14 | Stop reason: HOSPADM

## 2022-04-14 RX ORDER — ONDANSETRON 4 MG/1
4 TABLET, ORALLY DISINTEGRATING ORAL EVERY 30 MIN PRN
Status: DISCONTINUED | OUTPATIENT
Start: 2022-04-14 | End: 2022-04-14 | Stop reason: HOSPADM

## 2022-04-14 RX ORDER — LIDOCAINE 40 MG/G
CREAM TOPICAL
Status: DISCONTINUED | OUTPATIENT
Start: 2022-04-14 | End: 2022-04-14 | Stop reason: HOSPADM

## 2022-04-14 RX ORDER — ONDANSETRON 2 MG/ML
4 INJECTION INTRAMUSCULAR; INTRAVENOUS EVERY 30 MIN PRN
Status: DISCONTINUED | OUTPATIENT
Start: 2022-04-14 | End: 2022-04-14 | Stop reason: HOSPADM

## 2022-04-14 RX ORDER — LIDOCAINE HYDROCHLORIDE 10 MG/ML
INJECTION, SOLUTION INFILTRATION; PERINEURAL PRN
Status: DISCONTINUED | OUTPATIENT
Start: 2022-04-14 | End: 2022-04-14 | Stop reason: HOSPADM

## 2022-04-14 RX ORDER — FENTANYL CITRATE 50 UG/ML
25 INJECTION, SOLUTION INTRAMUSCULAR; INTRAVENOUS
Status: DISCONTINUED | OUTPATIENT
Start: 2022-04-14 | End: 2022-04-14 | Stop reason: HOSPADM

## 2022-04-14 RX ORDER — OXYCODONE HYDROCHLORIDE 5 MG/1
5 TABLET ORAL EVERY 4 HOURS PRN
Status: DISCONTINUED | OUTPATIENT
Start: 2022-04-14 | End: 2022-04-14 | Stop reason: HOSPADM

## 2022-04-14 RX ADMIN — ONDANSETRON 4 MG: 2 INJECTION INTRAMUSCULAR; INTRAVENOUS at 09:50

## 2022-04-14 RX ADMIN — PROPOFOL 30 MG: 10 INJECTION, EMULSION INTRAVENOUS at 09:50

## 2022-04-14 RX ADMIN — LIDOCAINE HYDROCHLORIDE 4 ML: 10 INJECTION, SOLUTION INFILTRATION; PERINEURAL at 09:50

## 2022-04-14 RX ADMIN — FENTANYL CITRATE 25 MCG: 50 INJECTION, SOLUTION INTRAMUSCULAR; INTRAVENOUS at 09:46

## 2022-04-14 RX ADMIN — SODIUM CHLORIDE, POTASSIUM CHLORIDE, SODIUM LACTATE AND CALCIUM CHLORIDE: 600; 310; 30; 20 INJECTION, SOLUTION INTRAVENOUS at 09:28

## 2022-04-14 RX ADMIN — PROPOFOL 75 MCG/KG/MIN: 10 INJECTION, EMULSION INTRAVENOUS at 09:50

## 2022-04-14 ASSESSMENT — COPD QUESTIONNAIRES: COPD: 1

## 2022-04-14 NOTE — ANESTHESIA CARE TRANSFER NOTE
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    Anesthesia Type:   MAC     Note:    Oropharynx: oropharynx clear of all foreign objects and spontaneously breathing  Level of Consciousness: awake  Oxygen Supplementation: room air    Independent Airway: airway patency satisfactory and stable  Dentition: dentition unchanged  Vital Signs Stable: post-procedure vital signs reviewed and stable  Report to RN Given: handoff report given  Patient transferred to: Phase II    Handoff Report: Identifed the Patient, Identified the Reponsible Provider, Reviewed the pertinent medical history, Discussed the surgical course, Reviewed Intra-OP anesthesia mangement and issues during anesthesia, Set expectations for post-procedure period and Allowed opportunity for questions and acknowledgement of understanding      Vitals:  Vitals Value Taken Time   /57 04/14/22 1017   Temp 36.8  C (98.2  F) 04/14/22 1017   Pulse 92 04/14/22 1019   Resp 16 04/14/22 1017   SpO2 93 % 04/14/22 1019   Vitals shown include unvalidated device data.    Electronically Signed By: JUNIE Dixon CRNA  April 14, 2022  10:20 AM

## 2022-04-14 NOTE — ANESTHESIA PREPROCEDURE EVALUATION
Anesthesia Pre-Procedure Evaluation    Patient: Sussy Cole   MRN: [id number] : 1935        Procedure : Procedure(s):  UNILATERAL BONE MARROW BIOPSY, SIDE DETERMINDED DAY OF PROCEDURE, ILIAC CREST          Past Medical History:   Diagnosis Date     Breast cancer (H)      Closed compression fracture of L2 vertebra, initial encounter (H) 2021     COPD (chronic obstructive pulmonary disease) (H)      Duodenal ulcer 2020     Duodenal ulcer due to nonsteroidal anti-inflammatory drug (NSAID) 2020    Formatting of this note might be different from the original. 3-26-20  IR embolization on 3-29-20, multiple units of blood. Admit hgb  6.1. aleve stopped. Patient had been taking aleve with prednisone.      Gastrointestinal hemorrhage with melena 2020    Added automatically from request for surgery  Formatting of this note might be different from the original. Added automatically from request for surgery       History of humerus fracture 2021     HTN (hypertension)      Osteoporosis      Status post shoulder surgery 2009  Unremarkable.  Repeat in 10 years Formatting of this note might be different from the original. 2007  Unremarkable.  Repeat in 10 years      Thyroid disease       Past Surgical History:   Procedure Laterality Date     BACK SURGERY       IR MESENTERIC ANGIOGRAM  2020     IR VISCERAL ANGIOGRAM  3/29/2020     LUMPECTOMY BREAST       MIDLINE INSERTION - DOUBLE LUMEN  2020          MN ESOPHAGOGASTRODUODENOSCOPY TRANSORAL DIAGNOSTIC N/A 2020    Procedure: ESOPHAGOGASTRODUODENOSCOPY (EGD) with Epi injection and cautery;  Surgeon: Pacheco Echeverria DO;  Location: [facility];  Service: Gastroenterology      Allergies   Allergen Reactions     Hydrocodone-Acetaminophen Nausea and Vomiting     Amoxicillin Other (See Comments)     Sores in mouth, tongue slightly swollen     Hydrochlorothiazide Unknown     Levofloxacin  "Other (See Comments)     \"mouth gets raw\"     Morphine Nausea and Vomiting      Social History     Tobacco Use     Smoking status: Former Smoker     Smokeless tobacco: Never Used   Substance Use Topics     Alcohol use: No      Wt Readings from Last 1 Encounters:   03/29/22 79.4 kg (175 lb)        Anesthesia Evaluation   Pt has had prior anesthetic.     No history of anesthetic complications       ROS/MED HX  ENT/Pulmonary:     (+) COPD,     Neurologic:       Cardiovascular:     (+) hypertension-----    METS/Exercise Tolerance:     Hematologic:       Musculoskeletal:       GI/Hepatic:       Renal/Genitourinary:     (+) renal disease,     Endo:     (+) thyroid problem, Obesity,     Psychiatric/Substance Use:       Infectious Disease:       Malignancy:       Other:            Physical Exam    Airway        Mallampati: II    Neck ROM: full     Respiratory Devices and Support         Dental  no notable dental history         Cardiovascular   cardiovascular exam normal          Pulmonary   pulmonary exam normal                OUTSIDE LABS:  CBC:   Lab Results   Component Value Date    WBC 4.5 04/14/2022    WBC 5.5 12/24/2021    HGB 8.7 (L) 04/14/2022    HGB 10.2 (L) 12/24/2021    HCT 26.1 (L) 04/14/2022    HCT 31.6 (L) 12/24/2021     (L) 04/14/2022     (L) 12/24/2021     BMP:   Lab Results   Component Value Date     12/24/2021     06/06/2021    POTASSIUM 4.7 12/24/2021    POTASSIUM 4.9 06/06/2021    CHLORIDE 108 (H) 12/24/2021    CHLORIDE 105 06/06/2021    CO2 24 12/24/2021    CO2 26 06/06/2021    BUN 41 (H) 12/24/2021    BUN 23 06/06/2021    CR 1.68 (H) 12/24/2021    CR 1.07 06/06/2021     12/24/2021     06/06/2021     COAGS:   Lab Results   Component Value Date    PTT 37 03/29/2020    INR 0.95 12/24/2021    FIBR 242 03/29/2020     POC: No results found for: BGM, HCG, HCGS  HEPATIC:   Lab Results   Component Value Date    ALBUMIN 3.7 06/02/2021    PROTTOTAL 6.2 06/02/2021    ALT 11 " 06/02/2021    AST 13 06/02/2021    ALKPHOS 97 06/02/2021    BILITOTAL 1.1 (H) 06/02/2021     OTHER:   Lab Results   Component Value Date    LACT 0.8 03/29/2020    CARMELA 9.5 12/24/2021    PHOS 3.7 03/26/2020    MAG 2.5 05/03/2021    LIPASE <9 06/02/2021    TSH 1.20 03/26/2020    CRP <0.1 12/24/2021       Anesthesia Plan    ASA Status:  2      Anesthesia Type: MAC.              Consents    Anesthesia Plan(s) and associated risks, benefits, and realistic alternatives discussed. Questions answered and patient/representative(s) expressed understanding.     - Discussed: Risks, Benefits and Alternatives for the PROCEDURE were discussed     - Discussed with:  Patient         Postoperative Care    Pain management: Multi-modal analgesia.        Comments:                Isabel Workman MD

## 2022-04-14 NOTE — PROCEDURES
BONE MARROW BIOPSY AND ASPIRATE PROCESDURE NOTE    The procedure is discussed with the patient.    40 ml 1% lidocaine is administered.    Right PIC bone marrow biopsy and aspirate were performed.    Less than 5 ml blood loss.   The patient tolerated the procedure well.    Specimens submitted for testing.

## 2022-04-14 NOTE — ANESTHESIA POSTPROCEDURE EVALUATION
Patient: Sussy Cole    Procedure: Procedure(s):  UNILATERAL BONE MARROW BIOPSY, SIDE DETERMINDED DAY OF PROCEDURE, ILIAC CREST       Anesthesia Type:  MAC    Note:  Disposition: Outpatient   Postop Pain Control: Uneventful            Sign Out: Well controlled pain   PONV: No   Neuro/Psych: Uneventful            Sign Out: Acceptable/Baseline neuro status   Airway/Respiratory: Uneventful            Sign Out: Acceptable/Baseline resp. status   CV/Hemodynamics: Uneventful            Sign Out: Acceptable CV status; No obvious hypovolemia; No obvious fluid overload   Other NRE: NONE   DID A NON-ROUTINE EVENT OCCUR? No           Last vitals:  Vitals Value Taken Time   /53 04/14/22 1030   Temp 36.8  C (98.2  F) 04/14/22 1030   Pulse 88 04/14/22 1043   Resp 16 04/14/22 1017   SpO2 93 % 04/14/22 1043   Vitals shown include unvalidated device data.    Electronically Signed By: Isabel Workman MD  April 14, 2022  3:11 PM

## 2022-04-15 LAB
PATH REPORT.COMMENTS IMP SPEC: NORMAL
PATH REPORT.FINAL DX SPEC: NORMAL
PATH REPORT.MICROSCOPIC SPEC OTHER STN: NORMAL
PATH REPORT.RELEVANT HX SPEC: NORMAL

## 2022-04-21 LAB
CULTURE HARVEST COMPLETE DATE: NORMAL
CULTURE HARVEST COMPLETE DATE: NORMAL

## 2022-04-22 LAB
INTERPRETATION: NORMAL
ISCN: NORMAL
METHODS: NORMAL

## 2022-04-22 PROCEDURE — 88291 CYTO/MOLECULAR REPORT: CPT | Performed by: MEDICAL GENETICS

## 2022-04-23 LAB — CULTURE HARVEST COMPLETE DATE: NORMAL

## 2022-05-05 LAB — SPECIMEN STATUS: NORMAL

## 2022-05-16 LAB
PATH REPORT.ADDENDUM SPEC: NORMAL
PATH REPORT.COMMENTS IMP SPEC: NORMAL
PATH REPORT.FINAL DX SPEC: NORMAL
PATH REPORT.MICROSCOPIC SPEC OTHER STN: NORMAL
PATH REPORT.RELEVANT HX SPEC: NORMAL

## 2022-05-16 PROCEDURE — 88341 IMHCHEM/IMCYTCHM EA ADD ANTB: CPT | Mod: 26 | Performed by: PATHOLOGY

## 2022-05-16 PROCEDURE — 88342 IMHCHEM/IMCYTCHM 1ST ANTB: CPT | Mod: 26 | Performed by: PATHOLOGY

## 2022-05-16 PROCEDURE — 88311 DECALCIFY TISSUE: CPT | Mod: 26 | Performed by: PATHOLOGY

## 2022-05-16 PROCEDURE — 88313 SPECIAL STAINS GROUP 2: CPT | Mod: 26 | Performed by: PATHOLOGY

## 2022-05-16 PROCEDURE — 85097 BONE MARROW INTERPRETATION: CPT | Performed by: PATHOLOGY

## 2022-05-16 PROCEDURE — 88305 TISSUE EXAM BY PATHOLOGIST: CPT | Mod: 26 | Performed by: PATHOLOGY

## 2022-05-16 PROCEDURE — 99207 BONE MARROW BIOPSY: CPT | Performed by: PATHOLOGY

## 2022-10-23 DIAGNOSIS — G45.9 TIA (TRANSIENT ISCHEMIC ATTACK): ICD-10-CM

## 2022-10-23 DIAGNOSIS — I67.2 INTRACRANIAL ATHEROSCLEROSIS: ICD-10-CM

## 2022-10-23 DIAGNOSIS — G45.0 VERTEBROBASILAR INSUFFICIENCY: ICD-10-CM

## 2022-10-24 RX ORDER — ATORVASTATIN CALCIUM 20 MG/1
20 TABLET, FILM COATED ORAL DAILY
Qty: 90 TABLET | Refills: 0 | Status: SHIPPED | OUTPATIENT
Start: 2022-10-24

## 2022-10-24 NOTE — TELEPHONE ENCOUNTER
Signed Prescriptions:                        Disp   Refills    atorvastatin (LIPITOR) 20 MG tablet        90 tab*0        Sig: TAKE 1 TABLET (20 MG) BY MOUTH DAILY  Authorizing Provider: RICHARD TURNER  Ordering User: CHRISTOPHER FRASER    90 day supply refilled. Included note to pharmacy asking that future refill requests to go PCP Rosalee luevano) as pt was asked to follow up here as needed ongoing.     Christopher Fraser RN, BSN  [facility]

## 2022-10-24 NOTE — TELEPHONE ENCOUNTER
"Refill request for atorvastatin 20 mg  Last follow-up 3/29/22; next follow-up unscheduled. Last office note stated \"follow-up on an as needed basis\"   Medication T'd for review and signature  Stiven Hebert, SEEMA ATC on 10/24/2022 at 9:07 AM    atorvastatin (LIPITOR) 20 MG tablet 30 tablet 11 12/29/2021  --   Sig - Route: Take 1 tablet (20 mg) by mouth daily - Oral       "

## 2024-02-20 ENCOUNTER — HOSPITAL ENCOUNTER (OUTPATIENT)
Facility: HOSPITAL | Age: 89
Setting detail: OBSERVATION
Discharge: HOME | End: 2024-02-23
Attending: EMERGENCY MEDICINE | Admitting: HOSPITALIST
Payer: MEDICARE

## 2024-02-20 ENCOUNTER — APPOINTMENT (OUTPATIENT)
Dept: RADIOLOGY | Facility: HOSPITAL | Age: 89
End: 2024-02-20
Attending: EMERGENCY MEDICINE
Payer: MEDICARE

## 2024-02-20 ENCOUNTER — APPOINTMENT (OUTPATIENT)
Dept: CT IMAGING | Facility: HOSPITAL | Age: 89
End: 2024-02-20
Attending: EMERGENCY MEDICINE
Payer: MEDICARE

## 2024-02-20 DIAGNOSIS — C50.919 MALIGNANT NEOPLASM OF FEMALE BREAST, UNSPECIFIED ESTROGEN RECEPTOR STATUS, UNSPECIFIED LATERALITY, UNSPECIFIED SITE OF BREAST (H): Primary | ICD-10-CM

## 2024-02-20 DIAGNOSIS — S80.01XA CONTUSION OF RIGHT KNEE, INITIAL ENCOUNTER: ICD-10-CM

## 2024-02-20 PROBLEM — N18.9 RENAL FAILURE (ARF), ACUTE ON CHRONIC (H): Status: ACTIVE | Noted: 2024-02-20

## 2024-02-20 PROBLEM — E87.5 HYPERKALEMIA: Status: ACTIVE | Noted: 2024-02-20

## 2024-02-20 PROBLEM — N17.9 RENAL FAILURE (ARF), ACUTE ON CHRONIC (H): Status: ACTIVE | Noted: 2024-02-20

## 2024-02-20 LAB
ANION GAP SERPL CALCULATED.3IONS-SCNC: 11 MMOL/L (ref 7–15)
ATRIAL RATE - MUSE: 103 BPM
BASOPHILS # BLD AUTO: 0 10E3/UL (ref 0–0.2)
BASOPHILS NFR BLD AUTO: 1 %
BUN SERPL-MCNC: 34.1 MG/DL (ref 8–23)
CALCIUM SERPL-MCNC: 9.2 MG/DL (ref 8.8–10.2)
CHLORIDE SERPL-SCNC: 106 MMOL/L (ref 98–107)
CREAT SERPL-MCNC: 1.6 MG/DL (ref 0.51–0.95)
DEPRECATED HCO3 PLAS-SCNC: 22 MMOL/L (ref 22–29)
DIASTOLIC BLOOD PRESSURE - MUSE: 84 MMHG
EGFRCR SERPLBLD CKD-EPI 2021: 31 ML/MIN/1.73M2
EOSINOPHIL # BLD AUTO: 0.1 10E3/UL (ref 0–0.7)
EOSINOPHIL NFR BLD AUTO: 1 %
ERYTHROCYTE [DISTWIDTH] IN BLOOD BY AUTOMATED COUNT: 15.5 % (ref 10–15)
GLUCOSE SERPL-MCNC: 105 MG/DL (ref 70–99)
HCT VFR BLD AUTO: 27 % (ref 35–47)
HGB BLD-MCNC: 8.7 G/DL (ref 11.7–15.7)
HOLD SPECIMEN: NORMAL
HOLD SPECIMEN: NORMAL
IMM GRANULOCYTES # BLD: 0 10E3/UL
IMM GRANULOCYTES NFR BLD: 0 %
INTERPRETATION ECG - MUSE: NORMAL
LYMPHOCYTES # BLD AUTO: 1 10E3/UL (ref 0.8–5.3)
LYMPHOCYTES NFR BLD AUTO: 15 %
MCH RBC QN AUTO: 35.2 PG (ref 26.5–33)
MCHC RBC AUTO-ENTMCNC: 32.2 G/DL (ref 31.5–36.5)
MCV RBC AUTO: 109 FL (ref 78–100)
MONOCYTES # BLD AUTO: 0.5 10E3/UL (ref 0–1.3)
MONOCYTES NFR BLD AUTO: 8 %
NEUTROPHILS # BLD AUTO: 4.8 10E3/UL (ref 1.6–8.3)
NEUTROPHILS NFR BLD AUTO: 75 %
NRBC # BLD AUTO: 0 10E3/UL
NRBC BLD AUTO-RTO: 0 /100
P AXIS - MUSE: 77 DEGREES
PLATELET # BLD AUTO: 152 10E3/UL (ref 150–450)
POTASSIUM SERPL-SCNC: 6.3 MMOL/L (ref 3.4–5.3)
PR INTERVAL - MUSE: 152 MS
QRS DURATION - MUSE: 86 MS
QT - MUSE: 322 MS
QTC - MUSE: 421 MS
R AXIS - MUSE: 59 DEGREES
RBC # BLD AUTO: 2.47 10E6/UL (ref 3.8–5.2)
SODIUM SERPL-SCNC: 139 MMOL/L (ref 135–145)
SYSTOLIC BLOOD PRESSURE - MUSE: 149 MMHG
T AXIS - MUSE: 56 DEGREES
VENTRICULAR RATE- MUSE: 103 BPM
WBC # BLD AUTO: 6.4 10E3/UL (ref 4–11)

## 2024-02-20 PROCEDURE — 93005 ELECTROCARDIOGRAM TRACING: CPT | Performed by: EMERGENCY MEDICINE

## 2024-02-20 PROCEDURE — 99285 EMERGENCY DEPT VISIT HI MDM: CPT | Mod: 25

## 2024-02-20 PROCEDURE — 120N000001 HC R&B MED SURG/OB

## 2024-02-20 PROCEDURE — 250N000011 HC RX IP 250 OP 636: Performed by: EMERGENCY MEDICINE

## 2024-02-20 PROCEDURE — G0378 HOSPITAL OBSERVATION PER HR: HCPCS

## 2024-02-20 PROCEDURE — 99223 1ST HOSP IP/OBS HIGH 75: CPT | Performed by: INTERNAL MEDICINE

## 2024-02-20 PROCEDURE — 73560 X-RAY EXAM OF KNEE 1 OR 2: CPT | Mod: RT

## 2024-02-20 PROCEDURE — 73502 X-RAY EXAM HIP UNI 2-3 VIEWS: CPT

## 2024-02-20 PROCEDURE — 250N000013 HC RX MED GY IP 250 OP 250 PS 637: Performed by: INTERNAL MEDICINE

## 2024-02-20 PROCEDURE — 93005 ELECTROCARDIOGRAM TRACING: CPT | Performed by: INTERNAL MEDICINE

## 2024-02-20 PROCEDURE — 70450 CT HEAD/BRAIN W/O DYE: CPT | Mod: ME

## 2024-02-20 PROCEDURE — 99418 PROLNG IP/OBS E/M EA 15 MIN: CPT | Performed by: INTERNAL MEDICINE

## 2024-02-20 PROCEDURE — 36415 COLL VENOUS BLD VENIPUNCTURE: CPT | Performed by: EMERGENCY MEDICINE

## 2024-02-20 PROCEDURE — 70486 CT MAXILLOFACIAL W/O DYE: CPT | Mod: MG

## 2024-02-20 PROCEDURE — 80048 BASIC METABOLIC PNL TOTAL CA: CPT | Performed by: EMERGENCY MEDICINE

## 2024-02-20 PROCEDURE — 85025 COMPLETE CBC W/AUTO DIFF WBC: CPT | Performed by: EMERGENCY MEDICINE

## 2024-02-20 PROCEDURE — 72125 CT NECK SPINE W/O DYE: CPT | Mod: ME

## 2024-02-20 PROCEDURE — 96374 THER/PROPH/DIAG INJ IV PUSH: CPT

## 2024-02-20 RX ORDER — SODIUM CHLORIDE, SODIUM LACTATE, POTASSIUM CHLORIDE, CALCIUM CHLORIDE 600; 310; 30; 20 MG/100ML; MG/100ML; MG/100ML; MG/100ML
INJECTION, SOLUTION INTRAVENOUS CONTINUOUS
Status: DISCONTINUED | OUTPATIENT
Start: 2024-02-20 | End: 2024-02-21

## 2024-02-20 RX ORDER — AMOXICILLIN 250 MG
1 CAPSULE ORAL 2 TIMES DAILY PRN
Status: DISCONTINUED | OUTPATIENT
Start: 2024-02-20 | End: 2024-02-23 | Stop reason: HOSPADM

## 2024-02-20 RX ORDER — ONDANSETRON 4 MG/1
4 TABLET, ORALLY DISINTEGRATING ORAL EVERY 6 HOURS PRN
Status: DISCONTINUED | OUTPATIENT
Start: 2024-02-20 | End: 2024-02-23 | Stop reason: HOSPADM

## 2024-02-20 RX ORDER — OXYCODONE HYDROCHLORIDE 5 MG/1
5 TABLET ORAL ONCE
Status: COMPLETED | OUTPATIENT
Start: 2024-02-20 | End: 2024-02-20

## 2024-02-20 RX ORDER — HYDROCODONE BITARTRATE AND ACETAMINOPHEN 5; 325 MG/1; MG/1
1 TABLET ORAL EVERY 6 HOURS PRN
Status: DISCONTINUED | OUTPATIENT
Start: 2024-02-20 | End: 2024-02-21

## 2024-02-20 RX ORDER — FENTANYL CITRATE 50 UG/ML
25 INJECTION, SOLUTION INTRAMUSCULAR; INTRAVENOUS ONCE
Status: COMPLETED | OUTPATIENT
Start: 2024-02-20 | End: 2024-02-20

## 2024-02-20 RX ORDER — NICOTINE POLACRILEX 4 MG
15-30 LOZENGE BUCCAL
Status: DISCONTINUED | OUTPATIENT
Start: 2024-02-20 | End: 2024-02-23 | Stop reason: HOSPADM

## 2024-02-20 RX ORDER — DEXTROSE MONOHYDRATE 25 G/50ML
50 INJECTION, SOLUTION INTRAVENOUS ONCE
Status: DISCONTINUED | OUTPATIENT
Start: 2024-02-20 | End: 2024-02-21

## 2024-02-20 RX ORDER — CALCIUM GLUCONATE 94 MG/ML
1 INJECTION, SOLUTION INTRAVENOUS ONCE
Status: COMPLETED | OUTPATIENT
Start: 2024-02-20 | End: 2024-02-21

## 2024-02-20 RX ORDER — ONDANSETRON 2 MG/ML
4 INJECTION INTRAMUSCULAR; INTRAVENOUS EVERY 6 HOURS PRN
Status: DISCONTINUED | OUTPATIENT
Start: 2024-02-20 | End: 2024-02-23 | Stop reason: HOSPADM

## 2024-02-20 RX ORDER — DEXTROSE MONOHYDRATE 25 G/50ML
25-50 INJECTION, SOLUTION INTRAVENOUS
Status: DISCONTINUED | OUTPATIENT
Start: 2024-02-20 | End: 2024-02-21

## 2024-02-20 RX ORDER — NICOTINE POLACRILEX 4 MG
15-30 LOZENGE BUCCAL
Status: DISCONTINUED | OUTPATIENT
Start: 2024-02-20 | End: 2024-02-21

## 2024-02-20 RX ORDER — SODIUM POLYSTYRENE SULFONATE 15 G/60ML
30 SUSPENSION ORAL; RECTAL ONCE
Status: COMPLETED | OUTPATIENT
Start: 2024-02-21 | End: 2024-02-21

## 2024-02-20 RX ORDER — DEXTROSE MONOHYDRATE 25 G/50ML
25-50 INJECTION, SOLUTION INTRAVENOUS
Status: DISCONTINUED | OUTPATIENT
Start: 2024-02-20 | End: 2024-02-23 | Stop reason: HOSPADM

## 2024-02-20 RX ORDER — IPRATROPIUM BROMIDE AND ALBUTEROL SULFATE 2.5; .5 MG/3ML; MG/3ML
1 SOLUTION RESPIRATORY (INHALATION) 2 TIMES DAILY
COMMUNITY
Start: 2023-11-01

## 2024-02-20 RX ORDER — AMOXICILLIN 250 MG
2 CAPSULE ORAL 2 TIMES DAILY PRN
Status: DISCONTINUED | OUTPATIENT
Start: 2024-02-20 | End: 2024-02-23 | Stop reason: HOSPADM

## 2024-02-20 RX ORDER — ALBUTEROL SULFATE 5 MG/ML
10 SOLUTION RESPIRATORY (INHALATION) ONCE
Status: COMPLETED | OUTPATIENT
Start: 2024-02-21 | End: 2024-02-21

## 2024-02-20 RX ORDER — DEXTROSE MONOHYDRATE 25 G/50ML
25 INJECTION, SOLUTION INTRAVENOUS ONCE
Status: COMPLETED | OUTPATIENT
Start: 2024-02-21 | End: 2024-02-21

## 2024-02-20 RX ORDER — ALBUTEROL SULFATE 0.83 MG/ML
2.5 SOLUTION RESPIRATORY (INHALATION)
Status: DISCONTINUED | OUTPATIENT
Start: 2024-02-21 | End: 2024-02-20

## 2024-02-20 RX ADMIN — FENTANYL CITRATE 25 MCG: 50 INJECTION INTRAMUSCULAR; INTRAVENOUS at 20:27

## 2024-02-20 RX ADMIN — HYDROCODONE BITARTRATE AND ACETAMINOPHEN 1 TABLET: 5; 325 TABLET ORAL at 22:57

## 2024-02-20 ASSESSMENT — ACTIVITIES OF DAILY LIVING (ADL)
ADLS_ACUITY_SCORE: 37
ADLS_ACUITY_SCORE: 37

## 2024-02-21 ENCOUNTER — APPOINTMENT (OUTPATIENT)
Dept: OCCUPATIONAL THERAPY | Facility: HOSPITAL | Age: 89
End: 2024-02-21
Attending: INTERNAL MEDICINE
Payer: MEDICARE

## 2024-02-21 ENCOUNTER — APPOINTMENT (OUTPATIENT)
Dept: PHYSICAL THERAPY | Facility: HOSPITAL | Age: 89
End: 2024-02-21
Attending: INTERNAL MEDICINE
Payer: MEDICARE

## 2024-02-21 LAB
ANION GAP SERPL CALCULATED.3IONS-SCNC: 8 MMOL/L (ref 7–15)
ATRIAL RATE - MUSE: 97 BPM
BASOPHILS # BLD AUTO: 0 10E3/UL (ref 0–0.2)
BASOPHILS NFR BLD AUTO: 0 %
BUN SERPL-MCNC: 32 MG/DL (ref 8–23)
CALCIUM SERPL-MCNC: 8.9 MG/DL (ref 8.8–10.2)
CHLORIDE SERPL-SCNC: 109 MMOL/L (ref 98–107)
CREAT SERPL-MCNC: 1.14 MG/DL (ref 0.51–0.95)
DEPRECATED HCO3 PLAS-SCNC: 26 MMOL/L (ref 22–29)
DIASTOLIC BLOOD PRESSURE - MUSE: NORMAL MMHG
EGFRCR SERPLBLD CKD-EPI 2021: 47 ML/MIN/1.73M2
EOSINOPHIL # BLD AUTO: 0 10E3/UL (ref 0–0.7)
EOSINOPHIL NFR BLD AUTO: 1 %
ERYTHROCYTE [DISTWIDTH] IN BLOOD BY AUTOMATED COUNT: 15.3 % (ref 10–15)
GLUCOSE BLDC GLUCOMTR-MCNC: 102 MG/DL (ref 70–99)
GLUCOSE BLDC GLUCOMTR-MCNC: 104 MG/DL (ref 70–99)
GLUCOSE BLDC GLUCOMTR-MCNC: 99 MG/DL (ref 70–99)
GLUCOSE SERPL-MCNC: 95 MG/DL (ref 70–99)
HCT VFR BLD AUTO: 22.9 % (ref 35–47)
HGB BLD-MCNC: 7.4 G/DL (ref 11.7–15.7)
IMM GRANULOCYTES # BLD: 0 10E3/UL
IMM GRANULOCYTES NFR BLD: 0 %
INTERPRETATION ECG - MUSE: NORMAL
LYMPHOCYTES # BLD AUTO: 1.1 10E3/UL (ref 0.8–5.3)
LYMPHOCYTES NFR BLD AUTO: 22 %
MCH RBC QN AUTO: 35.4 PG (ref 26.5–33)
MCHC RBC AUTO-ENTMCNC: 32.3 G/DL (ref 31.5–36.5)
MCV RBC AUTO: 110 FL (ref 78–100)
MONOCYTES # BLD AUTO: 0.4 10E3/UL (ref 0–1.3)
MONOCYTES NFR BLD AUTO: 8 %
NEUTROPHILS # BLD AUTO: 3.5 10E3/UL (ref 1.6–8.3)
NEUTROPHILS NFR BLD AUTO: 69 %
NRBC # BLD AUTO: 0 10E3/UL
NRBC BLD AUTO-RTO: 0 /100
P AXIS - MUSE: 66 DEGREES
PLATELET # BLD AUTO: 134 10E3/UL (ref 150–450)
POTASSIUM SERPL-SCNC: 4.6 MMOL/L (ref 3.4–5.3)
POTASSIUM SERPL-SCNC: 5 MMOL/L (ref 3.4–5.3)
POTASSIUM SERPL-SCNC: 5.8 MMOL/L (ref 3.4–5.3)
PR INTERVAL - MUSE: 154 MS
QRS DURATION - MUSE: 82 MS
QT - MUSE: 338 MS
QTC - MUSE: 429 MS
R AXIS - MUSE: 17 DEGREES
RBC # BLD AUTO: 2.09 10E6/UL (ref 3.8–5.2)
SODIUM SERPL-SCNC: 143 MMOL/L (ref 135–145)
SYSTOLIC BLOOD PRESSURE - MUSE: NORMAL MMHG
T AXIS - MUSE: 45 DEGREES
TROPONIN T SERPL HS-MCNC: 29 NG/L
TROPONIN T SERPL HS-MCNC: 36 NG/L
VENTRICULAR RATE- MUSE: 97 BPM
WBC # BLD AUTO: 5 10E3/UL (ref 4–11)

## 2024-02-21 PROCEDURE — 99232 SBSQ HOSP IP/OBS MODERATE 35: CPT | Performed by: STUDENT IN AN ORGANIZED HEALTH CARE EDUCATION/TRAINING PROGRAM

## 2024-02-21 PROCEDURE — 84132 ASSAY OF SERUM POTASSIUM: CPT | Performed by: STUDENT IN AN ORGANIZED HEALTH CARE EDUCATION/TRAINING PROGRAM

## 2024-02-21 PROCEDURE — 250N000012 HC RX MED GY IP 250 OP 636 PS 637: Performed by: INTERNAL MEDICINE

## 2024-02-21 PROCEDURE — 97535 SELF CARE MNGMENT TRAINING: CPT | Mod: GO

## 2024-02-21 PROCEDURE — G0378 HOSPITAL OBSERVATION PER HR: HCPCS

## 2024-02-21 PROCEDURE — 96375 TX/PRO/DX INJ NEW DRUG ADDON: CPT

## 2024-02-21 PROCEDURE — 97116 GAIT TRAINING THERAPY: CPT | Mod: GP

## 2024-02-21 PROCEDURE — 96365 THER/PROPH/DIAG IV INF INIT: CPT

## 2024-02-21 PROCEDURE — 84484 ASSAY OF TROPONIN QUANT: CPT | Mod: 91 | Performed by: INTERNAL MEDICINE

## 2024-02-21 PROCEDURE — 96366 THER/PROPH/DIAG IV INF ADDON: CPT

## 2024-02-21 PROCEDURE — 97166 OT EVAL MOD COMPLEX 45 MIN: CPT | Mod: GO

## 2024-02-21 PROCEDURE — 250N000009 HC RX 250: Performed by: INTERNAL MEDICINE

## 2024-02-21 PROCEDURE — 82962 GLUCOSE BLOOD TEST: CPT

## 2024-02-21 PROCEDURE — 250N000013 HC RX MED GY IP 250 OP 250 PS 637: Performed by: STUDENT IN AN ORGANIZED HEALTH CARE EDUCATION/TRAINING PROGRAM

## 2024-02-21 PROCEDURE — 85025 COMPLETE CBC W/AUTO DIFF WBC: CPT | Performed by: INTERNAL MEDICINE

## 2024-02-21 PROCEDURE — 94640 AIRWAY INHALATION TREATMENT: CPT | Mod: 76

## 2024-02-21 PROCEDURE — 96361 HYDRATE IV INFUSION ADD-ON: CPT

## 2024-02-21 PROCEDURE — 258N000003 HC RX IP 258 OP 636: Performed by: INTERNAL MEDICINE

## 2024-02-21 PROCEDURE — 250N000011 HC RX IP 250 OP 636: Performed by: INTERNAL MEDICINE

## 2024-02-21 PROCEDURE — 84484 ASSAY OF TROPONIN QUANT: CPT | Performed by: INTERNAL MEDICINE

## 2024-02-21 PROCEDURE — 80048 BASIC METABOLIC PNL TOTAL CA: CPT | Performed by: INTERNAL MEDICINE

## 2024-02-21 PROCEDURE — 97161 PT EVAL LOW COMPLEX 20 MIN: CPT | Mod: GP

## 2024-02-21 PROCEDURE — 258N000001 HC RX 258: Performed by: INTERNAL MEDICINE

## 2024-02-21 PROCEDURE — 250N000013 HC RX MED GY IP 250 OP 250 PS 637: Performed by: INTERNAL MEDICINE

## 2024-02-21 PROCEDURE — 999N000157 HC STATISTIC RCP TIME EA 10 MIN

## 2024-02-21 PROCEDURE — 36415 COLL VENOUS BLD VENIPUNCTURE: CPT | Performed by: INTERNAL MEDICINE

## 2024-02-21 RX ORDER — NALOXONE HYDROCHLORIDE 0.4 MG/ML
0.4 INJECTION, SOLUTION INTRAMUSCULAR; INTRAVENOUS; SUBCUTANEOUS
Status: DISCONTINUED | OUTPATIENT
Start: 2024-02-21 | End: 2024-02-23 | Stop reason: HOSPADM

## 2024-02-21 RX ORDER — OXYCODONE HYDROCHLORIDE 5 MG/1
5 TABLET ORAL EVERY 4 HOURS PRN
Status: DISCONTINUED | OUTPATIENT
Start: 2024-02-21 | End: 2024-02-23 | Stop reason: HOSPADM

## 2024-02-21 RX ORDER — ATORVASTATIN CALCIUM 10 MG/1
20 TABLET, FILM COATED ORAL DAILY
Status: DISCONTINUED | OUTPATIENT
Start: 2024-02-21 | End: 2024-02-23 | Stop reason: HOSPADM

## 2024-02-21 RX ORDER — NALOXONE HYDROCHLORIDE 0.4 MG/ML
0.2 INJECTION, SOLUTION INTRAMUSCULAR; INTRAVENOUS; SUBCUTANEOUS
Status: DISCONTINUED | OUTPATIENT
Start: 2024-02-21 | End: 2024-02-23 | Stop reason: HOSPADM

## 2024-02-21 RX ORDER — MULTIPLE VITAMINS W/ MINERALS TAB 9MG-400MCG
1 TAB ORAL DAILY
Status: DISCONTINUED | OUTPATIENT
Start: 2024-02-21 | End: 2024-02-23 | Stop reason: HOSPADM

## 2024-02-21 RX ORDER — BENZOCAINE/MENTHOL 6 MG-10 MG
LOZENGE MUCOUS MEMBRANE 3 TIMES DAILY PRN
Status: DISCONTINUED | OUTPATIENT
Start: 2024-02-21 | End: 2024-02-23 | Stop reason: HOSPADM

## 2024-02-21 RX ORDER — CLOTRIMAZOLE AND BETAMETHASONE DIPROPIONATE 10; .64 MG/G; MG/G
CREAM TOPICAL 2 TIMES DAILY PRN
Status: DISCONTINUED | OUTPATIENT
Start: 2024-02-21 | End: 2024-02-23 | Stop reason: HOSPADM

## 2024-02-21 RX ORDER — ASPIRIN 81 MG/1
81 TABLET, CHEWABLE ORAL DAILY
Status: DISCONTINUED | OUTPATIENT
Start: 2024-02-21 | End: 2024-02-23 | Stop reason: HOSPADM

## 2024-02-21 RX ORDER — AMOXICILLIN 250 MG
2 CAPSULE ORAL DAILY
Status: DISCONTINUED | OUTPATIENT
Start: 2024-02-21 | End: 2024-02-23 | Stop reason: HOSPADM

## 2024-02-21 RX ORDER — LEVOTHYROXINE SODIUM 112 UG/1
112 TABLET ORAL DAILY
Status: DISCONTINUED | OUTPATIENT
Start: 2024-02-21 | End: 2024-02-23 | Stop reason: HOSPADM

## 2024-02-21 RX ORDER — PANTOPRAZOLE SODIUM 40 MG/1
40 TABLET, DELAYED RELEASE ORAL
Status: DISCONTINUED | OUTPATIENT
Start: 2024-02-21 | End: 2024-02-23 | Stop reason: HOSPADM

## 2024-02-21 RX ORDER — ALLOPURINOL 100 MG/1
100 TABLET ORAL 2 TIMES DAILY
Status: DISCONTINUED | OUTPATIENT
Start: 2024-02-21 | End: 2024-02-23 | Stop reason: HOSPADM

## 2024-02-21 RX ORDER — ACETAMINOPHEN 650 MG/1
650 SUPPOSITORY RECTAL EVERY 4 HOURS PRN
Status: DISCONTINUED | OUTPATIENT
Start: 2024-02-21 | End: 2024-02-23 | Stop reason: HOSPADM

## 2024-02-21 RX ORDER — IPRATROPIUM BROMIDE AND ALBUTEROL SULFATE 2.5; .5 MG/3ML; MG/3ML
1 SOLUTION RESPIRATORY (INHALATION) 2 TIMES DAILY
Status: DISCONTINUED | OUTPATIENT
Start: 2024-02-21 | End: 2024-02-23 | Stop reason: HOSPADM

## 2024-02-21 RX ORDER — ACETAMINOPHEN 325 MG/1
650 TABLET ORAL EVERY 4 HOURS PRN
Status: DISCONTINUED | OUTPATIENT
Start: 2024-02-21 | End: 2024-02-23 | Stop reason: HOSPADM

## 2024-02-21 RX ADMIN — OXYCODONE HYDROCHLORIDE 5 MG: 5 TABLET ORAL at 09:24

## 2024-02-21 RX ADMIN — ALBUTEROL SULFATE 10 MG: 2.5 SOLUTION RESPIRATORY (INHALATION) at 03:01

## 2024-02-21 RX ADMIN — ONDANSETRON 4 MG: 4 TABLET, ORALLY DISINTEGRATING ORAL at 22:48

## 2024-02-21 RX ADMIN — SODIUM BICARBONATE 50 MEQ: 84 INJECTION, SOLUTION INTRAVENOUS at 00:51

## 2024-02-21 RX ADMIN — Medication 1 TABLET: at 19:28

## 2024-02-21 RX ADMIN — IPRATROPIUM BROMIDE AND ALBUTEROL SULFATE 3 ML: .5; 3 SOLUTION RESPIRATORY (INHALATION) at 07:23

## 2024-02-21 RX ADMIN — OXYCODONE HYDROCHLORIDE 5 MG: 5 TABLET ORAL at 14:47

## 2024-02-21 RX ADMIN — SODIUM CHLORIDE, POTASSIUM CHLORIDE, SODIUM LACTATE AND CALCIUM CHLORIDE: 600; 310; 30; 20 INJECTION, SOLUTION INTRAVENOUS at 14:51

## 2024-02-21 RX ADMIN — SENNOSIDES AND DOCUSATE SODIUM 2 TABLET: 8.6; 5 TABLET ORAL at 08:00

## 2024-02-21 RX ADMIN — LEVOTHYROXINE SODIUM 112 MCG: 0.11 TABLET ORAL at 07:01

## 2024-02-21 RX ADMIN — SODIUM CHLORIDE, POTASSIUM CHLORIDE, SODIUM LACTATE AND CALCIUM CHLORIDE: 600; 310; 30; 20 INJECTION, SOLUTION INTRAVENOUS at 01:54

## 2024-02-21 RX ADMIN — Medication 1 TABLET: at 13:46

## 2024-02-21 RX ADMIN — ASPIRIN 81 MG CHEWABLE TABLET 81 MG: 81 TABLET CHEWABLE at 13:45

## 2024-02-21 RX ADMIN — ALLOPURINOL 100 MG: 100 TABLET ORAL at 09:24

## 2024-02-21 RX ADMIN — ATORVASTATIN CALCIUM 20 MG: 10 TABLET, FILM COATED ORAL at 07:59

## 2024-02-21 RX ADMIN — PANTOPRAZOLE SODIUM 40 MG: 40 TABLET, DELAYED RELEASE ORAL at 07:59

## 2024-02-21 RX ADMIN — PANTOPRAZOLE SODIUM 40 MG: 40 TABLET, DELAYED RELEASE ORAL at 16:44

## 2024-02-21 RX ADMIN — ACETAMINOPHEN 650 MG: 325 TABLET ORAL at 22:48

## 2024-02-21 RX ADMIN — SODIUM POLYSTYRENE SULFONATE 30 G: 15 SUSPENSION ORAL; RECTAL at 00:48

## 2024-02-21 RX ADMIN — ALLOPURINOL 100 MG: 100 TABLET ORAL at 19:28

## 2024-02-21 RX ADMIN — DEXTROSE MONOHYDRATE 25 G: 25 INJECTION, SOLUTION INTRAVENOUS at 01:24

## 2024-02-21 RX ADMIN — SODIUM CHLORIDE 5 UNITS: 9 INJECTION, SOLUTION INTRAVENOUS at 00:56

## 2024-02-21 RX ADMIN — CALCIUM GLUCONATE 1 G: 98 INJECTION, SOLUTION INTRAVENOUS at 00:51

## 2024-02-21 RX ADMIN — IPRATROPIUM BROMIDE AND ALBUTEROL SULFATE 3 ML: .5; 3 SOLUTION RESPIRATORY (INHALATION) at 20:52

## 2024-02-21 ASSESSMENT — ACTIVITIES OF DAILY LIVING (ADL)
ADLS_ACUITY_SCORE: 39
DEPENDENT_IADLS:: LAUNDRY;MEAL PREPARATION
ADLS_ACUITY_SCORE: 38
ADLS_ACUITY_SCORE: 37
ADLS_ACUITY_SCORE: 38
ADLS_ACUITY_SCORE: 37
ADLS_ACUITY_SCORE: 37
ADLS_ACUITY_SCORE: 38
ADLS_ACUITY_SCORE: 35

## 2024-02-21 NOTE — PROGRESS NOTES
02/21/24 1120   Appointment Info   Signing Clinician's Name / Credentials (PT) Graciela Covarrubias PT   Rehab Comments (PT) Seen in ED .   Quick Adds   Quick Adds Certification   Living Environment   People in Home alone   Current Living Arrangements house   Home Accessibility stairs to enter home   Number of Stairs, Main Entrance 4   Stair Railings, Main Entrance railings on both sides of stairs   Transportation Anticipated family or friend will provide   Self-Care   Usual Activity Tolerance good   Current Activity Tolerance moderate   Equipment Currently Used at Home cane, straight  (has FWW and 4WW)   Fall history within last six months yes   Number of times patient has fallen within last six months 1   Activity/Exercise/Self-Care Comment patient independent with mobility and ADL,drives.Daughter assists with laundry.   General Information   Onset of Illness/Injury or Date of Surgery 02/20/24   Referring Physician Alexia Gan   Patient/Family Therapy Goals Statement (PT) return home   Pertinent History of Current Problem (include personal factors and/or comorbidities that impact the POC) admitted after fall at home and R knee contusion.Ptient has ahx of compression fx L 2,,breast Ca,HTN,CKD,TIA,COPD   Existing Precautions/Restrictions fall   Weight-Bearing Status - LLE full weight-bearing   Weight-Bearing Status - RLE weight-bearing as tolerated   Cognition   Affect/Mental Status (Cognition) WFL   Orientation Status (Cognition) oriented x 4   Pain Assessment   Patient Currently in Pain Yes, see Vital Sign flowsheet   Bed Mobility   Supine-Sit [place] (Bed Mobility) minimum assist (75% patient effort)   Bed Mobility Limitations decreased ability to use arms for pushing/pulling   Impairments Contributing to Impaired Bed Mobility pain   Assistive Device (Bed Mobility) bed rails   Transfers   Maintains Weight-bearing Status (Transfers) verbal cues to maintain   Sit-Stand Transfer   Sit-Stand [place]  (Transfers) contact guard   Assistive Device (Sit-Stand Transfers) walker, front-wheeled   Stand-Sit Transfer   Stand-Sit [place] (Transfers) contact guard   Assistive Device (Stand-Sit Transfers) walker, front-wheeled   Gait/Stairs (Locomotion)   [place] Level (Gait) contact guard   Assistive Device (Gait) walker, front-wheeled   Distance in Feet (Gait) 10 feet in room   Pattern (Gait) step-through   Deviations/Abnormal Patterns (Gait) antalgic;hannah decreased   Clinical Impression   Criteria for Skilled Therapeutic Intervention Yes, treatment indicated   PT Diagnosis (PT) impaired functional mobility   Influenced by the following impairments pain   Functional limitations due to impairments bed mobility ,gait,ROM.strength   Clinical Presentation (PT Evaluation Complexity) stable   Clinical Presentation Rationale pt preesnts as med diagnosed   Clinical Decision Making (Complexity) low complexity   Planned Therapy Interventions (PT) bed mobility training;gait training;home exercise program;stair training;strengthening;ROM (range of motion);transfer training;progressive activity/exercise   Risk & Benefits of therapy have been explained evaluation/treatment results reviewed;patient   Clinical Impression Comments Patient is a 89 yo female admitted after fall at home  sustaining R knee contusion .Preesnts with slight mobility deficits and gait difficulty .   PT Total Evaluation Time   PT [place] Low Complexity Minutes (78511) 15   Therapy Certification   Start of care date 02/21/24   Certification date from 02/21/24   Certification date to 02/28/24   Medical Diagnosis fall,R knee contusion   Physical Therapy Goals   PT Frequency Daily   PT Predicted Duration/Target Date for Goal Attainment 02/28/24   PT Goals Bed Mobility;Transfers;Gait;Stairs   PT: Bed Mobility Independent;Supine to/from sit   PT: Transfers Modified independent;Sit to/from stand;Assistive device   PT: Gait Modified  independent;Supervision/stand-by assist;Rolling walker;Greater than 200 feet   PT: Stairs Modified independent;4 stairs;Rail on both sides;Supervision/stand-by assist   Interventions   Interventions Quick Adds Gait Training   Gait Training   Gait Training Minutes (13548) 10   Symptoms Noted During/After Treatment (Gait Training) increased pain;fatigue   Treatment Detail/Skilled Intervention ambulated out on hallway   Distance in Feet 150 feet   [place] Level (Gait Training) contact guard   Physical Assistance Level (Gait Training) supervision;verbal cues;1 person assist   Weight Bearing (Gait Training) weight-bearing as tolerated   Assistive Device (Gait Training) rolling walker   Pattern Analysis (Gait Training) swing-through gait   Gait Analysis Deviations decreased hannah;decreased step length;decreased stride length;decreased toe-to-floor clearance;decreased weight-shifting ability   Impairments (Gait Analysis/Training) pain;ROM decreased;strength decreased   PT Discharge Planning   PT Plan mobilize with FWW ,4 steps   PT Discharge Recommendation (DC Rec) home with assist;home with home care physical therapy   PT Rationale for DC Rec Moving well ,was independent with mobilityprior,Daughter can stay wt pt after d/c.   PT Brief overview of current status min assist for OOB ,CGA to amb 150 feet with FWW   PT Equipment Needed at Discharge walker, rolling;other (see comments)  (or 4WW)   Total Session Time   Timed Code Treatment Minutes 10   Total Session Time (sum of timed and untimed services) 25   [facility]  OUTPATIENT PHYSICAL THERAPY EVALUATION  PLAN OF TREATMENT FOR OUTPATIENT REHABILITATION  (COMPLETE FOR INITIAL CLAIMS ONLY)  Patient's Last Name, First Name, M.I.  YOB: 1935  Sussy Cole                        Provider's Name  [facility] Medical Record No.  [id number]                             Onset Date:  02/20/24    Start of Care Date:  02/21/24   Type:     _X_PT   ___OT   ___SLP Medical Diagnosis:  fall,R knee contusion              PT Diagnosis:  impaired functional mobility Visits from SOC:  1     See note for plan of treatment, functional goals and certification details    I CERTIFY THE NEED FOR THESE SERVICES FURNISHED UNDER        THIS PLAN OF TREATMENT AND WHILE UNDER MY CARE     (Physician co-signature of this document indicates review and certification of the therapy plan).

## 2024-02-21 NOTE — UTILIZATION REVIEW
"Admission Status; Secondary Review Determination     Under the authority of the Utilization Management Committee, the utilization review process indicated a secondary review on the above patient.  The review outcome is based on review of the medical records, discussions with staff, and applying clinical experience noted on the date of the review.       (x) Observation Status Appropriate - This patient does not meet hospital inpatient criteria and is placed in observation status. If this patient's primary payer is Medicare and was admitted as an inpatient, Condition Code 44 should be used and patient status changed to \"observation\".     RATIONALE FOR DETERMINATION     Ms. Cole is a 87 yo female who presented to the ED with knee pain and inability to ambulate d/t right knee contusion.  Imaging negative for acute fracture.  She was also noted to have mild hyperkalemia and SHIRA; both of which has resolved with treatment and IVF since hospitalization.  At this time, she is clinically improved, vitals are stable, she is tolerating a diet and awaiting PT/OT assessments.     The severity of illness, intensity of service provided, expected LOS and risk for adverse outcome make the care appropriate for further observation; however, doesn't meet criteria for hospital inpatient admission. Dr Gan notified of this determination, awaiting call back,.    The information on this document is developed by the utilization review team in order for the business office to ensure compliance.  This only denotes the appropriateness of proper admission status and does not reflect the quality of care rendered.         The definitions of Inpatient Status and Observation Status used in making the determination above are those provided in the CMS Coverage Manual, Chapter 1 and Chapter 6, section 70.4.      Sincerely,    Deyanira Canela, DO  Utilization Review Physician Advisor   "

## 2024-02-21 NOTE — ED NOTES
[facility] ED Handoff Report    ED Chief Complaint: fall    ED Diagnosis:  (S80.01XA) Contusion of right knee, initial encounter       PMH:    Past Medical History:   Diagnosis Date    Breast cancer (H)     Closed compression fracture of L2 vertebra, initial encounter (H) 06/02/2021    COPD (chronic obstructive pulmonary disease) (H)     Duodenal ulcer 04/2020    Duodenal ulcer due to nonsteroidal anti-inflammatory drug (NSAID) 04/03/2020    Formatting of this note might be different from the original. 3-26-20  IR embolization on 3-29-20, multiple units of blood. Admit hgb  6.1. aleve stopped. Patient had been taking aleve with prednisone.     Gastrointestinal hemorrhage with melena 03/26/2020    Added automatically from request for surgery [id number] Formatting of this note might be different from the original. Added automatically from request for surgery [id number]     History of humerus fracture 04/29/2021    HTN (hypertension)     Osteoporosis     Status post shoulder surgery 03/24/2009 12/14/2007  Unremarkable.  Repeat in 10 years Formatting of this note might be different from the original. 12/14/2007  Unremarkable.  Repeat in 10 years     Thyroid disease         Code Status:  Full Code     Falls Risk: Yes Band: Applied    Current Living Situation/Residence: lives alone     Elimination Status: Continent: [facility]     Activity Level: 2 assist    Patients Preferred Language:  English     Needed: No    Vital Signs:  BP (!) 140/60   Pulse 101   Temp 97.8  F (36.6  C) (Oral)   Resp 20   SpO2 93%      Cardiac Rhythm: N/A    Pain Score: 5/10    Is the Patient Confused:  No    Last Food or Drink: 02/20/24 at 2200    Focused Assessment:  See charting    Tests Performed: Done: Labs and Imaging    Treatments Provided:  See charting    Family Dynamics/Concerns: No    Family Updated On Visitor Policy: Yes    Plan of Care Communicated to Family: Yes    Who Was Updated about Plan of Care: Pt's  daughter    Belongings Checklist Done and Signed by Patient: Yes    Belongings Sent with Patient: yes    Medications sent with patient: N/A    RN: Fede Presley RN 2/20/2024 10:25 PM

## 2024-02-21 NOTE — PROGRESS NOTES
Accepted patient from ED 24.  After discussing hyperkalemia orders with pharmacist recommending protocol order be run by MD.  When attempting to give IV medications IV found to be infiltrated.  New IV placed and medications given.   Potassium 5.0 at 0200

## 2024-02-21 NOTE — PROGRESS NOTES
PRIMARY DIAGNOSIS: ACUTE PAIN  OUTPATIENT/OBSERVATION GOALS TO BE MET BEFORE DISCHARGE:  1. Pain Status: Improved-controlled with oral pain medications.    2. Return to near baseline physical activity: No    3. Cleared for discharge by consultants (if involved): No    Discharge Planner Nurse   Safe discharge environment identified: No  Barriers to discharge: Yes       Entered by: Oxana Giraldo RN 02/21/2024 2:53 PM   Pt arrived to Short Stay room 14 at 1435. A&O x 4. C/o right knee pain 3/10, PRN Oxycodone given. Infusing LR 75 ml/hr. Tele: Sinus Tachy. Hgb 7.4, potassium 4.6. Assist of 1 to bedside commode. Waiting TCU placement.   Please review provider order for any additional goals.   Nurse to notify provider when observation goals have been met and patient is ready for discharge.

## 2024-02-21 NOTE — ED PROVIDER NOTES
EMERGENCY DEPARTMENT ENCOUNTER     NAME: Sussy Cole   AGE: 88 year old female   YOB: 1935   MRN: [id number]   EVALUATION DATE & TIME: 2/20/2024  7:34 PM   PCP: Rosalee Bravo     Chief Complaint   Patient presents with    Fall   :    FINAL IMPRESSION       1. Contusion of right knee, initial encounter           ED COURSE & MEDICAL DECISION MAKING    7:45 PM Met with the patient and performed my initial exam.  9:44 PM Re-evaluated patient. Patient has a big bruise on her right knee and notes she lives alone and can't walk on it. Plan to admit patient.  9:52 PM Spoke to Dr. Heck, Hospitalist, regarding plan for admission. Patient will be admitted.    Pertinent Labs & Imaging studies reviewed. (See chart for details)   88 year old female  presents to the Emergency Department for evaluation of right hip and knee pain following a fall.  Patient tripped on a shoelace so this was mechanical in nature. Initial Vitals Reviewed. Initial exam notable for generally well-appearing patient who looks to have had a previous fall as she has an old bruise on her right forehead and then up above it has an abrasion, some dried blood on her external nose but no septal hematoma or nosebleed, no intraoral bleeding, GCS 15 and no spinal tenderness.  She does have an ecchymotic right knee so I considered contusion versus fracture, dislocation.  X-rays of the hip and knee are negative.  CT scan of the head, cervical spine, and facial bones do not show acute injuries.  There was a comment about an age-indeterminate spinal issue, but she has no tenderness there and I do not think this represents an acute fracture.  She did receive some fentanyl for pain control here, and the knee contusion is pretty significant.  Ultimately, she lives completely independently and cannot stand up on her leg due to the discomfort.  We are using Ace wrap, ice, and pain control but she will need to stay overnight for observation.  Case  discussed with hospitalist for admission.           At the conclusion of the encounter I discussed the results of all of the tests and the disposition. The questions were answered. The patient or family acknowledged understanding and was agreeable with the care plan.     0 minutes critical care time, see procedure note below for details if relevant    Medical Decision Making    History:  Supplemental history from: Documented in chart, daughter  External Record(s) reviewed: Documented in chart, PCP visit 1/18/24    Work Up:  Chart documentation includes differential considered and any EKGs or imaging independently interpreted by provider, where specified.  In additional to work up documented, I considered the following work up: Documented in chart, if applicable.    External consultation:  Discussion of management with another provider: Documented in chart, if applicable    Complicating factors:  Care impacted by chronic illness: Cancer/Chemotherapy, Chronic Kidney Disease, Hypertension, and Other: Osteoporosis  Care affected by social determinants of health: Access to Affordable Health Care    Disposition considerations: Admit.        MEDICATIONS GIVEN IN THE EMERGENCY:   Medications   oxyCODONE (ROXICODONE) tablet 5 mg (5 mg Oral Not Given 2/20/24 2027)   fentaNYL (PF) (SUBLIMAZE) injection 25 mcg (25 mcg Intravenous $Given 2/20/24 2027)      NEW PRESCRIPTIONS STARTED AT TODAY'S ER VISIT   New Prescriptions    No medications on file     ================================================================   HISTORY OF PRESENT ILLNESS       Patient information was obtained from: Patient   Use of Intrepreter: N/A    Sussy DUMONT Douglas is a 88 year old female with history of breast cancer, HTN, osteoporosis, CKD 3, TIA, and closed compression fracture of L2 vertebra, who presents with fall.    Per EMS, patient was walking in her living room and tripped on her shoe laces and had a mechanical fall at home where she fell  frontwards on a carpeted floor. She has some swelling and bruising to her right forehead and old bruise on her left forehead. No LOC. She is not on a blood thinner.    Per patient, she notes she stepped on her shoe laces and fell forward and notes she is unsure if she hit her face on the ground. Endorses pain from her right-sided hip to right knee as well as right lower back, noting pain started after fall. No other reported complaints or concerns at this time.    ================================================================        PAST HISTORY     PAST MEDICAL HISTORY:   Past Medical History:   Diagnosis Date    Breast cancer (H)     Closed compression fracture of L2 vertebra, initial encounter (H) 06/02/2021    COPD (chronic obstructive pulmonary disease) (H)     Duodenal ulcer 04/2020    Duodenal ulcer due to nonsteroidal anti-inflammatory drug (NSAID) 04/03/2020    Formatting of this note might be different from the original. 3-26-20  IR embolization on 3-29-20, multiple units of blood. Admit hgb  6.1. aleve stopped. Patient had been taking aleve with prednisone.     Gastrointestinal hemorrhage with melena 03/26/2020    Added automatically from request for surgery [id number] Formatting of this note might be different from the original. Added automatically from request for surgery [id number]     History of humerus fracture 04/29/2021    HTN (hypertension)     Osteoporosis     Status post shoulder surgery 03/24/2009 12/14/2007  Unremarkable.  Repeat in 10 years Formatting of this note might be different from the original. 12/14/2007  Unremarkable.  Repeat in 10 years     Thyroid disease       PAST SURGICAL HISTORY:   Past Surgical History:   Procedure Laterality Date    BACK SURGERY      BONE MARROW BIOPSY, BONE SPECIMEN, NEEDLE/TROCAR Right 4/14/2022    Procedure: UNILATERAL BONE MARROW BIOPSY, SIDE DETERMINDED DAY OF PROCEDURE, ILIAC CREST;  Surgeon: Enzo Mitchell MD;  Location: [facility]  "MESENTERIC ANGIOGRAM  03/29/2020    IR VISCERAL ANGIOGRAM  3/29/2020    LUMPECTOMY BREAST      MIDLINE INSERTION - DOUBLE LUMEN  03/29/2020         NM ESOPHAGOGASTRODUODENOSCOPY TRANSORAL DIAGNOSTIC N/A 03/29/2020    Procedure: ESOPHAGOGASTRODUODENOSCOPY (EGD) with Epi injection and cautery;  Surgeon: Pacheco Echeverria DO;  Location: [facility];  Service: Gastroenterology      CURRENT MEDICATIONS:   allopurinoL (ZYLOPRIM) 100 MG tablet  aspirin (ASA) 81 MG chewable tablet  atorvastatin (LIPITOR) 20 MG tablet  calcium-vitamin D3-vitamin K (VIACTIV) 650 mg-12.5 mcg-40 mcg Chew  clotrimazole-betamethasone (LOTRISONE) 1-0.05 % external cream  diclofenac sodium (VOLTAREN) 1 % Gel  hydrocortisone (CORTAID) 1 % external cream  levothyroxine (SYNTHROID, LEVOTHROID) 112 MCG tablet  lisinopriL (PRINIVIL,ZESTRIL) 20 MG tablet  multivitamin with minerals (THERA-M) 9 mg iron-400 mcg Tab tablet  omeprazole (PRILOSEC) 20 MG capsule  senna-docusate (SENNOSIDES-DOCUSATE SODIUM) 8.6-50 mg tablet      ALLERGIES:   Allergies   Allergen Reactions    Hydrocodone-Acetaminophen Nausea and Vomiting    Amoxicillin Other (See Comments)     Sores in mouth, tongue slightly swollen    Hydrochlorothiazide Unknown    Levofloxacin Other (See Comments)     \"mouth gets raw\"    Morphine Nausea and Vomiting      FAMILY HISTORY:   Family History   Problem Relation Age of Onset    Glaucoma Brother       SOCIAL HISTORY:   Social History     Socioeconomic History    Marital status: Single   Tobacco Use    Smoking status: Former    Smokeless tobacco: Never   Substance and Sexual Activity    Alcohol use: No    Drug use: No        VITALS  Patient Vitals for the past 24 hrs:   BP Temp Temp src Pulse Resp SpO2   02/20/24 2131 (!) 140/60 -- -- 101 -- 93 %   02/1935 -- 97.8  F (36.6  C) Oral -- 20 --        ================================================================    PHYSICAL EXAM     VITAL SIGNS: BP (!) 140/60   Pulse 101   Temp 97.8  F " (36.6  C) (Oral)   Resp 20   SpO2 93%    Constitutional:  Awake, no acute distress   HENT:  Atraumatic, oropharynx without exudate or erythema, membranes moist. An abrasion on right forehead. Old bruise on left forehead. Dry blood on external nose.  Lymph:  No adenopathy  Eyes: EOM intact, PERRL, no injection  Neck: Supple  Respiratory:  Clear to auscultation bilaterally, no wheezes or crackles   Cardiovascular:  Regular rate and rhythm, single S1 and S2   GI:  Soft, nontender, nondistended, no rebound or guarding   Musculoskeletal:  Moves all extremities, no lower extremity edema, no deformities. Tenderness over the right hip. Notable ecchymosis over the right knee.  Skin:  Warm, dry  Neurologic:  Alert and oriented x3, no focal deficits noted, GCS 15      ================================================================  LAB       All pertinent labs reviewed and interpreted.   Labs Ordered and Resulted from Time of ED Arrival to Time of ED Departure - No data to display     ===============================================================  RADIOLOGY       Reviewed all pertinent imaging. Please see official radiology report.   XR Pelvis and Hip Right 2 Views   Final Result   IMPRESSION: No fracture. Mild degenerative changes in both hips. Osteopenia.      XR Knee Right 1/2 Views   Final Result   IMPRESSION: No fracture. Osteopenia. Advanced degenerative changes in the medial compartment with mild degenerative changes in the lateral compartment. Moderate soft tissue swelling along the anterior margin of the patella, distal quadriceps tendon as    well as the proximal tibia.      CT Cervical Spine w/o Contrast   Final Result   IMPRESSION:   HEAD CT:   1.  No acute intracranial process.      FACIAL BONE CT:   1.  No facial bone or mandibular fracture.      CERVICAL SPINE CT:   1.  Mild chronic compression superior aspect of T1; similar to CTA head and neck 12/24/2021.    2.  Fracture at the tip of the left C1  transverse process; age indeterminate.      CT Facial Bones without Contrast   Final Result   IMPRESSION:   HEAD CT:   1.  No acute intracranial process.      FACIAL BONE CT:   1.  No facial bone or mandibular fracture.      CERVICAL SPINE CT:   1.  Mild chronic compression superior aspect of T1; similar to CTA head and neck 12/24/2021.    2.  Fracture at the tip of the left C1 transverse process; age indeterminate.      Head CT w/o contrast   Final Result   IMPRESSION:   HEAD CT:   1.  No acute intracranial process.      FACIAL BONE CT:   1.  No facial bone or mandibular fracture.      CERVICAL SPINE CT:   1.  Mild chronic compression superior aspect of T1; similar to CTA head and neck 12/24/2021.    2.  Fracture at the tip of the left C1 transverse process; age indeterminate.            ================================================================  EKG     EKG reviewed interpreted by me shows sinus rhythm with a rate of 103, normal axis, QTc 421 with no acute ST or T wave changes since December 2021    I have independently reviewed and interpreted the EKG(s) documented above.     ================================================================  PROCEDURES         I, Janis Sarmiento, am serving as a scribe to document services personally performed by Dr. New based on my observation and the provider's statements to me. I, Madisyn New MD attest that Janis Sarmiento is acting in a scribe capacity, has observed my performance of the services and has documented them in accordance with my direction.   Madisyn New M.D.   Emergency Medicine   [facility] EMERGENCY DEPARTMENT  [address] [place] [id number]  [phone]  Dept: [phone]      Madisyn New MD  02/20/24 2153       Madisyn New MD  02/20/24 2156

## 2024-02-21 NOTE — ED NOTES
Bed: JNED-24  Expected date: 2/20/24  Expected time: 7:22 PM  Means of arrival: Ambulance  Comments:  [facility]  88F  Fall

## 2024-02-21 NOTE — PROGRESS NOTES
[facility]    Medicine Progress Note - Hospitalist Service    Date of Admission:  2/20/2024    Assessment & Plan   Sussy Cole is a 88 year old female who with a medical history significant for history of breast cancer, hypertension, osteoporosis, CKD stage III, prior history of TIA, close compression fractures of L2, gout and other medical comorbidities who is admitted status post fall  with right knee contusion and difficulty with ambulation.  Patient found to be in renal failure with hyperkalemia     S/p post fall  Right knee contusion   Trauma work up negative  Chronic compression fracture of the superior aspect of T1, age-indeterminate fracture of tip of left C1 transverse process  pain control as needed  Imaging right knee shows degenerative changes and soft tissue swelling  PT recommend home with assist/home PT  OT recommend TCU versus home with daily assist, will discuss with patient and daughter regarding options with assist    Addendum: Spoke with daughter Rukhsana, mentioned that her Sister Grace will be coming from [place] tomorrow and she will be living with Sussy for sometime. Will plan for discharge tomorrow with daughter    SHIRA on CKD - improving  Hyperkalemia - resolved  S/p calcium gluconate, D50, insulin, bicarb, nebulizers  Monitor creatinine  Avoid nephrotoxic drugs    Elevated troponin likely secondary to demand  Downtrending  Denies chest pain, EKG with no acute ischemic changes    Chronic anemia, macrocytic  Hb 8.7 -> 7.4  Has effusions around the right knee joint  No evidence of bleeding  Will monitor hemoglobin    Hypertension  hold lisinopril.due to SHIRA     Hypothyroidism  Synthroid    Gout  continue allopurinol    Sensorineural hearing loss of both ears        Diet: Regular Diet Adult    DVT Prophylaxis: Ambulate every shift  Jenkins Catheter: Not present  Lines: None     Cardiac Monitoring: ACTIVE order. Indication: Tachyarrhythmias, acute (48 hours)  Code  Status: Full Code      Clinically Significant Risk Factors Present on Admission        # Hyperkalemia: Highest K = 6.3 mmol/L in last 2 days, will monitor as appropriate         # Drug Induced Platelet Defect: home medication list includes an antiplatelet medication   # Hypertension: Noted on problem list                 Disposition Plan      Expected Discharge Date: 02/21/2024                    Alexia Gan MD  Hospitalist Service  [facility]  Securely message with [employer] (more info)  Text page via [employer] Paging/Directory   ______________________________________________________________________    Interval History   Patient has been examined at the bedside, new to me today.  Has pain around the right knee, with no acute changes on imaging.   Spoke with daughter Rukhsana, mentioned that her Sister Grace will be coming from [place] tomorrow and she will be living with [facility]. Will plan for discharge tomorrow with daughter      Physical Exam   Vital Signs: Temp: 98  F (36.7  C) Temp src: Temporal BP: 136/61 Pulse: 93   Resp: 12 SpO2: 94 % O2 Device: None (Room air)    Weight: 0 lbs 0 oz    General Aox3, appropriate affect, NAD, on RA  Skin: bruising over the forehead  Lungs: CTAB  Heart RRR, No M/R/G  Abd- Soft, NT, BS+  Extremity- Moving all extremities, No digital clubbing,   No edema, R knee swollen and ecchymosis, ROM normal  Neuro- No gross focal deficits    Medical Decision Making       45 MINUTES SPENT BY ME on the date of service doing chart review, history, exam, documentation & further activities per the note.      Data     I have personally reviewed the following data over the past 24 hrs:    5.0  \   7.4 (L)   / 134 (L)     143 109 (H) 32.0 (H) /  99   4.6 26 1.14 (H) \     Trop: 29 (H) BNP: N/A       Imaging results reviewed over the past 24 hrs:   Recent Results (from the past 24 hour(s))   Head CT w/o contrast    Narrative    EXAM: CT HEAD W/O CONTRAST, CT CERVICAL SPINE W/O  CONTRAST, CT FACIAL BONES WITHOUT CONTRAST  LOCATION: [facility]  DATE: 2/20/2024    INDICATION: Head, face, and neck injury  COMPARISON: None.  TECHNIQUE:   1) Routine CT Head without IV contrast. Multiplanar reformats. Dose reduction techniques were used.  2) Routine CT Facial Bones without IV contrast. Multiplanar reformats. Dose reduction techniques were used.  3) Routine CT Cervical Spine without IV contrast. Multiplanar reformats. Dose reduction techniques were used.    FINDINGS:  HEAD CT:   INTRACRANIAL CONTENTS: No intracranial hemorrhage, extraaxial collection, or mass effect.  No CT evidence of acute infarct. Mild presumed chronic small vessel ischemic changes. Mild generalized volume loss. No hydrocephalus.     OSSEOUS STRUCTURES/SOFT TISSUES: No significant abnormality.     FACIAL BONE CT:  OSSEOUS STRUCTURES/SOFT TISSUES: No localized soft tissue swelling/inflammation. No facial bone fracture or malalignment. No evidence for dental trauma or periapical abscess.    ORBITAL CONTENTS: No acute abnormality.    SINUSES: Small amount of fluid right maxillary sinus.    CERVICAL SPINE CT:   VERTEBRA: Mild chronic compression superior aspect of T1; similar to CTA head and neck 12/24/2021. Fracture at the tip of the left C1 transverse process; age indeterminate.    CANAL/FORAMINA: Mild degenerative changes with mild canal narrowing C6-C7. Multilevel mild neural foraminal narrowing.    PARASPINAL: No extraspinal abnormality. Visualized lung fields are clear.      Impression    IMPRESSION:  HEAD CT:  1.  No acute intracranial process.    FACIAL BONE CT:  1.  No facial bone or mandibular fracture.    CERVICAL SPINE CT:  1.  Mild chronic compression superior aspect of T1; similar to CTA head and neck 12/24/2021.   2.  Fracture at the tip of the left C1 transverse process; age indeterminate.   CT Facial Bones without Contrast    Narrative    EXAM: CT HEAD W/O CONTRAST, CT CERVICAL SPINE W/O  CONTRAST, CT FACIAL BONES WITHOUT CONTRAST  LOCATION: [facility]  DATE: 2/20/2024    INDICATION: Head, face, and neck injury  COMPARISON: None.  TECHNIQUE:   1) Routine CT Head without IV contrast. Multiplanar reformats. Dose reduction techniques were used.  2) Routine CT Facial Bones without IV contrast. Multiplanar reformats. Dose reduction techniques were used.  3) Routine CT Cervical Spine without IV contrast. Multiplanar reformats. Dose reduction techniques were used.    FINDINGS:  HEAD CT:   INTRACRANIAL CONTENTS: No intracranial hemorrhage, extraaxial collection, or mass effect.  No CT evidence of acute infarct. Mild presumed chronic small vessel ischemic changes. Mild generalized volume loss. No hydrocephalus.     OSSEOUS STRUCTURES/SOFT TISSUES: No significant abnormality.     FACIAL BONE CT:  OSSEOUS STRUCTURES/SOFT TISSUES: No localized soft tissue swelling/inflammation. No facial bone fracture or malalignment. No evidence for dental trauma or periapical abscess.    ORBITAL CONTENTS: No acute abnormality.    SINUSES: Small amount of fluid right maxillary sinus.    CERVICAL SPINE CT:   VERTEBRA: Mild chronic compression superior aspect of T1; similar to CTA head and neck 12/24/2021. Fracture at the tip of the left C1 transverse process; age indeterminate.    CANAL/FORAMINA: Mild degenerative changes with mild canal narrowing C6-C7. Multilevel mild neural foraminal narrowing.    PARASPINAL: No extraspinal abnormality. Visualized lung fields are clear.      Impression    IMPRESSION:  HEAD CT:  1.  No acute intracranial process.    FACIAL BONE CT:  1.  No facial bone or mandibular fracture.    CERVICAL SPINE CT:  1.  Mild chronic compression superior aspect of T1; similar to CTA head and neck 12/24/2021.   2.  Fracture at the tip of the left C1 transverse process; age indeterminate.   CT Cervical Spine w/o Contrast    Narrative    EXAM: CT HEAD W/O CONTRAST, CT CERVICAL SPINE W/O  CONTRAST, CT FACIAL BONES WITHOUT CONTRAST  LOCATION: [facility]  DATE: 2/20/2024    INDICATION: Head, face, and neck injury  COMPARISON: None.  TECHNIQUE:   1) Routine CT Head without IV contrast. Multiplanar reformats. Dose reduction techniques were used.  2) Routine CT Facial Bones without IV contrast. Multiplanar reformats. Dose reduction techniques were used.  3) Routine CT Cervical Spine without IV contrast. Multiplanar reformats. Dose reduction techniques were used.    FINDINGS:  HEAD CT:   INTRACRANIAL CONTENTS: No intracranial hemorrhage, extraaxial collection, or mass effect.  No CT evidence of acute infarct. Mild presumed chronic small vessel ischemic changes. Mild generalized volume loss. No hydrocephalus.     OSSEOUS STRUCTURES/SOFT TISSUES: No significant abnormality.     FACIAL BONE CT:  OSSEOUS STRUCTURES/SOFT TISSUES: No localized soft tissue swelling/inflammation. No facial bone fracture or malalignment. No evidence for dental trauma or periapical abscess.    ORBITAL CONTENTS: No acute abnormality.    SINUSES: Small amount of fluid right maxillary sinus.    CERVICAL SPINE CT:   VERTEBRA: Mild chronic compression superior aspect of T1; similar to CTA head and neck 12/24/2021. Fracture at the tip of the left C1 transverse process; age indeterminate.    CANAL/FORAMINA: Mild degenerative changes with mild canal narrowing C6-C7. Multilevel mild neural foraminal narrowing.    PARASPINAL: No extraspinal abnormality. Visualized lung fields are clear.      Impression    IMPRESSION:  HEAD CT:  1.  No acute intracranial process.    FACIAL BONE CT:  1.  No facial bone or mandibular fracture.    CERVICAL SPINE CT:  1.  Mild chronic compression superior aspect of T1; similar to CTA head and neck 12/24/2021.   2.  Fracture at the tip of the left C1 transverse process; age indeterminate.   XR Knee Right 1/2 Views    Narrative    EXAM: XR KNEE RIGHT 1/2 VIEWS  LOCATION: [facility]  DATE: 2/20/2024    INDICATION: Knee pain. Recent fall.  COMPARISON: None.      Impression    IMPRESSION: No fracture. Osteopenia. Advanced degenerative changes in the medial compartment with mild degenerative changes in the lateral compartment. Moderate soft tissue swelling along the anterior margin of the patella, distal quadriceps tendon as   well as the proximal tibia.   XR Pelvis and Hip Right 2 Views    Narrative    EXAM: XR PELVIS AND HIP RIGHT 2 VIEWS  LOCATION: [facility]  DATE: 2/20/2024    INDICATION: Pain. Recent fall.  COMPARISON: None.      Impression    IMPRESSION: No fracture. Mild degenerative changes in both hips. Osteopenia.

## 2024-02-21 NOTE — H&P
Kesha please you need to be sent back to be sensitive please talk to my appointment 2 (was  [facility]    History and Physical - Hospitalist Service       Date of Admission:  2/20/2024    Assessment & Plan      Sussy Cole is a 88 year old female who with a medical history significant for history of breast cancer, hypertension, osteoporosis, CKD stage III, prior history of TIA, close compression fractures of L2, gout and other medical comorbidities who is admitted status post fall  with right knee contusion and difficulty with ambulation.  Patient found to be in renal failure with hyperkalemia.    Patient Active Problem List   Diagnosis    AK (actinic keratosis)    B12 deficiency    Breast cancer (H)    Carpal tunnel syndrome of right wrist    Gout    Hypertension    Hypothyroidism    Macrocytic anemia    Osteoporosis    Primary osteoarthritis of both knees    Pseudophakia of both eyes    Rotator cuff tendonitis    Skin cancer    Tinnitus of both ears    Venous stasis dermatitis of both lower extremities    Obesity (BMI 35.0-39.9) with comorbidity (H)    Ulnar neuropathy of right upper extremity    Chronic kidney disease, stage 3 (H)    TIA (transient ischemic attack)    Vertebrobasilar insufficiency    Intracranial atherosclerosis    Sensorineural hearing loss (SNHL) of both ears    Contusion of right knee, initial encounter      S/p post fall-pain control as needed.  PT OT in the morning    Right knee contusion-wound care by nursing overnight.  Consult wound care on the morning.  A.m. team to follow    Hyperkalemia-cardiac telemetry.  1 amp of D50, regular insulin 5  units, bicarb 50 mEq and nebulizers ordered.  Check EKG.  Placed on cardiac telemetry.    Hyperkalemia-patient given an amp of bicarb, regular insulin, calcium gluconate, breathing treatments.  Admit to cardiac telemetry for now    Chronic anemia-hemoglobin appears stable.    ARF/CKD-avoid nephrotoxic  drugs    Hypertension-hold lisinopril.    Hypothyroidism-resume Synthroid    Osteoporosis-outpatient follow-up    Gout-continue allopurinol    Peripheral vascular disease-cardiovascular risk management    CKD stage III-avoid nephrotoxic drugs    Vertebrobasilar insufficiency-with possible ataxia and likely balance problems.  PT OT and  evaluation    Sensorineural hearing loss    Rest of patient's medical problems management remains unchanged        Diet:  Cardiac diet  DVT Prophylaxis: Low Risk/Ambulatory with no VTE prophylaxis indicated  Jenkins Catheter: Not present  Lines: None     Cardiac Monitoring: None  Code Status:  Full code    Clinically Significant Risk Factors Present on Admission                # Drug Induced Platelet Defect: home medication list includes an antiplatelet medication   # Hypertension: Noted on problem list                 Disposition Plan      Expected Discharge Date: 02/21/2024                  Chrissy Heck MD  Hospitalist Service  [facility]  Securely message with Vocera (more info)  Text page via [employer] Paging/Directory     ______________________________________________________________________    [facility]   [facility]. Right [facility] cont[facility]. Unable to walk. Lives alone. Ace wrap, ice, and observe        History of Present Illness   Sussy Cole is a 88 year old female who with a medical history significant for history of breast cancer, hypertension, osteoporosis, CKD stage III, prior history of TIA, close compression fractures of L2, gout and other medical comorbidities who is admitted status post fall  with right knee contusion and difficulty with ambulation.  Patient found to be in renal failure with hyperkalemia.        Patient was seen in the ER on admission.  She was awake, alert oriented to place person and time and could provide a history.  Per report, she was at baseline until tonight when she sustained a mechanical  fall.    Per EMS patient was walking in her living room and tripped on his shoelaces sustaining mechanical fall at home.  Patient reportedly fell forwards onto carpeted floor.  There was no head injury or loss of consciousness.  She had some swelling and bruising to her right forehead and an old bruise on her left forehead.  Patient is not on blood thinners.    Patient reports that she stepped on her shoelaces and fell forward and was unsure if she hit her face on the ground..  In the ED patient endorsed pain on the right side of her hip to her right knee as well as right lower back noting that the pain started after the fall.  She had no further complaints.        Workup done in the ER included a CBC with a white count of 8.7 which appears to be baseline for patient.    Imaging of the right lower extremity showed results below:    Narrative & Impression   EXAM: XR KNEE RIGHT 1/2 VIEWS  LOCATION: [facility]  DATE: 2/20/2024     INDICATION: Knee pain. Recent fall.  COMPARISON: None.                                                                      IMPRESSION: No fracture. Osteopenia. Advanced degenerative changes in the medial compartment with mild degenerative changes in the lateral compartment. Moderate soft tissue swelling along the anterior margin of the patella, distal quadriceps tendon as   well as the proximal tibia.       Narrative & Impression   EXAM: XR PELVIS AND HIP RIGHT 2 VIEWS  LOCATION: [facility]  DATE: 2/20/2024     INDICATION: Pain. Recent fall.  COMPARISON: None.                                                                      IMPRESSION: No fracture. Mild degenerative changes in both hips. Osteopenia.       Patient was given fentanyl and oxycodone and is being admitted for further pain control and home safety evaluation.  Patient lives alone and was having difficulty ambulating in the ER.        Past Medical History    Past Medical History:    Diagnosis Date    Breast cancer (H)     Closed compression fracture of L2 vertebra, initial encounter (H) 06/02/2021    COPD (chronic obstructive pulmonary disease) (H)     Duodenal ulcer 04/2020    Duodenal ulcer due to nonsteroidal anti-inflammatory drug (NSAID) 04/03/2020    Formatting of this note might be different from the original. 3-26-20  IR embolization on 3-29-20, multiple units of blood. Admit hgb  6.1. aleve stopped. Patient had been taking aleve with prednisone.     Gastrointestinal hemorrhage with melena 03/26/2020    Added automatically from request for surgery [id number] Formatting of this note might be different from the original. Added automatically from request for surgery [id number]     History of humerus fracture 04/29/2021    HTN (hypertension)     Osteoporosis     Status post shoulder surgery 03/24/2009 12/14/2007  Unremarkable.  Repeat in 10 years Formatting of this note might be different from the original. 12/14/2007  Unremarkable.  Repeat in 10 years     Thyroid disease        Past Surgical History   Past Surgical History:   Procedure Laterality Date    BACK SURGERY      BONE MARROW BIOPSY, BONE SPECIMEN, NEEDLE/TROCAR Right 4/14/2022    Procedure: UNILATERAL BONE MARROW BIOPSY, SIDE DETERMINDED DAY OF PROCEDURE, ILIAC CREST;  Surgeon: Enzo Mitchell MD;  Location: [facility] OR    IR MESENTERIC ANGIOGRAM  03/29/2020    IR VISCERAL ANGIOGRAM  3/29/2020    LUMPECTOMY BREAST      MIDLINE INSERTION - DOUBLE LUMEN  03/29/2020         NV ESOPHAGOGASTRODUODENOSCOPY TRANSORAL DIAGNOSTIC N/A 03/29/2020    Procedure: ESOPHAGOGASTRODUODENOSCOPY (EGD) with Epi injection and cautery;  Surgeon: Pacheco Echeverria DO;  Location: [facility];  Service: Gastroenterology       Prior to Admission Medications   Prior to Admission Medications   Prescriptions Last Dose Informant Patient Reported? Taking?   allopurinoL (ZYLOPRIM) 100 MG tablet   Yes No   Sig: [ALLOPURINOL (ZYLOPRIM) 100 MG TABLET]  Take 100 mg by mouth 2 (two) times a day.    aspirin (ASA) 81 MG chewable tablet   No No   Sig: Take 1 tablet (81 mg) by mouth daily   atorvastatin (LIPITOR) 20 MG tablet   No No   Sig: TAKE 1 TABLET (20 MG) BY MOUTH DAILY   calcium-vitamin D3-vitamin K (VIACTIV) 650 mg-12.5 mcg-40 mcg Chew   Yes No   Sig: [CALCIUM-VITAMIN D3-VITAMIN K (VIACTIV) 650 MG-12.5 MCG-40 MCG CHEW] Chew 1 tablet 2 (two) times a day.    clotrimazole-betamethasone (LOTRISONE) 1-0.05 % external cream   Yes No   Sig: Apply topically 2 times daily as needed   diclofenac sodium (VOLTAREN) 1 % Gel   No No   Sig: [DICLOFENAC SODIUM (VOLTAREN) 1 % GEL] Apply 2 g topically 4 (four) times a day as needed. 2g to LUE, 4g to LLE   hydrocortisone (CORTAID) 1 % external cream   Yes No   Sig: Apply topically 3 times daily as needed   levothyroxine (SYNTHROID, LEVOTHROID) 112 MCG tablet   Yes No   Sig: [LEVOTHYROXINE (SYNTHROID, LEVOTHROID) 112 MCG TABLET] Take 112 mcg by mouth Daily at 6:00 am.    lisinopriL (PRINIVIL,ZESTRIL) 20 MG tablet   Yes No   Sig: [LISINOPRIL (PRINIVIL,ZESTRIL) 20 MG TABLET] Take 20 mg by mouth every evening.    multivitamin with minerals (THERA-M) 9 mg iron-400 mcg Tab tablet   Yes No   Sig: [MULTIVITAMIN WITH MINERALS (THERA-M) 9 MG IRON-400 MCG TAB TABLET] Take 1 tablet by mouth daily.    omeprazole (PRILOSEC) 20 MG capsule   No No   Sig: [OMEPRAZOLE (PRILOSEC) 20 MG CAPSULE] Take 1 capsule (20 mg total) by mouth 2 (two) times a day before meals.   senna-docusate (SENNOSIDES-DOCUSATE SODIUM) 8.6-50 mg tablet   No No   Sig: [SENNA-DOCUSATE (SENNOSIDES-DOCUSATE SODIUM) 8.6-50 MG TABLET] Take 2 tablets by mouth daily. Hold for 2 or more loose stools per 24hrs      Facility-Administered Medications: None        Review of Systems    The 10 point Review of Systems is negative other than noted in the HPI or here.     Social History   I have reviewed this patient's social history and updated it with pertinent information if  needed.  Social History     Tobacco Use    Smoking status: Former    Smokeless tobacco: Never   Substance Use Topics    Alcohol use: No    Drug use: No         Family History   I have reviewed this patient's family history and updated it with pertinent information if needed.  Family History   Problem Relation Age of Onset    Glaucoma Brother         Physical Exam   Vital Signs: Temp: 97.8  F (36.6  C) Temp src: Oral BP: (!) 140/60 Pulse: 101   Resp: 20 SpO2: 93 % O2 Device: None (Room air)    Weight: 0 lbs 0 oz      General Aox3, appropriate affect, NAD, on RA  HEENT  MMM, EOMI, PERRL  Chest Adeq E b/l, No wheezing  Heart RRR, No M/R/G  Abd- Soft, NT, BS+  - Deferred,   Extremity- Moving all extremities, No digital clubbing,   No edema  Neuro- Aox3, CN II- XII intact, No peripheral vision loss  P5/5, T-N, reflexes 2+, plantar reflex downwards,  gait not checked  Skin bruising of right forehead, old bruise of the left forehead.    Swelling and contusion of the right knee. . Has no tattoo, No skin rash     Medical Decision Making       90 MINUTES SPENT BY ME on the date of service doing chart review, history, exam, documentation & further activities per the note.      ------------------ MEDICAL DECISION MAKING ------------------------------------------------------------------------------------------------------  MANAGEMENT DISCUSSED with the following over the past 24 hours:         Data   ------------------------- PAST 24 HR DATA REVIEWED -----------------------------------------------

## 2024-02-21 NOTE — MEDICATION SCRIBE - ADMISSION MEDICATION HISTORY
Medication Scribe Admission Medication History    Admission medication history is complete. The information provided in this note is only as accurate as the sources available at the time of the update.    Information Source(s): Patient via in-person    Pertinent Information: Patient states that she have all of her medication and would like to take her own medications.  Patient does not have the Duoneb with her and is requesting to have her PM dose of Duoneb.      Changes made to PTA medication list:  Added: Ipratropium-albuterol 0.5 mg/ 2.5 mg/ 3 ml nebulization (per fill history)  Deleted: None  Changed: None    Allergies reviewed with patient and updates made in EHR: yes    Medication History Completed By: Aroldo Ashraf 2/20/2024 11:09 PM    PTA Med List   Medication Sig Last Dose    allopurinoL (ZYLOPRIM) 100 MG tablet [ALLOPURINOL (ZYLOPRIM) 100 MG TABLET] Take 100 mg by mouth 2 (two) times a day.  2/20/2024 at am    aspirin (ASA) 81 MG chewable tablet Take 1 tablet (81 mg) by mouth daily 2/20/2024 at am    atorvastatin (LIPITOR) 20 MG tablet TAKE 1 TABLET (20 MG) BY MOUTH DAILY 2/20/2024 at am    calcium-vitamin D3-vitamin K (VIACTIV) 650 mg-12.5 mcg-40 mcg Chew [CALCIUM-VITAMIN D3-VITAMIN K (VIACTIV) 650 MG-12.5 MCG-40 MCG CHEW] Chew 1 tablet 2 (two) times a day.  2/20/2024 at am    clotrimazole-betamethasone (LOTRISONE) 1-0.05 % external cream Apply topically 2 times daily as needed Unknown at prn    diclofenac sodium (VOLTAREN) 1 % Gel [DICLOFENAC SODIUM (VOLTAREN) 1 % GEL] Apply 2 g topically 4 (four) times a day as needed. 2g to LUE, 4g to LLE Unknown at prn    hydrocortisone (CORTAID) 1 % external cream Apply topically 3 times daily as needed Unknown at prn    ipratropium - albuterol 0.5 mg/2.5 mg/3 mL (DUONEB) 0.5-2.5 (3) MG/3ML neb solution Take 1 vial by nebulization 2 times daily 2/20/2024 at am    levothyroxine (SYNTHROID, LEVOTHROID) 112 MCG tablet [LEVOTHYROXINE (SYNTHROID, LEVOTHROID) 112  MCG TABLET] Take 112 mcg by mouth Daily at 6:00 am.  2/20/2024 at am    lisinopriL (PRINIVIL,ZESTRIL) 20 MG tablet [LISINOPRIL (PRINIVIL,ZESTRIL) 20 MG TABLET] Take 20 mg by mouth every evening.  2/20/2024 at pm    multivitamin with minerals (THERA-M) 9 mg iron-400 mcg Tab tablet [MULTIVITAMIN WITH MINERALS (THERA-M) 9 MG IRON-400 MCG TAB TABLET] Take 1 tablet by mouth daily.  2/20/2024 at am    omeprazole (PRILOSEC) 20 MG capsule [OMEPRAZOLE (PRILOSEC) 20 MG CAPSULE] Take 1 capsule (20 mg total) by mouth 2 (two) times a day before meals. 2/20/2024 at am    senna-docusate (SENNOSIDES-DOCUSATE SODIUM) 8.6-50 mg tablet [SENNA-DOCUSATE (SENNOSIDES-DOCUSATE SODIUM) 8.6-50 MG TABLET] Take 2 tablets by mouth daily. Hold for 2 or more loose stools per 24hrs 2/20/2024 at am

## 2024-02-21 NOTE — ED TRIAGE NOTES
Pt arrives via EMS after suffering a mechanical fall at home.  Pt was ambulating in her living room when she tripped on her shoe laces resulting in a frontward fall to the carpet.  Pt has pain and bruising to her right knee, right forehead, left great toe, right hip and lower back.  Pt denies any c-spine/head tenderness.  Pt denies blood thinners.  Pt states COPD with chronic shortness of breath.  Denies chest pain.

## 2024-02-21 NOTE — PLAN OF CARE
Goal Outcome Evaluation:                      PRIMARY DIAGNOSIS: ACUTE PAIN  OUTPATIENT/OBSERVATION GOALS TO BE MET BEFORE DISCHARGE:  1. Pain Status: Improved-controlled with oral pain medications.    2. Return to near baseline physical activity: No    3. Cleared for discharge by consultants (if involved): No    Discharge Planner Nurse   Safe discharge environment identified: No  Barriers to discharge: Yes       Entered by: Marcial Lombardi RN 02/21/2024 3:14 PM     Please review provider order for any additional goals.   Nurse to notify provider when observation goals have been met and patient is ready for discharge.    Patient evaluated by PT and was referred to TCU. Patient transferred to observation unit.

## 2024-02-21 NOTE — CONSULTS
"Care Management Initial Consult    General Information  Assessment completed with: PatientSussy  Type of CM/SW Visit: Initial Assessment    Primary Care Provider verified and updated as needed: Yes   Readmission within the last 30 days: no previous admission in last 30 days      Reason for Consult: discharge planning  Advance Care Planning: Advance Care Planning Reviewed: no concerns identified          Communication Assessment  Patient's communication style: spoken language (English or Bilingual)             Cognitive  Cognitive/Neuro/Behavioral: WDL                      Living Environment:   People in home: alone     Current living Arrangements: house (\"I live in a 5 bedroom house\".)      Able to return to prior arrangements: yes       Family/Social Support:  Care provided by: self  Provides care for: no one     Children          Description of Support System: Supportive, Involved    Support Assessment: Adequate family and caregiver support, Adequate social supports, Patient communicates needs well met    Current Resources:   Patient receiving home care services: No     Community Resources: Meals on Wheels (\"I get 4 meals a week from [employer]\")  Equipment currently used at home: cane, straight, walker, rolling, raised toilet seat, grab bar, toilet (\"I use my cane most of the time. I also have a 4WW and FWW that I could use if needed\".)  Supplies currently used at home: Other (\"glasses\")    Employment/Financial:  Employment Status: retired     Employment/ Comments: \"no  history\"  Financial Concerns:     Referral to Financial Worker: No       Does the patient's insurance plan have a 3 day qualifying hospital stay waiver?  No      Functional Status:  Prior to admission patient needed assistance:   Dependent ADLs:: Ambulation-cane, Independent  Dependent IADLs:: Laundry, Meal Preparation (\"My daughter helps with laundry. I drive only short distances.\")       Mental Health Status:      " "    Chemical Dependency Status:                Values/Beliefs:  Spiritual, Cultural Beliefs, Advent Practices, Values that affect care:                 Additional Information:  Sussy lives in a house alone. She is independent with ADLs and most IADLs. \"My daughter helps with laundry. I drive only short distances. I get 4 meals a week from [employer]\".    \"I use my cane most of the time. I also have a 4WW and FWW that I could use if needed\".    She wants to go home and not to TCU. \"Daughter Grace can stay with me for a few days. I will just need warning because she lives in [place] and will need to come pick me up to stay with me. I have been in TCU before and it isn't a great experience usually.\" May need Home Care orders if recommended by PT/OT.     Daughter to transport at discharge.    CM to follow for medical progression of care, discharge recommendations, and final discharge plan.    Felicity Mcgill RN    "

## 2024-02-21 NOTE — PLAN OF CARE
Problem: Adult Inpatient Plan of Care  Goal: Absence of Hospital-Acquired Illness or Injury  Intervention: Prevent Skin Injury  Recent Flowsheet Documentation  Taken 2/21/2024 1100 by Marcial Lombardi RN  Body Position: position changed independently  Skin Protection: transparent dressing maintained  Device Skin Pressure Protection: tubing/devices free from skin contact  Intervention: Prevent Infection  Recent Flowsheet Documentation  Taken 2/21/2024 1100 by Marcial Lombardi RN  Infection Prevention: rest/sleep promoted     Problem: Risk for Delirium  Goal: Improved Behavioral Control  Intervention: Minimize Safety Risk  Recent Flowsheet Documentation  Taken 2/21/2024 1100 by Marcial Lombardi RN  Communication Enhancement Strategies:   extra time allowed for response   call light answered in person  Goal: Improved Attention and Thought Clarity  Intervention: Maximize Cognitive Function  Recent Flowsheet Documentation  Taken 2/21/2024 1100 by Marcial Lombardi RN  Sensory Stimulation Regulation: quiet environment promoted  Reorientation Measures: clock in view   PRIMARY DIAGNOSIS: ACUTE PAIN  OUTPATIENT/OBSERVATION GOALS TO BE MET BEFORE DISCHARGE:  1. Pain Status: Improved-controlled with oral pain medications.    2. Return to near baseline physical activity: No    3. Cleared for discharge by consultants (if involved): No    Discharge Planner Nurse   Safe discharge environment identified: No  Barriers to discharge: Yes       Entered by: Marcial Lombardi RN 02/21/2024 3:16 PM     Please review provider order for any additional goals.   Nurse to notify provider when observation goals have been met and patient is ready for discharge.Goal Outcome Evaluation:       Patient still needs to be evaluated by PT.

## 2024-02-21 NOTE — PROGRESS NOTES
02/21/24 1001   Appointment Info   Signing Clinician's Name / Credentials (OT) Mary Reyes,OTR/L   Living Environment   People in Home alone   Current Living Arrangements house   Home Accessibility stairs to enter home   Number of Stairs, Main Entrance 4   Stair Railings, Main Entrance railings safe and in good condition   Self-Care   Current Activity Tolerance moderate   Equipment Currently Used at Home raised toilet seat;cane, straight  (has cane and walker, walk in shower, grab bar by toilet,)   Fall history within last six months yes   Number of times patient has fallen within last six months   (number unknown)   Instrumental Activities of Daily Living (IADL)   IADL Comments indep. drsg, bathing, cooking, driving, shopping, A w/ laundry by dghtr   General Information   Onset of Illness/Injury or Date of Surgery 02/20/24   Patient/Family Therapy Goal Statement (OT) be able to move around   Additional Occupational Profile Info/Pertinent History of Current Problem Sussy Cole is a 88 year old female who with a medical history significant for history of breast cancer, hypertension, osteoporosis, CKD stage III, prior history of TIA, close compression fractures of L2, gout and other medical comorbidities who is admitted status post fall  with right knee contusion and difficulty with ambulation   Existing Precautions/Restrictions fall   Cognitive Status Examination   Orientation Status orientation to person, place and time   Visual Perception   Visual Impairment/Limitations WFL   Range of Motion Comprehensive   General Range of Motion no range of motion deficits identified   Strength Comprehensive (MMT)   General Manual Muscle Testing (MMT) Assessment no strength deficits identified   Coordination   Upper Extremity Coordination No deficits were identified   Bed Mobility   Bed Mobility sit-supine   Sit-Supine Parker (Bed Mobility) minimum assist (75% patient effort)   Transfers   Transfers toilet  transfer;bed-chair transfer   Transfer Skill: Bed to Chair/Chair to Bed   Bed-Chair Sanborn (Transfers) contact guard   Toilet Transfer   Type (Toilet Transfer) sit-stand;stand-sit   Sanborn Level (Toilet Transfer) contact guard   Balance   Balance Assessment standing dynamic balance   Balance Comments WFL w/ FWW   Activities of Daily Living   BADL Assessment/Intervention lower body dressing   Lower Body Dressing Assessment/Training   Sanborn Level (Lower Body Dressing) supervision   Clinical Impression   Criteria for Skilled Therapeutic Interventions Met (OT) Yes, treatment indicated   OT Diagnosis decreased ADLs   OT Problem List-Impairments impacting ADL balance;mobility;flexibility;pain   Assessment of Occupational Performance 1-3 Performance Deficits   Identified Performance Deficits drsg, trsfs, bed mobility   Planned Therapy Interventions (OT) ADL retraining;transfer training   Clinical Decision Making Complexity (OT) detailed assessment/moderate complexity   Risk & Benefits of therapy have been explained evaluation/treatment results reviewed;care plan/treatment goals reviewed;patient;participants voiced agreement with care plan   OT Total Evaluation Time   OT Eval, Moderate Complexity Minutes (62372) 10   OT Goals   Therapy Frequency (OT) Daily   OT Predicted Duration/Target Date for Goal Attainment 02/28/24   OT Goals Lower Body Dressing;Transfers;Bed Mobility   OT: Lower Body Dressing Supervision/stand-by assist   OT: Bed Mobility Modified independent   OT: Transfer Modified independent   Interventions   Interventions Quick Adds Self-Care/Home Management   Self-Care/Home Management   Self-Care/Home Mgmt/ADL, Compensatory, Meal Prep Minutes (56045) 20   Symptoms Noted During/After Treatment (Meal Preparation/Planning Training) increased pain;fatigue   Treatment Detail/Skilled Intervention pt at EOB, trsf bed>chair>commode w/ CGA w/ FWW, supervision for turning and A w/ line mngmt, rtn to EOB  w/CGA to sidestep to HOB, sit>supine min A, dicussed strategies for ease w/  kitchen tasks at home, call light in reach   Lower Body Dressing Training Assistance stand-by assist  (don/doff slippers)   Lower Body Dressing Training Assistance supervision   OT Discharge Planning   OT Plan trsfs, drsg, bed mobility, toileting   OT Discharge Recommendation (DC Rec) Transitional Care Facility  (vs home w/ daily assist if available, has stairs)   OT Rationale for DC Rec Ax1 for trsf and ADLs, pain level affecting ability to perform ADLs, pt lives alone w/ A from dghtr w/ laundry, unclear if family able to provide daily assist, TCU may be best option pending progress   OT Brief overview of current status CGA trsfs, pain, SBA drsg, min A bed mobility   Total Session Time   Timed Code Treatment Minutes 20   Total Session Time (sum of timed and untimed services) 30

## 2024-02-21 NOTE — PROGRESS NOTES
Respiratory Treatment Plan     Patient Score: 4  Patient Acuity: 5    Clinical Indication for Therapy: home regimen    Therapy Ordered: Duoneb BID      History:   -Smoking History: 5 years back in her 20s   -Home Medications: Duoneb BID   -Home Oxygen: no   -MARITA Hx: no      Assessment Summary: Per patient she was diagnosed with COPD during a previous hospital stay. PFTS have not been completed and she does not have a pulmonologist. Prescribed duoneb BID and has found improvement in dyspnea. She is unable to utilize inhalers due to her lack of hand strength and mobility.  Breath sounds are clear and equal bilaterally and she is currently on room air.       Dana Cueto, RT  2/21/2024

## 2024-02-22 ENCOUNTER — APPOINTMENT (OUTPATIENT)
Dept: PHYSICAL THERAPY | Facility: HOSPITAL | Age: 89
End: 2024-02-22
Payer: MEDICARE

## 2024-02-22 ENCOUNTER — APPOINTMENT (OUTPATIENT)
Dept: RADIOLOGY | Facility: HOSPITAL | Age: 89
End: 2024-02-22
Attending: EMERGENCY MEDICINE
Payer: MEDICARE

## 2024-02-22 ENCOUNTER — APPOINTMENT (OUTPATIENT)
Dept: OCCUPATIONAL THERAPY | Facility: HOSPITAL | Age: 89
End: 2024-02-22
Payer: MEDICARE

## 2024-02-22 LAB
ABO/RH(D): NORMAL
ANION GAP SERPL CALCULATED.3IONS-SCNC: 4 MMOL/L (ref 7–15)
ANTIBODY SCREEN: NEGATIVE
ATRIAL RATE - MUSE: 108 BPM
BLD PROD TYP BPU: NORMAL
BLOOD COMPONENT TYPE: NORMAL
BUN SERPL-MCNC: 28.1 MG/DL (ref 8–23)
CALCIUM SERPL-MCNC: 8.6 MG/DL (ref 8.8–10.2)
CHLORIDE SERPL-SCNC: 106 MMOL/L (ref 98–107)
CODING SYSTEM: NORMAL
CREAT SERPL-MCNC: 1.22 MG/DL (ref 0.51–0.95)
CROSSMATCH: NORMAL
DEPRECATED HCO3 PLAS-SCNC: 30 MMOL/L (ref 22–29)
DIASTOLIC BLOOD PRESSURE - MUSE: NORMAL MMHG
EGFRCR SERPLBLD CKD-EPI 2021: 43 ML/MIN/1.73M2
ERYTHROCYTE [DISTWIDTH] IN BLOOD BY AUTOMATED COUNT: 15.8 % (ref 10–15)
GLUCOSE BLDC GLUCOMTR-MCNC: 109 MG/DL (ref 70–99)
GLUCOSE SERPL-MCNC: 103 MG/DL (ref 70–99)
HCT VFR BLD AUTO: 21.4 % (ref 35–47)
HGB BLD-MCNC: 6.8 G/DL (ref 11.7–15.7)
HGB BLD-MCNC: 8.1 G/DL (ref 11.7–15.7)
HOLD SPECIMEN: NORMAL
INTERPRETATION ECG - MUSE: NORMAL
ISSUE DATE AND TIME: NORMAL
MCH RBC QN AUTO: 34.9 PG (ref 26.5–33)
MCHC RBC AUTO-ENTMCNC: 31.8 G/DL (ref 31.5–36.5)
MCV RBC AUTO: 110 FL (ref 78–100)
P AXIS - MUSE: 58 DEGREES
PLATELET # BLD AUTO: 124 10E3/UL (ref 150–450)
POTASSIUM SERPL-SCNC: 4.7 MMOL/L (ref 3.4–5.3)
PR INTERVAL - MUSE: 142 MS
QRS DURATION - MUSE: 82 MS
QT - MUSE: 322 MS
QTC - MUSE: 431 MS
R AXIS - MUSE: 6 DEGREES
RBC # BLD AUTO: 1.95 10E6/UL (ref 3.8–5.2)
SODIUM SERPL-SCNC: 140 MMOL/L (ref 135–145)
SPECIMEN EXPIRATION DATE: NORMAL
SYSTOLIC BLOOD PRESSURE - MUSE: NORMAL MMHG
T AXIS - MUSE: 18 DEGREES
TROPONIN T SERPL HS-MCNC: 24 NG/L
UNIT ABO/RH: NORMAL
UNIT NUMBER: NORMAL
UNIT STATUS: NORMAL
UNIT TYPE ISBT: 5100
VENTRICULAR RATE- MUSE: 108 BPM
WBC # BLD AUTO: 4.4 10E3/UL (ref 4–11)

## 2024-02-22 PROCEDURE — 86923 COMPATIBILITY TEST ELECTRIC: CPT

## 2024-02-22 PROCEDURE — 97530 THERAPEUTIC ACTIVITIES: CPT | Mod: GP

## 2024-02-22 PROCEDURE — 85027 COMPLETE CBC AUTOMATED: CPT | Performed by: STUDENT IN AN ORGANIZED HEALTH CARE EDUCATION/TRAINING PROGRAM

## 2024-02-22 PROCEDURE — 82962 GLUCOSE BLOOD TEST: CPT

## 2024-02-22 PROCEDURE — 80048 BASIC METABOLIC PNL TOTAL CA: CPT | Performed by: STUDENT IN AN ORGANIZED HEALTH CARE EDUCATION/TRAINING PROGRAM

## 2024-02-22 PROCEDURE — 999N000157 HC STATISTIC RCP TIME EA 10 MIN

## 2024-02-22 PROCEDURE — 73030 X-RAY EXAM OF SHOULDER: CPT | Mod: RT

## 2024-02-22 PROCEDURE — 84484 ASSAY OF TROPONIN QUANT: CPT | Performed by: EMERGENCY MEDICINE

## 2024-02-22 PROCEDURE — 86900 BLOOD TYPING SEROLOGIC ABO: CPT

## 2024-02-22 PROCEDURE — G0378 HOSPITAL OBSERVATION PER HR: HCPCS

## 2024-02-22 PROCEDURE — 99207 PR APP CREDIT; MD BILLING SHARED VISIT: CPT | Mod: FS | Performed by: EMERGENCY MEDICINE

## 2024-02-22 PROCEDURE — 99233 SBSQ HOSP IP/OBS HIGH 50: CPT | Mod: FS

## 2024-02-22 PROCEDURE — 97116 GAIT TRAINING THERAPY: CPT | Mod: GP

## 2024-02-22 PROCEDURE — 97535 SELF CARE MNGMENT TRAINING: CPT | Mod: GO

## 2024-02-22 PROCEDURE — 250N000013 HC RX MED GY IP 250 OP 250 PS 637: Performed by: INTERNAL MEDICINE

## 2024-02-22 PROCEDURE — 97110 THERAPEUTIC EXERCISES: CPT | Mod: GP

## 2024-02-22 PROCEDURE — P9016 RBC LEUKOCYTES REDUCED: HCPCS

## 2024-02-22 PROCEDURE — 97110 THERAPEUTIC EXERCISES: CPT | Mod: GO

## 2024-02-22 PROCEDURE — 258N000003 HC RX IP 258 OP 636: Performed by: EMERGENCY MEDICINE

## 2024-02-22 PROCEDURE — 94640 AIRWAY INHALATION TREATMENT: CPT

## 2024-02-22 PROCEDURE — 250N000013 HC RX MED GY IP 250 OP 250 PS 637: Performed by: STUDENT IN AN ORGANIZED HEALTH CARE EDUCATION/TRAINING PROGRAM

## 2024-02-22 PROCEDURE — 36415 COLL VENOUS BLD VENIPUNCTURE: CPT

## 2024-02-22 PROCEDURE — 36430 TRANSFUSION BLD/BLD COMPNT: CPT

## 2024-02-22 PROCEDURE — 250N000009 HC RX 250: Performed by: INTERNAL MEDICINE

## 2024-02-22 PROCEDURE — 85018 HEMOGLOBIN: CPT | Mod: 91

## 2024-02-22 PROCEDURE — 96361 HYDRATE IV INFUSION ADD-ON: CPT

## 2024-02-22 PROCEDURE — 36415 COLL VENOUS BLD VENIPUNCTURE: CPT | Performed by: STUDENT IN AN ORGANIZED HEALTH CARE EDUCATION/TRAINING PROGRAM

## 2024-02-22 RX ORDER — SODIUM CHLORIDE 9 MG/ML
INJECTION, SOLUTION INTRAVENOUS CONTINUOUS
Status: ACTIVE | OUTPATIENT
Start: 2024-02-22 | End: 2024-02-22

## 2024-02-22 RX ADMIN — ALLOPURINOL 100 MG: 100 TABLET ORAL at 20:51

## 2024-02-22 RX ADMIN — ALLOPURINOL 100 MG: 100 TABLET ORAL at 10:06

## 2024-02-22 RX ADMIN — OXYCODONE HYDROCHLORIDE 5 MG: 5 TABLET ORAL at 11:16

## 2024-02-22 RX ADMIN — Medication 1 TABLET: at 20:51

## 2024-02-22 RX ADMIN — PANTOPRAZOLE SODIUM 40 MG: 40 TABLET, DELAYED RELEASE ORAL at 16:14

## 2024-02-22 RX ADMIN — IPRATROPIUM BROMIDE AND ALBUTEROL SULFATE 3 ML: .5; 3 SOLUTION RESPIRATORY (INHALATION) at 07:50

## 2024-02-22 RX ADMIN — SENNOSIDES AND DOCUSATE SODIUM 2 TABLET: 8.6; 5 TABLET ORAL at 10:06

## 2024-02-22 RX ADMIN — PANTOPRAZOLE SODIUM 40 MG: 40 TABLET, DELAYED RELEASE ORAL at 10:08

## 2024-02-22 RX ADMIN — LEVOTHYROXINE SODIUM 112 MCG: 0.11 TABLET ORAL at 05:29

## 2024-02-22 RX ADMIN — ASPIRIN 81 MG CHEWABLE TABLET 81 MG: 81 TABLET CHEWABLE at 10:08

## 2024-02-22 RX ADMIN — SODIUM CHLORIDE: 9 INJECTION, SOLUTION INTRAVENOUS at 17:38

## 2024-02-22 RX ADMIN — Medication 1 TABLET: at 11:22

## 2024-02-22 RX ADMIN — IPRATROPIUM BROMIDE AND ALBUTEROL SULFATE 3 ML: .5; 3 SOLUTION RESPIRATORY (INHALATION) at 20:10

## 2024-02-22 RX ADMIN — ACETAMINOPHEN 650 MG: 325 TABLET ORAL at 17:37

## 2024-02-22 RX ADMIN — ACETAMINOPHEN 650 MG: 325 TABLET ORAL at 04:19

## 2024-02-22 RX ADMIN — ATORVASTATIN CALCIUM 20 MG: 10 TABLET, FILM COATED ORAL at 10:07

## 2024-02-22 ASSESSMENT — ACTIVITIES OF DAILY LIVING (ADL)
ADLS_ACUITY_SCORE: 37
ADLS_ACUITY_SCORE: 38
ADLS_ACUITY_SCORE: 38
ADLS_ACUITY_SCORE: 37
ADLS_ACUITY_SCORE: 38
ADLS_ACUITY_SCORE: 37
ADLS_ACUITY_SCORE: 38
ADLS_ACUITY_SCORE: 37

## 2024-02-22 NOTE — PROGRESS NOTES
Care Management Follow Up    Length of Stay (days): 1    Expected Discharge Date: 02/23/2024     Concerns to be Addressed: discharge planning     Patient plan of care discussed at interdisciplinary rounds: Yes    Anticipated Discharge Disposition:  home with home care vs TCU    Anticipated Discharge Services:  home RN PT OT   Anticipated Discharge DME:  n/a    Patient/family educated on Medicare website which has current facility and service quality ratings:  yes  Education Provided on the Discharge Plan:  yes  Patient/Family in Agreement with the Plan:  yes    Referrals Placed by CM/SW:    Private pay costs discussed: Not applicable    Additional Information:  Pt not agreeable to TCU at this time; pt open to having home care at discharge. Referral sent to [facility] for RN PT OT [facility] (pending). Pt reported that daughter will be staying with her to assist with needs post discharge.  3:06 PM    Brenna Kjellberg, BSW  2/22/2024

## 2024-02-22 NOTE — PROGRESS NOTES
"PRIMARY DIAGNOSIS: \"GENERIC\" NURSING  OUTPATIENT/OBSERVATION GOALS TO BE MET BEFORE DISCHARGE:  ADLs back to baseline: No    Activity and level of assistance: Assist x 1    Pain status: Improved-controlled with oral pain medications.    Return to near baseline physical activity: Yes     Discharge Planner Nurse   Safe discharge environment identified: No  Barriers to discharge: Yes       Entered by: Sony Hankins RN 02/22/2024 12:12 AM     A & O x 4, pleasant. VSS on RA. Denies pain, but reports discomfort/nausea, PRN medications given. Tele: NSR. Regular diet, tolerating well. Assist x 1, ambulated to bathroom x 1 and up to chair x 2. Family stopped by for support. L PIV SL. TCU placement pending. Nursing continue to monitor.  "

## 2024-02-22 NOTE — PROGRESS NOTES
"PRIMARY DIAGNOSIS: \"GENERIC\" NURSING  OUTPATIENT/OBSERVATION GOALS TO BE MET BEFORE DISCHARGE:  ADLs back to baseline: No    Activity and level of assistance: Assist x 1    Pain status: Pain free.    Return to near baseline physical activity: No     Discharge Planner Nurse   Safe discharge environment identified: No  Barriers to discharge: Yes         "

## 2024-02-22 NOTE — PLAN OF CARE
PRIMARY DIAGNOSIS: Right Knee Contusion  OUTPATIENT/OBSERVATION GOALS TO BE MET BEFORE DISCHARGE:  ADLs back to baseline: No    Activity and level of assistance: up 1 one assist using gait belt and a walker    Pain status: Pain improved after Tylenol and ice pack    Return to near baseline physical activity: No     Discharge Planner Nurse   Safe discharge environment identified: Yes  Barriers to discharge:  Possible discharge today        Entered by: Marley Shin RN 02/22/2024 4:56 AM     Please review provider order for any additional goals.   Nurse to notify provider when observation goals have been met and patient is ready for discharge.Goal Outcome Evaluation:       Pt c/o right knee pain and right shoulder pain. Ice pack applied on the right knee and Tylenol given with relief. Pt verbalized as long as she will not move her right shoulder there is no pain and the right knee is good a this time.Pt requested for an Xray of the right shoulder prior to discharge, left a message to attending MD. Pt is sitting comfortably in the recliner.IV out when flushed at 0400, MD on call agreed to hold IV reinsertion for now, pt is possibly discharging today

## 2024-02-22 NOTE — PROCEDURES
RN-Vascular Access Note:    S: Req for US-guided lab draw  B: Failed lab draw by phlebotomist  A: Right arm restricted.       Patient declines further poke(s) for lab(s) unless US-guided - just has blood transfusion.       Left mid-upper forearm 18G - placed under US guidance. - dressing dry and intact. Denies PIV puncture/entry site pain/tenderness. - patency checked - good/brisk aspiration and flush.  Lab drawn from current L-forearm PIV - with 5 ml waste. PIV flushed-locked with 10ml saline.   Sample handed to lab phlebotomist for processing.  R: Successful lab draw via 18G PIV.    Tristin Coles, MSN, RN [facility]  Vascular Access - [facility]

## 2024-02-22 NOTE — CARE PLAN
PRIMARY DIAGNOSIS: GENERALIZED WEAKNESS    OUTPATIENT/OBSERVATION GOALS TO BE MET BEFORE DISCHARGE  1. Orthostatic performed: No    2. Tolerating PO medications: Yes    3. Return to near baseline physical activity: No    4. Cleared for discharge by consultants (if involved): No    Discharge Planner Nurse   Safe discharge environment identified: Yes  Barriers to discharge: Yes       Entered by: Natasha White RN 02/22/2024 2:11 PM     Please review provider order for any additional goals.   Nurse to notify provider when observation goals have been met and patient is ready for discharge.    Pt A&Ox4. Denied N/V

## 2024-02-22 NOTE — PLAN OF CARE
PRIMARY DIAGNOSIS: Right Knee Contusion    OUTPATIENT/OBSERVATION GOALS TO BE MET BEFORE DISCHARGE  1. Orthostatic performed: No    2. Tolerating PO medications: Yes    3. Return to near baseline physical activity: No, assist of 1 with ADL's    4. Cleared for discharge by consultants (if involved): KAYA    Discharge Planner Nurse   Safe discharge environment identified: Yes  Barriers to discharge:  Home with assist/home PT       Entered by: Marley Shin RN 02/22/2024 2:30 AM     Please review provider order for any additional goals.   Nurse to notify provider when observation goals have been met and patient is ready for discharge.Goal Outcome Evaluation:

## 2024-02-22 NOTE — CARE PLAN
PRIMARY DIAGNOSIS: GENERALIZED WEAKNESS    OUTPATIENT/OBSERVATION GOALS TO BE MET BEFORE DISCHARGE  1. Orthostatic performed: No    2. Tolerating PO medications: Yes    3. Return to near baseline physical activity: No    4. Cleared for discharge by consultants (if involved): Yes    Discharge Planner Nurse   Safe discharge environment identified: Yes  Barriers to discharge: Yes       Entered by: Natasha White RN 02/22/2024 2:13 PM     Please review provider order for any additional goals.   Nurse to notify provider when observation goals have been met and patient is ready for discharge.    Pt A&Ox4, reported stiffness in knee.

## 2024-02-22 NOTE — UTILIZATION REVIEW
Concurrent stay review; [facility]        Under the authority of the Utilization Management Committee, the utilization review process indicated a secondary review on the above patient.  The review outcome is based on review of the medical records, discussions with staff, and applying clinical experience noted on the date of the review.        (x) Observation/outpatient Status Appropriate - Concurrent stay review       RATIONALE FOR DETERMINATION:   Ms. Cole is a 87 yo female who presented to the ED with knee pain and inability to ambulate d/t right knee contusion. Imaging negative for acute fracture. She was also noted to have mild hyperkalemia and SHIRA; both of which has resolved with treatment and IVF since hospitalization. At this time, she is clinically improved, vitals are stable, she is tolerating a diet.  Chronic anemia.  HGB did drop suspected dilutional as received IVF and her Hgb at 6.8 but no signs bleeding.  Transfused.  IVF stopped.  Anticipating discharge in AM.    Patient delayed discharge is related to disposition, there is no medical necessity for inpatient admission at the time of this review. If there is a change in patient status, please resend for review.    The information on this document is developed by the utilization review team in order for the business office to ensure compliance.  This only denotes the appropriateness of proper admission status and does not reflect the quality of care rendered.       The definitions of Inpatient Status and Observation Status used in making the determination above are those provided in the CMS Coverage Manual, Chapter 1 and Chapter 6, section 70.4.       Sincerely,    Maritza Cedeno MD

## 2024-02-22 NOTE — PROGRESS NOTES
[facility]    Medicine Progress Note - Hospitalist Service    Date of Admission:  2/20/2024    Assessment & Plan   Sussy Cole is a 88 year old female who with a medical history significant for history of breast cancer, hypertension, osteoporosis, CKD stage III, prior history of TIA, close compression fractures of L2, gout and other medical comorbidities who is admitted status post fall  with right knee contusion and difficulty with ambulation.  Patient has a history of anemia and HGB 6.8 today. Patient tachycardia: EGK obtained ST in 110.  Patient denies bloody stools or blood in vomit patient denies chest pain shortness of breath or dizziness.  1 unit of packed red blood cells transfused today and repeat hemoglobin ordered results pending.  Patient likely to discharge tomorrow if hemoglobin stabilizes and patient symptoms improved.  Patient states her knee feels better and was able to walk to bathroom.    S/p post fall  Right knee contusion   Trauma work up negative  Chronic compression fracture of the superior aspect of T1, age-indeterminate fracture of tip of left C1 transverse process  pain control as needed  Imaging right knee shows degenerative changes and soft tissue swelling  PT recommend home with assist/home PT  OT recommend TCU versus home with daily assist, will discuss with patient and daughter regarding options with assist    Addendum: Spoke with daughter Rukhsana, mentioned that her Sister Grace will be coming from [place] tomorrow and she will be living with Sussy for sometime. Will plan for discharge tomorrow with daughter    SHIRA on CKD - improving  Hyperkalemia - resolved  S/p calcium gluconate, D50, insulin, bicarb, nebulizers   Creatinine trending up 1.22 today increased from 1.14 yesterday  Potassium 4.7 today  Trend BMP in AM  Avoid nephrotoxic drugs    Elevated troponin likely secondary to demand  Downtrending  Denies chest pain, EKG with no acute ischemic  "changes  Repeat EKG this AM for tachycardia 110 - 115, Reviewed EKG Sinus tachycardia     Chronic anemia, macrocytic  Hgb 6.8 today. Trending down from 7.4  Has effusions around the right knee joint  No evidence of bleeding  Will monitor hemoglobin every 4 hours   Trend CBC in AM    Hypertension  hold lisinopril.due to SHIRA     Hypothyroidism  Synthroid    Gout  continue allopurinol    Sensorineural hearing loss of both ears       Observation Goals: -diagnostic tests and consults completed and resulted, -vital signs normal or at patient baseline, -tolerating oral intake to maintain hydration, -adequate pain control on oral analgesics, -tolerating oral antibiotics or has plans for home infusion setup, -infection is improving, -dyspnea improved and O2 sats greater than 88% on room air or prior home oxygen levels, -returns to baseline functional status, -safe disposition plan has been identified, Nurse to notify provider when observation goals have been met and patient is ready for discharge.  Diet: Regular Diet Adult    DVT Prophylaxis: Ambulate every shift  Jenkins Catheter: Not present  Lines: None     Cardiac Monitoring: ACTIVE order. Indication: Tachyarrhythmias, acute (48 hours)  Code Status: Full Code      Clinically Significant Risk Factors Present on Admission        # Hyperkalemia: Highest K = 6.3 mmol/L in last 2 days, will monitor as appropriate         # Drug Induced Platelet Defect: home medication list includes an antiplatelet medication   # Hypertension: Noted on problem list      # Obesity: Estimated body mass index is 33.83 kg/m  as calculated from the following:    Height as of this encounter: 1.575 m (5' 2.01\").    Weight as of this encounter: 83.9 kg (185 lb).              Disposition Plan      Expected Discharge Date: 02/23/2024        Discharge Comments: Charge home tomorrow with daughter Grace  Will be coming from [place] tomorrow          The patient's care was discussed with the Attending " Physician, Dr. Torres .    Chapis Iraheta NP  Hospitalist Service  [facility]  Securely message with [employer] (more info)  Text page via [employer] Paging/Directory   ______________________________________________________________________    Interval History   Patient HGB dropped to 6.8 this morning.   Patient denies Blood in stool or vomiting blood.  Patient denies chest pain, SOB, or dizziness.  EKG for tachycardia , sinus tachycardia  Patient states she has a history of anemia and needing blood transfusion  Patient consented for blood transfusion  1 unit PRBC transfused without complications  Shoulder xray obtained for right shoulder pain after fall. Negative for new fracture  Monitor hemoglobin every 4 hours trend CBC in a.m.       Physical Exam   Vital Signs: Temp: 97.9  F (36.6  C) Temp src: Oral BP: 119/56 Pulse: 97   Resp: 18 SpO2: 99 % O2 Device: None (Room air)    Weight: 185 lbs 0 oz    Constitutional: awake, alert, cooperative, no apparent distress, and appears stated age  Hematologic / Lymphatic: no cervical lymphadenopathy and no supraclavicular lymphadenopathy  Respiratory: No increased work of breathing, good air exchange, clear to auscultation bilaterally, no crackles or wheezing  Cardiovascular: Normal apical impulse, regular rate and rhythm, normal S1 and S2, no S3 or S4, and no murmur noted  GI: No scars, normal bowel sounds, soft, non-distended, non-tender, no masses palpated, no hepatosplenomegally  Skin:  large bruising and ecchymosis just left knee, +CMS LLE, normal skin color, texture, turgor, no redness, warmth, or swelling, no rashes, and no lesions  Musculoskeletal: There is no redness, warmth, or swelling of the joints.  Full range of motion noted.  Motor strength is 5 out of 5 all extremities bilaterally.  Tone is normal.  Neurologic: Awake, alert, oriented to name, place and time.  Neuropsychiatric: General: normal, calm, and normal eye contact    Medical  Decision Making       50 MINUTES SPENT BY ME on the date of service doing chart review, history, exam, documentation & further activities per the note.      Data     I have personally reviewed the following data over the past 24 hrs:    4.4  \   6.8 (LL)   / 124 (L)     140 106 28.1 (H) /  103 (H)   4.7 30 (H) 1.22 (H) \     Trop: 24 (H) BNP: N/A       Imaging results reviewed over the past 24 hrs:   Recent Results (from the past 24 hour(s))   XR Shoulder Right 2 Views    Narrative    EXAM: XR SHOULDER RIGHT 2 VIEWS  LOCATION: [facility]  DATE: 2/22/2024    INDICATION: pain  COMPARISON: None.      Impression    IMPRESSION: No evidence for acute fracture but there is posttraumatic deformity to the proximal humerus which is likely secondary to an old injury. Inferior subluxation of the humeral head relative to the glenoid but no true dislocation. Hypertrophic   change at the AC joint.

## 2024-02-22 NOTE — CARE PLAN
PRIMARY Concern: Fall, right knee injury  SAFETY RISK Concerns (fall risk, behaviors, etc.): fall risk   Isolation/Type: none  Tests/Procedures for NEXT shift: TBD  Consults? (Pending/following, signed-off?) pt/ot  Other Important info for NEXT shift: water without ice, ice on knee  Anticipated DC date & active delays: TBD  __________________________________________________________  Orientation/Cognitive: A&Ox4  Mobility Level/Assist Equipment: A1-2, GB/W  Antibiotics & Plan (IV/po, length of tx left): none  Pain Management: oxy PO  Tele/VS/O2: VSS, RA  ABNL Lab/BG: Hgb 6.8, blood given  Diet: regular  Bowel/Bladder: up to toilet  Skin Concerns: scattered bruising, laceration on face  Drains/Devices: IV

## 2024-02-23 ENCOUNTER — APPOINTMENT (OUTPATIENT)
Dept: OCCUPATIONAL THERAPY | Facility: HOSPITAL | Age: 89
End: 2024-02-23
Payer: MEDICARE

## 2024-02-23 ENCOUNTER — APPOINTMENT (OUTPATIENT)
Dept: PHYSICAL THERAPY | Facility: HOSPITAL | Age: 89
End: 2024-02-23
Payer: MEDICARE

## 2024-02-23 VITALS
TEMPERATURE: 98.6 F | SYSTOLIC BLOOD PRESSURE: 161 MMHG | BODY MASS INDEX: 34.04 KG/M2 | OXYGEN SATURATION: 94 % | WEIGHT: 185 LBS | RESPIRATION RATE: 18 BRPM | DIASTOLIC BLOOD PRESSURE: 73 MMHG | HEIGHT: 62 IN | HEART RATE: 108 BPM

## 2024-02-23 LAB
ANION GAP SERPL CALCULATED.3IONS-SCNC: 5 MMOL/L (ref 7–15)
BUN SERPL-MCNC: 27.6 MG/DL (ref 8–23)
CALCIUM SERPL-MCNC: 8.8 MG/DL (ref 8.8–10.2)
CHLORIDE SERPL-SCNC: 106 MMOL/L (ref 98–107)
CREAT SERPL-MCNC: 1.25 MG/DL (ref 0.51–0.95)
DEPRECATED HCO3 PLAS-SCNC: 29 MMOL/L (ref 22–29)
EGFRCR SERPLBLD CKD-EPI 2021: 42 ML/MIN/1.73M2
ERYTHROCYTE [DISTWIDTH] IN BLOOD BY AUTOMATED COUNT: 18.1 % (ref 10–15)
GLUCOSE BLDC GLUCOMTR-MCNC: 109 MG/DL (ref 70–99)
GLUCOSE SERPL-MCNC: 102 MG/DL (ref 70–99)
HCT VFR BLD AUTO: 25.2 % (ref 35–47)
HGB BLD-MCNC: 8.1 G/DL (ref 11.7–15.7)
MCH RBC QN AUTO: 34.2 PG (ref 26.5–33)
MCHC RBC AUTO-ENTMCNC: 32.1 G/DL (ref 31.5–36.5)
MCV RBC AUTO: 106 FL (ref 78–100)
PLATELET # BLD AUTO: 138 10E3/UL (ref 150–450)
POTASSIUM SERPL-SCNC: 4.8 MMOL/L (ref 3.4–5.3)
RBC # BLD AUTO: 2.37 10E6/UL (ref 3.8–5.2)
SODIUM SERPL-SCNC: 140 MMOL/L (ref 135–145)
WBC # BLD AUTO: 4.9 10E3/UL (ref 4–11)

## 2024-02-23 PROCEDURE — 96361 HYDRATE IV INFUSION ADD-ON: CPT

## 2024-02-23 PROCEDURE — G0378 HOSPITAL OBSERVATION PER HR: HCPCS

## 2024-02-23 PROCEDURE — 999N000157 HC STATISTIC RCP TIME EA 10 MIN

## 2024-02-23 PROCEDURE — 250N000009 HC RX 250: Performed by: INTERNAL MEDICINE

## 2024-02-23 PROCEDURE — 80048 BASIC METABOLIC PNL TOTAL CA: CPT

## 2024-02-23 PROCEDURE — 250N000013 HC RX MED GY IP 250 OP 250 PS 637: Performed by: STUDENT IN AN ORGANIZED HEALTH CARE EDUCATION/TRAINING PROGRAM

## 2024-02-23 PROCEDURE — 97116 GAIT TRAINING THERAPY: CPT | Mod: GP

## 2024-02-23 PROCEDURE — 97110 THERAPEUTIC EXERCISES: CPT | Mod: GO

## 2024-02-23 PROCEDURE — 97535 SELF CARE MNGMENT TRAINING: CPT | Mod: GO

## 2024-02-23 PROCEDURE — 99239 HOSP IP/OBS DSCHRG MGMT >30: CPT | Mod: FS

## 2024-02-23 PROCEDURE — 82962 GLUCOSE BLOOD TEST: CPT

## 2024-02-23 PROCEDURE — 94640 AIRWAY INHALATION TREATMENT: CPT

## 2024-02-23 PROCEDURE — 85027 COMPLETE CBC AUTOMATED: CPT

## 2024-02-23 PROCEDURE — 250N000013 HC RX MED GY IP 250 OP 250 PS 637: Performed by: INTERNAL MEDICINE

## 2024-02-23 PROCEDURE — 36415 COLL VENOUS BLD VENIPUNCTURE: CPT

## 2024-02-23 PROCEDURE — 99207 PR APP CREDIT; MD BILLING SHARED VISIT: CPT | Mod: FS | Performed by: HOSPITALIST

## 2024-02-23 PROCEDURE — 97530 THERAPEUTIC ACTIVITIES: CPT | Mod: GP

## 2024-02-23 RX ORDER — OXYCODONE HYDROCHLORIDE 5 MG/1
2.5 TABLET ORAL EVERY 4 HOURS PRN
Qty: 12 TABLET | Refills: 0 | Status: SHIPPED | OUTPATIENT
Start: 2024-02-23 | End: 2024-02-27

## 2024-02-23 RX ORDER — ACETAMINOPHEN 325 MG/1
650 TABLET ORAL 3 TIMES DAILY
Qty: 18 TABLET | Refills: 0 | Status: SHIPPED | OUTPATIENT
Start: 2024-02-23 | End: 2024-02-26

## 2024-02-23 RX ADMIN — ASPIRIN 81 MG CHEWABLE TABLET 81 MG: 81 TABLET CHEWABLE at 07:55

## 2024-02-23 RX ADMIN — IPRATROPIUM BROMIDE AND ALBUTEROL SULFATE 3 ML: .5; 3 SOLUTION RESPIRATORY (INHALATION) at 09:10

## 2024-02-23 RX ADMIN — Medication 1 TABLET: at 11:43

## 2024-02-23 RX ADMIN — ATORVASTATIN CALCIUM 20 MG: 10 TABLET, FILM COATED ORAL at 07:52

## 2024-02-23 RX ADMIN — PANTOPRAZOLE SODIUM 40 MG: 40 TABLET, DELAYED RELEASE ORAL at 07:51

## 2024-02-23 RX ADMIN — OXYCODONE HYDROCHLORIDE 2.5 MG: 5 TABLET ORAL at 09:29

## 2024-02-23 RX ADMIN — SENNOSIDES AND DOCUSATE SODIUM 2 TABLET: 8.6; 5 TABLET ORAL at 07:51

## 2024-02-23 RX ADMIN — OXYCODONE HYDROCHLORIDE 2.5 MG: 5 TABLET ORAL at 14:40

## 2024-02-23 RX ADMIN — LEVOTHYROXINE SODIUM 112 MCG: 0.11 TABLET ORAL at 07:51

## 2024-02-23 RX ADMIN — ALLOPURINOL 100 MG: 100 TABLET ORAL at 07:52

## 2024-02-23 ASSESSMENT — ACTIVITIES OF DAILY LIVING (ADL)
ADLS_ACUITY_SCORE: 37
ADLS_ACUITY_SCORE: 36
ADLS_ACUITY_SCORE: 37
ADLS_ACUITY_SCORE: 36
ADLS_ACUITY_SCORE: 37
ADLS_ACUITY_SCORE: 37
ADLS_ACUITY_SCORE: 36
ADLS_ACUITY_SCORE: 36
ADLS_ACUITY_SCORE: 37
ADLS_ACUITY_SCORE: 36
ADLS_ACUITY_SCORE: 37
ADLS_ACUITY_SCORE: 36

## 2024-02-23 NOTE — PROGRESS NOTES
"PRIMARY DIAGNOSIS: \"GENERIC\" NURSING  OUTPATIENT/OBSERVATION GOALS TO BE MET BEFORE DISCHARGE:  ADLs back to baseline: No    Activity and level of assistance: Assist x 1.    Pain status: Improved-controlled with oral pain medications.    Return to near baseline physical activity: Yes     Discharge Planner Nurse   Safe discharge environment identified: Yes  Barriers to discharge: Yes       Entered by: Adolfo Pabon RN 02/23/2024 8:14 AM     Pt still tachy. CTM. Slept well throughout the night.    Adolfo Pabon RN    "

## 2024-02-23 NOTE — PLAN OF CARE
Physical Therapy Discharge Summary    Reason for therapy discharge:    Discharged to home with home therapy.    Progress towards therapy goal(s). See goals on Care Plan in [facility] electronic health record for goal details.  Goals partially met.  Barriers to achieving goals:   discharge from facility.    Therapy recommendation(s):    Continued therapy is recommended.  Rationale/Recommendations:  Continue with home PT.

## 2024-02-23 NOTE — PROGRESS NOTES
"PRIMARY DIAGNOSIS: \"GENERIC\" NURSING  OUTPATIENT/OBSERVATION GOALS TO BE MET BEFORE DISCHARGE:  ADLs back to baseline: No    Activity and level of assistance: Up with standby assistance.    Pain status: Improved-controlled with oral pain medications.    Return to near baseline physical activity: Yes     Discharge Planner Nurse   Safe discharge environment identified: Yes  Barriers to discharge: Yes       Entered by: Adolfo Pabon RN 02/23/2024 8:16 AM     Slept well throughout the night.    Adolfo Pabon RN    "

## 2024-02-23 NOTE — PROGRESS NOTES
"PRIMARY DIAGNOSIS: \"GENERIC\" NURSING  OUTPATIENT/OBSERVATION GOALS TO BE MET BEFORE DISCHARGE:  ADLs back to baseline: Yes    Activity and level of assistance: Up with standby assistance.    Pain status: Improved-controlled with oral pain medications.    Return to near baseline physical activity: Yes     Discharge Planner Nurse   Safe discharge environment identified: Yes  Barriers to discharge: No       Entered by: Oxana Giraldo RN 02/23/2024 11:00 AM   Pt A&O x 4, assist of 1. C/o pain right knee 5/10. PRN Oxycodone 2.5 mg given and was effective per patient. Wrapped right knee with ace wrap. . Up walking with PT today. Sitting up in chair. Tele: Sinus-tachy . Pt to discharge later today.   Please review provider order for any additional goals.   Nurse to notify provider when observation goals have been met and patient is ready for discharge.  "

## 2024-02-23 NOTE — PROGRESS NOTES
"PRIMARY DIAGNOSIS: \"GENERIC\" NURSING  OUTPATIENT/OBSERVATION GOALS TO BE MET BEFORE DISCHARGE:  ADLs back to baseline: No    Activity and level of assistance: Assist x 1    Pain status: Improved-controlled with oral pain medications.    Return to near baseline physical activity: Yes     Discharge Planner Nurse   Safe discharge environment identified: Yes  Barriers to discharge: Yes       Entered by: Sony Hankins RN 02/23/2024 12:00 AM     A & O x 4, pleasant. VSS on RA. Pain addressed with PRN medication. Tele: NSR. Regular diet, tolerating well. Assist x 1 w/ gait belt, walker. NS infusing @ 100 mL/hr via L PIV. Up in chair for supper, ambulated to bathroom x 1. Nursing continue to monitor.  "

## 2024-02-23 NOTE — PROGRESS NOTES
Patient discharged to home at 1445 via Private Car.  Accompanied by daughter and staff.  Discharge instructions were reviewed with patient, opportunity offered to ask questions.    Prescriptions e-prescribed to pharmacy.  Access discontinued: Yes  Care plan and education discontinued: Yes  Belongings were sent home with patient/family:  Cell phone/electronics:  , Clothing: Shirt(s):  , Pants:  , and Outerwear:  , Purse/Wallet: cane, and Shoes:   .

## 2024-02-23 NOTE — PLAN OF CARE
Occupational Therapy Discharge Summary    Reason for therapy discharge:    Discharging home.    Progress towards therapy goal(s). See goals on Care Plan in [facility] electronic health record for goal details.  Progressing towards goals.    Therapy recommendation(s):    Pt declines TCU-discharging home w/her dtr staying w/her initially; rec. Home OT.

## 2024-02-23 NOTE — DISCHARGE SUMMARY
"[facility]  Hospitalist Discharge Summary      Date of Admission:  2/20/2024  Date of Discharge:  2/23/2024  Discharging Provider: Chapis Iraheta NP  Discharge Service: Hospitalist Service    Discharge Diagnoses   Contusion right knee  Fall  Chronic anemia macrocytic  SHIRA on CKD improved    Clinically Significant Risk Factors     # Obesity: Estimated body mass index is 33.83 kg/m  as calculated from the following:    Height as of this encounter: 1.575 m (5' 2.01\").    Weight as of this encounter: 83.9 kg (185 lb).       Follow-ups Needed After Discharge       Discharge Disposition   Discharged to home  Condition at discharge: Stable    Hospital Course   Sussy Cole is a 88 year old female who with a medical history significant for history of breast cancer, hypertension, osteoporosis, CKD stage III, prior history of TIA, close compression fractures of L2, gout and other medical comorbidities who is admitted status post fall  with right knee contusion and difficulty with ambulation.  Patient has a history of anemia and HGB 6.8 today. Patient tachycardia: EGK obtained ST in 110.  Patient denies bloody stools or blood in vomit patient denies chest pain shortness of breath or dizziness.  1 unit of packed red blood cells transfused yesterday and repeat hemoglobin ordered today is 8.0.  Patient to discharge today with daughter.  Patient denies chest pain, SOB, or dizziness.  Patient states she feels well and would like to discharge to home with daughter. Patient states her knee feels better and was able to walk to bathroom.      #S/p post fall  #Right knee contusion   Trauma work up negative  Chronic compression fracture of the superior aspect of T1, age-indeterminate fracture of tip of left C1 transverse process  pain control as needed  Imaging right knee shows degenerative changes and soft tissue swelling  PT recommend home with assist/home PT  OT recommend home with daily assist, Addendum: " Spoke with daughter Rukhsana, mentioned that her Sister Grace will be coming from [place] tomorrow and she will be living with [facility] for sometime. Will plan for discharge today with daughter    #SHIRA on CKD -   #Hyperkalemia - resolved  S/p calcium gluconate, D50, insulin, bicarb, nebulizers  Creatinine on 12/24/21 was 1.68   Creatinine trending up 1.25 today increased from 1.22 yesterday  Potassium 4.7 today  Follow up outpatient recheck BMP in 1 week      #Elevated troponin likely secondary to demand  Downtrending  Denies chest pain, EKG with no acute ischemic changes  Repeat EKG this AM for tachycardia 110 - 115, Reviewed EKG Sinus tachycardia     #Chronic anemia, macrocytic  Hgb 6.8 yesterday. Trending down from 7.4  Transfused 1 unit of PRBC yesterday  Repeat Hgb 8.0 today  Has effusions around the right knee joint  No evidence of bleeding  Follow up outpatient recheck CBC in 1 week    Hypertension  hold lisinopril.due to SHIRA     Hypothyroidism  Synthroid    Gout  continue allopurinol    Sensorineural hearing loss of both ears    Consultations This Hospital Stay   PHYSICAL THERAPY ADULT IP CONSULT  PHYSICAL THERAPY ADULT IP CONSULT  OCCUPATIONAL THERAPY ADULT IP CONSULT  CARE MANAGEMENT / SOCIAL WORK IP CONSULT    Code Status   Full Code    Time Spent on this Encounter   I, Chapis Iraheta NP, personally saw the patient today and spent greater than 30 minutes discharging this patient.       Chapis Iraheta NP  [facility] EXTENDED RECOVERY AND SHORT STAY  [address] [place] [id number]  Phone: [phone]  Fax: [phone]  ______________________________________________________________________    Physical Exam   Vital Signs: Temp: 98.5  F (36.9  C) Temp src: Oral BP: 121/62 Pulse: 108   Resp: 16 SpO2: 94 % O2 Device: None (Room air)    Weight: 185 lbs 0 oz  Constitutional: awake, alert, cooperative, no apparent distress, and appears stated age  Hematologic / Lymphatic: no  cervical lymphadenopathy and no supraclavicular lymphadenopathy  Respiratory: No increased work of breathing, good air exchange, clear to auscultation bilaterally, no crackles or wheezing  Cardiovascular: Normal apical impulse, regular rate and rhythm, normal S1 and S2, no S3 or S4, and no murmur noted  GI: No scars, normal bowel sounds, soft, non-distended, non-tender, no masses palpated, no hepatosplenomegally  Skin: no bruising or bleeding, normal skin color, texture, turgor, no redness, warmth, or swelling, no rashes, and no lesions  Musculoskeletal: There is no redness, warmth, or swelling of the joints.  Full range of motion noted.  Motor strength is 5 out of 5 all extremities bilaterally.  Tone is normal.  Neurologic: Awake, alert, oriented to name, place and time.    Neuropsychiatric: General: normal, calm, and normal eye contact       Primary Care Physician   Bobby Quiroga    Discharge Orders      Physical Therapy  Referral      Occupational Therapy  Referral      Reason for your hospital stay    Knee Contusion  Anemia, macrocytic  Fall     Follow-up and recommended labs and tests     Follow up with primary care provider, Bobby Quiroga, within 7 days for hospital follow- up.  The following labs/tests are recommended: Outpatient labs repeat BMP for creatinine and Repeat HGB follow up with primary care in 1 week.     Follow up with Oncology as scheduled in 4 weeks. Call and let them know you were seen in hospital and needed transfusion and see if they want to see you sooner than your scheduled appointment  Resume Iron as ordered by Oncology     Activity    Your activity upon discharge: activity as tolerated     Discharge Instructions    Watch for signs and symptoms of blood clot: Swelling, pain, redness, or skin warm to the touch     Diet    Follow this diet upon discharge: Orders Placed This Encounter      Regular Diet Adult       Significant Results and Procedures   Most Recent 3  CBC's:  Recent Labs   Lab Test 02/23/24  0808 02/22/24  1509 02/22/24  0739 02/21/24  0716   WBC 4.9  --  4.4 5.0   HGB 8.1* 8.1* 6.8* 7.4*   *  --  110* 110*   *  --  124* 134*     Most Recent 3 BMP's:  Recent Labs   Lab Test 02/23/24  0808 02/23/24  0741 02/22/24  1721 02/22/24  0739 02/21/24  0717 02/21/24  0716     --   --  140  --  143   POTASSIUM 4.8  --   --  4.7  --  4.6   CHLORIDE 106  --   --  106  --  109*   CO2 29  --   --  30*  --  26   BUN 27.6*  --   --  28.1*  --  32.0*   CR 1.25*  --   --  1.22*  --  1.14*   ANIONGAP 5*  --   --  4*  --  8   CARMELA 8.8  --   --  8.6*  --  8.9   * 109* 109* 103*   < > 95    < > = values in this interval not displayed.   ,   Results for orders placed or performed during the hospital encounter of 02/20/24   Head CT w/o contrast    Narrative    EXAM: CT HEAD W/O CONTRAST, CT CERVICAL SPINE W/O CONTRAST, CT FACIAL BONES WITHOUT CONTRAST  LOCATION: [facility]  DATE: 2/20/2024    INDICATION: Head, face, and neck injury  COMPARISON: None.  TECHNIQUE:   1) Routine CT Head without IV contrast. Multiplanar reformats. Dose reduction techniques were used.  2) Routine CT Facial Bones without IV contrast. Multiplanar reformats. Dose reduction techniques were used.  3) Routine CT Cervical Spine without IV contrast. Multiplanar reformats. Dose reduction techniques were used.    FINDINGS:  HEAD CT:   INTRACRANIAL CONTENTS: No intracranial hemorrhage, extraaxial collection, or mass effect.  No CT evidence of acute infarct. Mild presumed chronic small vessel ischemic changes. Mild generalized volume loss. No hydrocephalus.     OSSEOUS STRUCTURES/SOFT TISSUES: No significant abnormality.     FACIAL BONE CT:  OSSEOUS STRUCTURES/SOFT TISSUES: No localized soft tissue swelling/inflammation. No facial bone fracture or malalignment. No evidence for dental trauma or periapical abscess.    ORBITAL CONTENTS: No acute abnormality.    SINUSES:  Small amount of fluid right maxillary sinus.    CERVICAL SPINE CT:   VERTEBRA: Mild chronic compression superior aspect of T1; similar to CTA head and neck 12/24/2021. Fracture at the tip of the left C1 transverse process; age indeterminate.    CANAL/FORAMINA: Mild degenerative changes with mild canal narrowing C6-C7. Multilevel mild neural foraminal narrowing.    PARASPINAL: No extraspinal abnormality. Visualized lung fields are clear.      Impression    IMPRESSION:  HEAD CT:  1.  No acute intracranial process.    FACIAL BONE CT:  1.  No facial bone or mandibular fracture.    CERVICAL SPINE CT:  1.  Mild chronic compression superior aspect of T1; similar to CTA head and neck 12/24/2021.   2.  Fracture at the tip of the left C1 transverse process; age indeterminate.   CT Facial Bones without Contrast    Narrative    EXAM: CT HEAD W/O CONTRAST, CT CERVICAL SPINE W/O CONTRAST, CT FACIAL BONES WITHOUT CONTRAST  LOCATION: [facility]  DATE: 2/20/2024    INDICATION: Head, face, and neck injury  COMPARISON: None.  TECHNIQUE:   1) Routine CT Head without IV contrast. Multiplanar reformats. Dose reduction techniques were used.  2) Routine CT Facial Bones without IV contrast. Multiplanar reformats. Dose reduction techniques were used.  3) Routine CT Cervical Spine without IV contrast. Multiplanar reformats. Dose reduction techniques were used.    FINDINGS:  HEAD CT:   INTRACRANIAL CONTENTS: No intracranial hemorrhage, extraaxial collection, or mass effect.  No CT evidence of acute infarct. Mild presumed chronic small vessel ischemic changes. Mild generalized volume loss. No hydrocephalus.     OSSEOUS STRUCTURES/SOFT TISSUES: No significant abnormality.     FACIAL BONE CT:  OSSEOUS STRUCTURES/SOFT TISSUES: No localized soft tissue swelling/inflammation. No facial bone fracture or malalignment. No evidence for dental trauma or periapical abscess.    ORBITAL CONTENTS: No acute abnormality.    SINUSES:  Small amount of fluid right maxillary sinus.    CERVICAL SPINE CT:   VERTEBRA: Mild chronic compression superior aspect of T1; similar to CTA head and neck 12/24/2021. Fracture at the tip of the left C1 transverse process; age indeterminate.    CANAL/FORAMINA: Mild degenerative changes with mild canal narrowing C6-C7. Multilevel mild neural foraminal narrowing.    PARASPINAL: No extraspinal abnormality. Visualized lung fields are clear.      Impression    IMPRESSION:  HEAD CT:  1.  No acute intracranial process.    FACIAL BONE CT:  1.  No facial bone or mandibular fracture.    CERVICAL SPINE CT:  1.  Mild chronic compression superior aspect of T1; similar to CTA head and neck 12/24/2021.   2.  Fracture at the tip of the left C1 transverse process; age indeterminate.   CT Cervical Spine w/o Contrast    Narrative    EXAM: CT HEAD W/O CONTRAST, CT CERVICAL SPINE W/O CONTRAST, CT FACIAL BONES WITHOUT CONTRAST  LOCATION: [facility]  DATE: 2/20/2024    INDICATION: Head, face, and neck injury  COMPARISON: None.  TECHNIQUE:   1) Routine CT Head without IV contrast. Multiplanar reformats. Dose reduction techniques were used.  2) Routine CT Facial Bones without IV contrast. Multiplanar reformats. Dose reduction techniques were used.  3) Routine CT Cervical Spine without IV contrast. Multiplanar reformats. Dose reduction techniques were used.    FINDINGS:  HEAD CT:   INTRACRANIAL CONTENTS: No intracranial hemorrhage, extraaxial collection, or mass effect.  No CT evidence of acute infarct. Mild presumed chronic small vessel ischemic changes. Mild generalized volume loss. No hydrocephalus.     OSSEOUS STRUCTURES/SOFT TISSUES: No significant abnormality.     FACIAL BONE CT:  OSSEOUS STRUCTURES/SOFT TISSUES: No localized soft tissue swelling/inflammation. No facial bone fracture or malalignment. No evidence for dental trauma or periapical abscess.    ORBITAL CONTENTS: No acute abnormality.    SINUSES:  Small amount of fluid right maxillary sinus.    CERVICAL SPINE CT:   VERTEBRA: Mild chronic compression superior aspect of T1; similar to CTA head and neck 12/24/2021. Fracture at the tip of the left C1 transverse process; age indeterminate.    CANAL/FORAMINA: Mild degenerative changes with mild canal narrowing C6-C7. Multilevel mild neural foraminal narrowing.    PARASPINAL: No extraspinal abnormality. Visualized lung fields are clear.      Impression    IMPRESSION:  HEAD CT:  1.  No acute intracranial process.    FACIAL BONE CT:  1.  No facial bone or mandibular fracture.    CERVICAL SPINE CT:  1.  Mild chronic compression superior aspect of T1; similar to CTA head and neck 12/24/2021.   2.  Fracture at the tip of the left C1 transverse process; age indeterminate.   XR Pelvis and Hip Right 2 Views    Narrative    EXAM: XR PELVIS AND HIP RIGHT 2 VIEWS  LOCATION: [facility]  DATE: 2/20/2024    INDICATION: Pain. Recent fall.  COMPARISON: None.      Impression    IMPRESSION: No fracture. Mild degenerative changes in both hips. Osteopenia.   XR Knee Right 1/2 Views    Narrative    EXAM: XR KNEE RIGHT 1/2 VIEWS  LOCATION: [facility]  DATE: 2/20/2024    INDICATION: Knee pain. Recent fall.  COMPARISON: None.      Impression    IMPRESSION: No fracture. Osteopenia. Advanced degenerative changes in the medial compartment with mild degenerative changes in the lateral compartment. Moderate soft tissue swelling along the anterior margin of the patella, distal quadriceps tendon as   well as the proximal tibia.   XR Shoulder Right 2 Views    Narrative    EXAM: XR SHOULDER RIGHT 2 VIEWS  LOCATION: [facility]  DATE: 2/22/2024    INDICATION: pain  COMPARISON: None.      Impression    IMPRESSION: No evidence for acute fracture but there is posttraumatic deformity to the proximal humerus which is likely secondary to an old injury. Inferior subluxation of the  humeral head relative to the glenoid but no true dislocation. Hypertrophic   change at the AC joint.       Discharge Medications   Current Discharge Medication List        START taking these medications    Details   acetaminophen (TYLENOL) 325 MG tablet Take 2 tablets (650 mg) by mouth 3 times daily for 3 days  Qty: 18 tablet, Refills: 0    Associated Diagnoses: Contusion of right knee, initial encounter; Malignant neoplasm of female breast, unspecified estrogen receptor status, unspecified laterality, unspecified site of breast (H)      oxyCODONE (ROXICODONE) 5 MG tablet Take 0.5 tablets (2.5 mg) by mouth every 4 hours as needed for moderate pain  Qty: 12 tablet, Refills: 0    Associated Diagnoses: Contusion of right knee, initial encounter           CONTINUE these medications which have NOT CHANGED    Details   allopurinoL (ZYLOPRIM) 100 MG tablet [ALLOPURINOL (ZYLOPRIM) 100 MG TABLET] Take 100 mg by mouth 2 (two) times a day.       aspirin (ASA) 81 MG chewable tablet Take 1 tablet (81 mg) by mouth daily  Qty: 90 tablet, Refills: 0    Associated Diagnoses: TIA (transient ischemic attack); Vertebrobasilar insufficiency; Intracranial atherosclerosis      atorvastatin (LIPITOR) 20 MG tablet TAKE 1 TABLET (20 MG) BY MOUTH DAILY  Qty: 90 tablet, Refills: 0    Comments: Future refills should be requested of PCP Rosalee Bravo with [facility]  Associated Diagnoses: TIA (transient ischemic attack); Vertebrobasilar insufficiency; Intracranial atherosclerosis      calcium-vitamin D3-vitamin K (VIACTIV) 650 mg-12.5 mcg-40 mcg Chew [CALCIUM-VITAMIN D3-VITAMIN K (VIACTIV) 650 MG-12.5 MCG-40 MCG CHEW] Chew 1 tablet 2 (two) times a day.       clotrimazole-betamethasone (LOTRISONE) 1-0.05 % external cream Apply topically 2 times daily as needed      diclofenac sodium (VOLTAREN) 1 % Gel [DICLOFENAC SODIUM (VOLTAREN) 1 % GEL] Apply 2 g topically 4 (four) times a day as needed. 2g to LUE, 4g to LLE  Refills: 0    Associated Diagnoses:  "Pain      hydrocortisone (CORTAID) 1 % external cream Apply topically 3 times daily as needed      ipratropium - albuterol 0.5 mg/2.5 mg/3 mL (DUONEB) 0.5-2.5 (3) MG/3ML neb solution Take 1 vial by nebulization 2 times daily      levothyroxine (SYNTHROID, LEVOTHROID) 112 MCG tablet [LEVOTHYROXINE (SYNTHROID, LEVOTHROID) 112 MCG TABLET] Take 112 mcg by mouth Daily at 6:00 am.       lisinopriL (PRINIVIL,ZESTRIL) 20 MG tablet [LISINOPRIL (PRINIVIL,ZESTRIL) 20 MG TABLET] Take 20 mg by mouth every evening.       multivitamin with minerals (THERA-M) 9 mg iron-400 mcg Tab tablet [MULTIVITAMIN WITH MINERALS (THERA-M) 9 MG IRON-400 MCG TAB TABLET] Take 1 tablet by mouth daily.       omeprazole (PRILOSEC) 20 MG capsule [OMEPRAZOLE (PRILOSEC) 20 MG CAPSULE] Take 1 capsule (20 mg total) by mouth 2 (two) times a day before meals.  Qty: 60 capsule, Refills: 0    Associated Diagnoses: Gastrointestinal hemorrhage with melena      senna-docusate (SENNOSIDES-DOCUSATE SODIUM) 8.6-50 mg tablet [SENNA-DOCUSATE (SENNOSIDES-DOCUSATE SODIUM) 8.6-50 MG TABLET] Take 2 tablets by mouth daily. Hold for 2 or more loose stools per 24hrs  Refills: 0    Associated Diagnoses: Slow transit constipation           Allergies   Allergies   Allergen Reactions    Hydrocodone-Acetaminophen Nausea and Vomiting    Amoxicillin Other (See Comments)     Sores in mouth, tongue slightly swollen    Hydrochlorothiazide Unknown    Levofloxacin Other (See Comments)     \"mouth gets raw\"    Morphine Nausea and Vomiting     "

## 2024-02-23 NOTE — PROGRESS NOTES
Care Management Discharge Note    Discharge Date: 02/23/2024       Discharge Disposition:  home with home care  Discharge Services:  RN PT OT [facility]  Discharge DME:  n/a    Discharge Transportation: family or friend will provide    Private pay costs discussed: Not applicable    Education Provided on the Discharge Plan:  yes  Persons Notified of Discharge Plans: yes  Patient/Family in Agreement with the Plan: yes      Handoff Referral Completed: Yes    Additional Information:  SW met with pt to discuss, and confirm discharge plan to home with home care services; RN PT OT [facility]HA. All parties aware, and agreeable to discharge plan. [facility] intake notified of discharge. No other needs from  at this time. Family to transport.  11:28 AM    Brenna Kjellberg, BSW  2/23/2024

## 2024-02-23 NOTE — PROGRESS NOTES
"PRIMARY DIAGNOSIS: \"GENERIC\" NURSING  OUTPATIENT/OBSERVATION GOALS TO BE MET BEFORE DISCHARGE:  ADLs back to baseline: No    Activity and level of assistance: Assist x 1    Pain status: Improved-controlled with oral pain medications.    Return to near baseline physical activity: Yes     Discharge Planner Nurse   Safe discharge environment identified: Yes  Barriers to discharge: Yes         "

## 2024-03-13 ENCOUNTER — LAB REQUISITION (OUTPATIENT)
Dept: LAB | Facility: HOSPITAL | Age: 89
End: 2024-03-13
Payer: MEDICARE

## 2024-03-13 DIAGNOSIS — N18.30 CHRONIC KIDNEY DISEASE, STAGE 3 UNSPECIFIED (H): ICD-10-CM

## 2024-03-13 LAB
ANION GAP SERPL CALCULATED.3IONS-SCNC: 8 MMOL/L (ref 7–15)
BASOPHILS # BLD AUTO: 0 10E3/UL (ref 0–0.2)
BASOPHILS NFR BLD AUTO: 1 %
BUN SERPL-MCNC: 37.5 MG/DL (ref 8–23)
CALCIUM SERPL-MCNC: 9.2 MG/DL (ref 8.8–10.2)
CHLORIDE SERPL-SCNC: 106 MMOL/L (ref 98–107)
CREAT SERPL-MCNC: 1.08 MG/DL (ref 0.51–0.95)
DEPRECATED HCO3 PLAS-SCNC: 24 MMOL/L (ref 22–29)
EGFRCR SERPLBLD CKD-EPI 2021: 50 ML/MIN/1.73M2
EOSINOPHIL # BLD AUTO: 0.2 10E3/UL (ref 0–0.7)
EOSINOPHIL NFR BLD AUTO: 4 %
ERYTHROCYTE [DISTWIDTH] IN BLOOD BY AUTOMATED COUNT: 17.2 % (ref 10–15)
GLUCOSE SERPL-MCNC: 100 MG/DL (ref 70–99)
HCT VFR BLD AUTO: 28 % (ref 35–47)
HGB BLD-MCNC: 8.8 G/DL (ref 11.7–15.7)
IMM GRANULOCYTES # BLD: 0 10E3/UL
IMM GRANULOCYTES NFR BLD: 0 %
LYMPHOCYTES # BLD AUTO: 1 10E3/UL (ref 0.8–5.3)
LYMPHOCYTES NFR BLD AUTO: 25 %
MCH RBC QN AUTO: 34.2 PG (ref 26.5–33)
MCHC RBC AUTO-ENTMCNC: 31.4 G/DL (ref 31.5–36.5)
MCV RBC AUTO: 109 FL (ref 78–100)
MONOCYTES # BLD AUTO: 0.3 10E3/UL (ref 0–1.3)
MONOCYTES NFR BLD AUTO: 9 %
NEUTROPHILS # BLD AUTO: 2.5 10E3/UL (ref 1.6–8.3)
NEUTROPHILS NFR BLD AUTO: 61 %
NRBC # BLD AUTO: 0 10E3/UL
NRBC BLD AUTO-RTO: 0 /100
PLATELET # BLD AUTO: 178 10E3/UL (ref 150–450)
POTASSIUM SERPL-SCNC: 6 MMOL/L (ref 3.4–5.3)
RBC # BLD AUTO: 2.57 10E6/UL (ref 3.8–5.2)
SODIUM SERPL-SCNC: 138 MMOL/L (ref 135–145)
WBC # BLD AUTO: 4 10E3/UL (ref 4–11)

## 2024-03-13 PROCEDURE — 80048 BASIC METABOLIC PNL TOTAL CA: CPT | Mod: ORL

## 2024-03-13 PROCEDURE — 85025 COMPLETE CBC W/AUTO DIFF WBC: CPT | Mod: ORL

## 2024-08-08 ENCOUNTER — APPOINTMENT (OUTPATIENT)
Dept: CT IMAGING | Facility: HOSPITAL | Age: 89
DRG: 552 | End: 2024-08-08
Attending: EMERGENCY MEDICINE
Payer: MEDICARE

## 2024-08-08 ENCOUNTER — HOSPITAL ENCOUNTER (INPATIENT)
Facility: HOSPITAL | Age: 89
LOS: 8 days | Discharge: NURSING, REHAB OR LONG-TERM CARE | DRG: 552 | End: 2024-08-16
Attending: EMERGENCY MEDICINE | Admitting: INTERNAL MEDICINE
Payer: MEDICARE

## 2024-08-08 ENCOUNTER — APPOINTMENT (OUTPATIENT)
Dept: MRI IMAGING | Facility: HOSPITAL | Age: 89
DRG: 552 | End: 2024-08-08
Attending: EMERGENCY MEDICINE
Payer: MEDICARE

## 2024-08-08 DIAGNOSIS — K59.01 SLOW TRANSIT CONSTIPATION: ICD-10-CM

## 2024-08-08 DIAGNOSIS — S22.069A CLOSED FRACTURE OF EIGHTH THORACIC VERTEBRA, UNSPECIFIED FRACTURE MORPHOLOGY, INITIAL ENCOUNTER (H): ICD-10-CM

## 2024-08-08 DIAGNOSIS — M48.10 DISH (DIFFUSE IDIOPATHIC SKELETAL HYPEROSTOSIS): Primary | ICD-10-CM

## 2024-08-08 DIAGNOSIS — D53.9 MACROCYTIC ANEMIA: ICD-10-CM

## 2024-08-08 DIAGNOSIS — S42.034D CLOSED NONDISPLACED FRACTURE OF ACROMIAL END OF RIGHT CLAVICLE WITH ROUTINE HEALING: ICD-10-CM
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
## 2024-08-08 PROBLEM — I65.23 BILATERAL CAROTID ARTERY STENOSIS: Status: ACTIVE | Noted: 2023-01-12

## 2024-08-08 PROBLEM — J44.89 COPD WITH CHRONIC BRONCHITIS (H): Status: ACTIVE | Noted: 2022-04-21

## 2024-08-08 LAB
ANION GAP SERPL CALCULATED.3IONS-SCNC: 12 MMOL/L (ref 7–15)
BUN SERPL-MCNC: 27.1 MG/DL (ref 8–23)
CALCIUM SERPL-MCNC: 8.9 MG/DL (ref 8.8–10.4)
CHLORIDE SERPL-SCNC: 102 MMOL/L (ref 98–107)
CREAT SERPL-MCNC: 1.48 MG/DL (ref 0.51–0.95)
EGFRCR SERPLBLD CKD-EPI 2021: 34 ML/MIN/1.73M2
ERYTHROCYTE [DISTWIDTH] IN BLOOD BY AUTOMATED COUNT: 15 % (ref 10–15)
GLUCOSE SERPL-MCNC: 119 MG/DL (ref 70–99)
HCO3 SERPL-SCNC: 26 MMOL/L (ref 22–29)
HCT VFR BLD AUTO: 29.4 % (ref 35–47)
HGB BLD-MCNC: 9.7 G/DL (ref 11.7–15.7)
MCH RBC QN AUTO: 36.6 PG (ref 26.5–33)
MCHC RBC AUTO-ENTMCNC: 33 G/DL (ref 31.5–36.5)
MCV RBC AUTO: 111 FL (ref 78–100)
PLATELET # BLD AUTO: 127 10E3/UL (ref 150–450)
POTASSIUM SERPL-SCNC: 4.6 MMOL/L (ref 3.4–5.3)
RBC # BLD AUTO: 2.65 10E6/UL (ref 3.8–5.2)
SODIUM SERPL-SCNC: 140 MMOL/L (ref 135–145)
WBC # BLD AUTO: 9.1 10E3/UL (ref 4–11)

## 2024-08-08 PROCEDURE — 250N000013 HC RX MED GY IP 250 OP 250 PS 637: Performed by: EMERGENCY MEDICINE

## 2024-08-08 PROCEDURE — 72125 CT NECK SPINE W/O DYE: CPT | Mod: MA

## 2024-08-08 PROCEDURE — 12002 RPR S/N/AX/GEN/TRNK2.6-7.5CM: CPT

## 2024-08-08 PROCEDURE — 80048 BASIC METABOLIC PNL TOTAL CA: CPT

## 2024-08-08 PROCEDURE — 12014 RPR F/E/E/N/L/M 5.1-7.5 CM: CPT

## 2024-08-08 PROCEDURE — 72128 CT CHEST SPINE W/O DYE: CPT | Mod: MA

## 2024-08-08 PROCEDURE — 99223 1ST HOSP IP/OBS HIGH 75: CPT | Mod: AI | Performed by: INTERNAL MEDICINE

## 2024-08-08 PROCEDURE — 0HQ0XZZ REPAIR SCALP SKIN, EXTERNAL APPROACH: ICD-10-PCS | Performed by: EMERGENCY MEDICINE

## 2024-08-08 PROCEDURE — 72131 CT LUMBAR SPINE W/O DYE: CPT | Mod: MA

## 2024-08-08 PROCEDURE — 120N000001 HC R&B MED SURG/OB

## 2024-08-08 PROCEDURE — 250N000011 HC RX IP 250 OP 636: Performed by: EMERGENCY MEDICINE

## 2024-08-08 PROCEDURE — 70450 CT HEAD/BRAIN W/O DYE: CPT | Mod: MA

## 2024-08-08 PROCEDURE — 36415 COLL VENOUS BLD VENIPUNCTURE: CPT

## 2024-08-08 PROCEDURE — 72146 MRI CHEST SPINE W/O DYE: CPT | Mod: MA

## 2024-08-08 PROCEDURE — 99207 PR APP CREDIT; MD BILLING SHARED VISIT: CPT | Mod: AI

## 2024-08-08 PROCEDURE — 250N000009 HC RX 250

## 2024-08-08 PROCEDURE — 99221 1ST HOSP IP/OBS SF/LOW 40: CPT | Performed by: PHYSICIAN ASSISTANT

## 2024-08-08 PROCEDURE — 85027 COMPLETE CBC AUTOMATED: CPT

## 2024-08-08 PROCEDURE — 250N000013 HC RX MED GY IP 250 OP 250 PS 637: Performed by: INTERNAL MEDICINE

## 2024-08-08 PROCEDURE — 250N000013 HC RX MED GY IP 250 OP 250 PS 637

## 2024-08-08 PROCEDURE — 99285 EMERGENCY DEPT VISIT HI MDM: CPT | Mod: 25

## 2024-08-08 PROCEDURE — 94640 AIRWAY INHALATION TREATMENT: CPT

## 2024-08-08 RX ORDER — NALOXONE HYDROCHLORIDE 0.4 MG/ML
0.4 INJECTION, SOLUTION INTRAMUSCULAR; INTRAVENOUS; SUBCUTANEOUS
Status: DISCONTINUED | OUTPATIENT
Start: 2024-08-08 | End: 2024-08-16 | Stop reason: HOSPADM

## 2024-08-08 RX ORDER — ATORVASTATIN CALCIUM 10 MG/1
20 TABLET, FILM COATED ORAL DAILY
Status: DISCONTINUED | OUTPATIENT
Start: 2024-08-08 | End: 2024-08-16 | Stop reason: HOSPADM

## 2024-08-08 RX ORDER — AMLODIPINE BESYLATE 5 MG/1
1 TABLET ORAL DAILY
COMMUNITY
Start: 2024-06-11

## 2024-08-08 RX ORDER — IPRATROPIUM BROMIDE AND ALBUTEROL SULFATE 2.5; .5 MG/3ML; MG/3ML
3 SOLUTION RESPIRATORY (INHALATION)
Status: DISCONTINUED | OUTPATIENT
Start: 2024-08-08 | End: 2024-08-16 | Stop reason: HOSPADM

## 2024-08-08 RX ORDER — LEVOTHYROXINE SODIUM 112 UG/1
112 TABLET ORAL DAILY
Status: DISCONTINUED | OUTPATIENT
Start: 2024-08-09 | End: 2024-08-16 | Stop reason: HOSPADM

## 2024-08-08 RX ORDER — FERROUS SULFATE 325(65) MG
1 TABLET ORAL
Status: ON HOLD | COMMUNITY
Start: 2024-02-28 | End: 2024-08-16

## 2024-08-08 RX ORDER — HYDRALAZINE HYDROCHLORIDE 20 MG/ML
10 INJECTION INTRAMUSCULAR; INTRAVENOUS EVERY 4 HOURS PRN
Status: DISCONTINUED | OUTPATIENT
Start: 2024-08-08 | End: 2024-08-16 | Stop reason: HOSPADM

## 2024-08-08 RX ORDER — AMOXICILLIN 250 MG
1 CAPSULE ORAL 2 TIMES DAILY PRN
Status: DISCONTINUED | OUTPATIENT
Start: 2024-08-08 | End: 2024-08-11

## 2024-08-08 RX ORDER — NALOXONE HYDROCHLORIDE 0.4 MG/ML
0.2 INJECTION, SOLUTION INTRAMUSCULAR; INTRAVENOUS; SUBCUTANEOUS
Status: DISCONTINUED | OUTPATIENT
Start: 2024-08-08 | End: 2024-08-16 | Stop reason: HOSPADM

## 2024-08-08 RX ORDER — LIDOCAINE 40 MG/G
CREAM TOPICAL
Status: DISCONTINUED | OUTPATIENT
Start: 2024-08-08 | End: 2024-08-16 | Stop reason: HOSPADM

## 2024-08-08 RX ORDER — ACETAMINOPHEN 650 MG/1
650 SUPPOSITORY RECTAL EVERY 4 HOURS PRN
Status: DISCONTINUED | OUTPATIENT
Start: 2024-08-08 | End: 2024-08-16 | Stop reason: HOSPADM

## 2024-08-08 RX ORDER — PANTOPRAZOLE SODIUM 40 MG/1
40 TABLET, DELAYED RELEASE ORAL
Status: DISCONTINUED | OUTPATIENT
Start: 2024-08-08 | End: 2024-08-16 | Stop reason: HOSPADM

## 2024-08-08 RX ORDER — HYDROMORPHONE HCL IN WATER/PF 6 MG/30 ML
0.4 PATIENT CONTROLLED ANALGESIA SYRINGE INTRAVENOUS
Status: DISCONTINUED | OUTPATIENT
Start: 2024-08-08 | End: 2024-08-16 | Stop reason: HOSPADM

## 2024-08-08 RX ORDER — FERROUS SULFATE 325(65) MG
325 TABLET ORAL
Status: DISCONTINUED | OUTPATIENT
Start: 2024-08-09 | End: 2024-08-16 | Stop reason: HOSPADM

## 2024-08-08 RX ORDER — AMLODIPINE BESYLATE 5 MG/1
5 TABLET ORAL DAILY
Status: DISCONTINUED | OUTPATIENT
Start: 2024-08-08 | End: 2024-08-16 | Stop reason: HOSPADM

## 2024-08-08 RX ORDER — CALCIUM CARBONATE 500 MG/1
1000 TABLET, CHEWABLE ORAL 4 TIMES DAILY PRN
Status: DISCONTINUED | OUTPATIENT
Start: 2024-08-08 | End: 2024-08-16 | Stop reason: HOSPADM

## 2024-08-08 RX ORDER — HYDRALAZINE HYDROCHLORIDE 10 MG/1
10 TABLET, FILM COATED ORAL EVERY 4 HOURS PRN
Status: DISCONTINUED | OUTPATIENT
Start: 2024-08-08 | End: 2024-08-16 | Stop reason: HOSPADM

## 2024-08-08 RX ORDER — LORAZEPAM 0.5 MG/1
1 TABLET ORAL ONCE
Status: COMPLETED | OUTPATIENT
Start: 2024-08-08 | End: 2024-08-08

## 2024-08-08 RX ORDER — ALLOPURINOL 100 MG/1
100 TABLET ORAL 2 TIMES DAILY
Status: DISCONTINUED | OUTPATIENT
Start: 2024-08-09 | End: 2024-08-08

## 2024-08-08 RX ORDER — FUROSEMIDE 20 MG
20 TABLET ORAL DAILY
Status: DISCONTINUED | OUTPATIENT
Start: 2024-08-09 | End: 2024-08-16 | Stop reason: HOSPADM

## 2024-08-08 RX ORDER — ACETAMINOPHEN 325 MG/1
650 TABLET ORAL EVERY 4 HOURS PRN
Status: DISCONTINUED | OUTPATIENT
Start: 2024-08-08 | End: 2024-08-16 | Stop reason: HOSPADM

## 2024-08-08 RX ORDER — ASPIRIN 81 MG/1
81 TABLET, CHEWABLE ORAL DAILY
Status: DISCONTINUED | OUTPATIENT
Start: 2024-08-09 | End: 2024-08-16 | Stop reason: HOSPADM

## 2024-08-08 RX ORDER — OXYCODONE HYDROCHLORIDE 5 MG/1
5 TABLET ORAL EVERY 4 HOURS PRN
Status: DISCONTINUED | OUTPATIENT
Start: 2024-08-08 | End: 2024-08-16 | Stop reason: HOSPADM

## 2024-08-08 RX ORDER — MORPHINE SULFATE 4 MG/ML
4 INJECTION, SOLUTION INTRAMUSCULAR; INTRAVENOUS ONCE
Status: COMPLETED | OUTPATIENT
Start: 2024-08-08 | End: 2024-08-08

## 2024-08-08 RX ORDER — ALLOPURINOL 100 MG/1
100 TABLET ORAL 2 TIMES DAILY
Status: DISCONTINUED | OUTPATIENT
Start: 2024-08-08 | End: 2024-08-16 | Stop reason: HOSPADM

## 2024-08-08 RX ORDER — ONDANSETRON 2 MG/ML
4 INJECTION INTRAMUSCULAR; INTRAVENOUS ONCE
Status: COMPLETED | OUTPATIENT
Start: 2024-08-08 | End: 2024-08-08

## 2024-08-08 RX ORDER — FUROSEMIDE 20 MG
20 TABLET ORAL DAILY
COMMUNITY
Start: 2024-07-16

## 2024-08-08 RX ORDER — AMOXICILLIN 250 MG
2 CAPSULE ORAL 2 TIMES DAILY PRN
Status: DISCONTINUED | OUTPATIENT
Start: 2024-08-08 | End: 2024-08-11

## 2024-08-08 RX ORDER — HYDROMORPHONE HCL IN WATER/PF 6 MG/30 ML
0.2 PATIENT CONTROLLED ANALGESIA SYRINGE INTRAVENOUS
Status: DISCONTINUED | OUTPATIENT
Start: 2024-08-08 | End: 2024-08-16 | Stop reason: HOSPADM

## 2024-08-08 RX ORDER — LORAZEPAM 2 MG/ML
1 INJECTION INTRAMUSCULAR
Status: COMPLETED | OUTPATIENT
Start: 2024-08-08 | End: 2024-08-08

## 2024-08-08 RX ADMIN — LORAZEPAM 1 MG: 0.5 TABLET ORAL at 14:25

## 2024-08-08 RX ADMIN — IPRATROPIUM BROMIDE AND ALBUTEROL SULFATE 3 ML: .5; 3 SOLUTION RESPIRATORY (INHALATION) at 20:50

## 2024-08-08 RX ADMIN — MORPHINE SULFATE 4 MG: 4 INJECTION, SOLUTION INTRAMUSCULAR; INTRAVENOUS at 17:43

## 2024-08-08 RX ADMIN — PANTOPRAZOLE SODIUM 40 MG: 40 TABLET, DELAYED RELEASE ORAL at 21:34

## 2024-08-08 RX ADMIN — ATORVASTATIN CALCIUM 20 MG: 10 TABLET, FILM COATED ORAL at 21:34

## 2024-08-08 RX ADMIN — ALLOPURINOL 100 MG: 100 TABLET ORAL at 21:34

## 2024-08-08 RX ADMIN — LORAZEPAM 1 MG: 2 INJECTION INTRAMUSCULAR; INTRAVENOUS at 17:59

## 2024-08-08 RX ADMIN — ONDANSETRON 4 MG: 2 INJECTION INTRAMUSCULAR; INTRAVENOUS at 17:53

## 2024-08-08 ASSESSMENT — ACTIVITIES OF DAILY LIVING (ADL)
ADLS_ACUITY_SCORE: 38
ADLS_ACUITY_SCORE: 38
ADLS_ACUITY_SCORE: 31
ADLS_ACUITY_SCORE: 31
ADLS_ACUITY_SCORE: 38
ADLS_ACUITY_SCORE: 31
ADLS_ACUITY_SCORE: 38
ADLS_ACUITY_SCORE: 27
ADLS_ACUITY_SCORE: 38
ADLS_ACUITY_SCORE: 38

## 2024-08-08 ASSESSMENT — COLUMBIA-SUICIDE SEVERITY RATING SCALE - C-SSRS
6. HAVE YOU EVER DONE ANYTHING, STARTED TO DO ANYTHING, OR PREPARED TO DO ANYTHING TO END YOUR LIFE?: NO
1. IN THE PAST MONTH, HAVE YOU WISHED YOU WERE DEAD OR WISHED YOU COULD GO TO SLEEP AND NOT WAKE UP?: NO
2. HAVE YOU ACTUALLY HAD ANY THOUGHTS OF KILLING YOURSELF IN THE PAST MONTH?: NO

## 2024-08-08 NOTE — ED TRIAGE NOTES
Patient tripped and fell from standing striking the back of her head. No LOC, no N/V, no blood thinners. Patient refused placing c-collar for EMS. Patient denies neck/back pain. Patient has laceration to the back of head that was wrapped by EMS.      Triage Assessment (Adult)       Row Name 08/08/24 135          Triage Assessment    Airway WDL WDL        Respiratory WDL    Respiratory WDL WDL        Peripheral/Neurovascular WDL    Peripheral Neurovascular WDL WDL        Cognitive/Neuro/Behavioral WDL    Cognitive/Neuro/Behavioral WDL WDL

## 2024-08-08 NOTE — H&P
[facility]    History and Physical - Hospitalist Service       Date of Admission:  8/8/2024    Assessment & Plan        History of Present Illness   Sussy Cole is a 89 year old female with a pertinent history of TIA, intracranial atherosclerosis, bilateral carotid artery stenosis, anemia, hypertension, chronic obstructive pulmonary disease, chronic kidney disease stage 3, and breast cancer s/p lumpectomy who presents evaluation after a fall. Patient was getting out of her car when her foot got caught in the gravel. She stumbled and tried to hold onto a nearby machine, but fell backwards. No loss of consciousness, no use of blood thinners. Patient has mild tenderness of her back.  Patient denies neck pain.  Patient has large hematoma back of head with sutured laceration from ED. patient denies dizziness, vision problems, or nausea vomiting.  CT of thoracic spine results T8 vertebral fracture.  Neuro surgery consulted and they ordered MRI and strict bedrest.  MRI results to subacute fracture T8 vertebral body without involvement of the posterior wall.  Patient states she is comfortable at rest with minimal pain.  Awaiting neurosurgery recommendations after MRI results.     # Fall  # Closed fracture of the thoracic vertebra   CT of thoracic spine results T8 vertebral fracture.    Neuro surgery consulted and they ordered MRI and strict bedrest.    MRI results to subacute fracture T8 vertebral body without involvement of the posterior wall.   Awaiting neurosurgery recommendations after MRI results.   Bedrest and head of bed less than 20 degrees  N.p.o. awaiting neurosurgery's recommendation of surgical intervention after MRI result  Patient has large hematoma back of head with sutured laceration from ED  CT of head negative for bleed, left parietal scalp contusion laceration  CT spine cervical results: No CT evidence for acute fracture or traumatic subluxation involving the cervical spine.   Multilevel cervical spondylosis, further detailed above.   CT spine thoracic: results Largely fused thoracic spine with acute appearing obliquely oriented fracture involving the T8 vertebral body with extension of fracture line towards a bridging ventral syndesmophyte at T8-T9. No extension of fracture planes into the posterior elements.  No high-grade spinal canal or neural foraminal stenosis in the thoracic spine.   Oxycodone as needed for pain  IV Dilaudid as needed for pain  Fall precautions    # COPD  DuoNebs per RT 4 times daily    # Chronic anemia  CBC results pending  Resume home ferrous sulfate    # CKD- 3  BMP results pending    # Hypertension  Monitor blood pressure  Stable at this time  Hydralazine as needed  Resume home amlodipine  Resume home Lasix    # Hypothyroid  Resume home levothyroxine    # Hyperlipidemia  Resume atorvastatin       Diet: NPO per Anesthesia Guidelines for Procedure/Surgery Except for: Meds    DVT Prophylaxis: Pneumatic Compression Devices  Jenkins Catheter: Not present  Lines: None     Cardiac Monitoring: None  Code Status: Full Code      Clinically Significant Risk Factors Present on Admission                # Drug Induced Platelet Defect: home medication list includes an antiplatelet medication   # Hypertension: Noted on problem list                 Disposition Plan     Medically Ready for Discharge: Anticipated in 2-4 Days    The patient's care was discussed with the Attending Physician, Dr. Tam .    Chapis Iraheta NP  Hospitalist Service  [facility]  Securely message with [employer] (more info)  Text page via [employer] Paging/Directory     ______________________________________________________________________    Chief Complaint   Fall   Back pan    History is obtained from the patient and chart    History of Present Illness   Sussy Cole is a 89 year old female with a pertinent history of TIA, intracranial atherosclerosis, bilateral carotid artery  stenosis, anemia, hypertension, chronic obstructive pulmonary disease, chronic kidney disease stage 3, and breast cancer s/p lumpectomy who presents evaluation after a fall. Patient was getting out of her car when her foot got caught in the gravel. She stumbled and tried to hold onto a nearby machine, but fell backwards. No loss of consciousness, no use of blood thinners.  Patient has mild tenderness of her back.  Patient denies neck pain.  Patient denies numbness or tingling of legs or arms.  Patient denies loss of bowel or bladder.  Denies urinary symptoms.patient denies chest pain shortness of breath or palpitations. Patient has large hematoma back of head with sutured laceration from ED. Patient denies dizziness, vision problems, or nausea vomiting.  States she has minimal pain at rest.       Past Medical History    Past Medical History:   Diagnosis Date    Breast cancer (H)     Closed compression fracture of L2 vertebra, initial encounter (H) 06/02/2021    COPD (chronic obstructive pulmonary disease) (H)     Duodenal ulcer 04/2020    Duodenal ulcer due to nonsteroidal anti-inflammatory drug (NSAID) 04/03/2020    Formatting of this note might be different from the original. 3-26-20  IR embolization on 3-29-20, multiple units of blood. Admit hgb  6.1. aleve stopped. Patient had been taking aleve with prednisone.     Gastrointestinal hemorrhage with melena 03/26/2020    Added automatically from request for surgery [id number] Formatting of this note might be different from the original. Added automatically from request for surgery [id number]     History of humerus fracture 04/29/2021    HTN (hypertension)     Osteoporosis     Status post shoulder surgery 03/24/2009 12/14/2007  Unremarkable.  Repeat in 10 years Formatting of this note might be different from the original. 12/14/2007  Unremarkable.  Repeat in 10 years     Thyroid disease        Past Surgical History   Past Surgical History:   Procedure Laterality Date     BACK SURGERY      BONE MARROW BIOPSY, BONE SPECIMEN, NEEDLE/TROCAR Right 4/14/2022    Procedure: UNILATERAL BONE MARROW BIOPSY, SIDE DETERMINDED DAY OF PROCEDURE, ILIAC CREST;  Surgeon: Enzo Mitchell MD;  Location: [facility] Main OR    IR MESENTERIC ANGIOGRAM  03/29/2020    IR VISCERAL ANGIOGRAM  3/29/2020    LUMPECTOMY BREAST      MIDLINE INSERTION - DOUBLE LUMEN  03/29/2020         WV ESOPHAGOGASTRODUODENOSCOPY TRANSORAL DIAGNOSTIC N/A 03/29/2020    Procedure: ESOPHAGOGASTRODUODENOSCOPY (EGD) with Epi injection and cautery;  Surgeon: Pacheco Echeverria DO;  Location: [facility];  Service: Gastroenterology       Prior to Admission Medications   Prior to Admission Medications   Prescriptions Last Dose Informant Patient Reported? Taking?   allopurinoL (ZYLOPRIM) 100 MG tablet 8/8/2024 at am Self Yes Yes   Sig: [ALLOPURINOL (ZYLOPRIM) 100 MG TABLET] Take 100 mg by mouth 2 (two) times a day.    amLODIPine (NORVASC) 5 MG tablet 8/8/2024 at am  Yes Yes   Sig: Take 1 tablet by mouth daily   aspirin (ASA) 81 MG chewable tablet 8/8/2024 at am Self No Yes   Sig: Take 1 tablet (81 mg) by mouth daily   atorvastatin (LIPITOR) 20 MG tablet 8/7/2024 at pm Self No Yes   Sig: TAKE 1 TABLET (20 MG) BY MOUTH DAILY   calcium-vitamin D3-vitamin K (VIACTIV) 650 mg-12.5 mcg-40 mcg Chew 8/8/2024 at am Self Yes Yes   Sig: [CALCIUM-VITAMIN D3-VITAMIN K (VIACTIV) 650 MG-12.5 MCG-40 MCG CHEW] Chew 1 tablet 2 (two) times a day.    clotrimazole-betamethasone (LOTRISONE) 1-0.05 % external cream Unknown at prn Self Yes Yes   Sig: Apply topically 2 times daily as needed   diclofenac sodium (VOLTAREN) 1 % Gel Unknown at prn Self No Yes   Sig: [DICLOFENAC SODIUM (VOLTAREN) 1 % GEL] Apply 2 g topically 4 (four) times a day as needed. 2g to LUE, 4g to LLE   ferrous sulfate (FEROSUL) 325 (65 Fe) MG tablet 8/8/2024 at am  Yes Yes   Sig: Take 1 tablet by mouth daily (with breakfast)   furosemide (LASIX) 20 MG tablet 8/8/2024 at am  Yes Yes  "  Sig: Take 20 mg by mouth daily   ipratropium - albuterol 0.5 mg/2.5 mg/3 mL (DUONEB) 0.5-2.5 (3) MG/3ML neb solution 8/8/2024 at am Self Yes Yes   Sig: Take 1 vial by nebulization 2 times daily   levothyroxine (SYNTHROID, LEVOTHROID) 112 MCG tablet 8/8/2024 at am Self Yes Yes   Sig: [LEVOTHYROXINE (SYNTHROID, LEVOTHROID) 112 MCG TABLET] Take 112 mcg by mouth Daily at 6:00 am.    multivitamin with minerals (THERA-M) 9 mg iron-400 mcg Tab tablet 8/8/2024 at am Self Yes Yes   Sig: [MULTIVITAMIN WITH MINERALS (THERA-M) 9 MG IRON-400 MCG TAB TABLET] Take 1 tablet by mouth daily.    omeprazole (PRILOSEC) 20 MG capsule 8/8/2024 at am Self No Yes   Sig: [OMEPRAZOLE (PRILOSEC) 20 MG CAPSULE] Take 1 capsule (20 mg total) by mouth 2 (two) times a day before meals.      Facility-Administered Medications: None        Review of Systems    The 10 point Review of Systems is negative other than noted in the HPI or here.     Social History   I have reviewed this patient's social history and updated it with pertinent information if needed.  Social History     Tobacco Use    Smoking status: Former    Smokeless tobacco: Never   Substance Use Topics    Alcohol use: No    Drug use: No         Family History   I have reviewed this patient's family history and updated it with pertinent information if needed.  Family History   Problem Relation Age of Onset    Glaucoma Brother          Allergies   Allergies   Allergen Reactions    Hydrocodone-Acetaminophen Nausea and Vomiting    Amoxicillin Other (See Comments)     Sores in mouth, tongue slightly swollen    Hydrochlorothiazide Unknown    Levofloxacin Other (See Comments)     \"mouth gets raw\"    Morphine Nausea and Vomiting        Physical Exam   Vital Signs: Temp: 98.3  F (36.8  C) Temp src: Oral BP: (!) 158/69 Pulse: 100   Resp: 24 SpO2: 90 %      Weight: 0 lbs 0 oz    Constitutional: awake, alert, cooperative, no apparent distress, and appears stated age  Hematologic / Lymphatic: no " cervical lymphadenopathy and no supraclavicular lymphadenopathy  Respiratory: No increased work of breathing, good air exchange, clear to auscultation bilaterally, no crackles or wheezing  Cardiovascular: Normal apical impulse, regular rate and rhythm, normal S1 and S2, no S3 or S4, and no murmur noted  GI: No scars, normal bowel sounds, soft, non-distended, non-tender, no masses palpated, no hepatosplenomegally  Skin: no bruising or bleeding, normal skin color, texture, turgor, no redness, warmth, or swelling, and no rashes.  Hematoma posterior occiput laceration 7 cm in length with sutures  Musculoskeletal: There is no redness, warmth, or swelling of the joints.  Full range of motion noted.  Mild thoracic tenderness.  Equal strength bilateral lower extremity positive CMS lower extremity.  Neurologic: Awake, alert, oriented to name, place and time.  Cranial nerves II-XII are grossly intact.  Motor is 5 out of 5 bilaterally.    Neuropsychiatric: General: normal, calm, and normal eye contact    Medical Decision Making       75   MINUTES SPENT BY ME on the date of service doing chart review, history, exam, documentation & further activities per the note.      Data         Imaging results reviewed over the past 24 hrs:   Recent Results (from the past 24 hour(s))   Head CT w/o contrast    Narrative    EXAM: CT HEAD W/O CONTRAST  LOCATION: [facility]  DATE: 8/8/2024    INDICATION: Fall, head injury  COMPARISON: Head CT: 2/20/2024.  TECHNIQUE: Routine CT Head without IV contrast. Multiplanar reformats. Dose reduction techniques were used.    FINDINGS:  INTRACRANIAL CONTENTS: No intracranial hemorrhage, extraaxial collection, or mass effect.  No CT evidence of acute infarct. Similar mild encephalomalacia suspected along the anterior aspect of the left temporal lobe. Mild presumed chronic small vessel   ischemic changes. Mild generalized volume loss. No hydrocephalus. Calcified intracranial  atherosclerosis.    VISUALIZED ORBITS/SINUSES/MASTOIDS: No intraorbital abnormality. Small air-fluid level in the right maxillary sinus. No middle ear or mastoid effusion.    BONES/SOFT TISSUES: Left parietal scalp contusion/laceration without subjacent calvarial fracture.      Impression    IMPRESSION:    1.  No evidence of acute traumatic intracranial abnormality.  2.  Left parietal scalp contusion/laceration. No subjacent calvarial fracture.  3.  Chronic changes as above.   CT Cervical Spine w/o Contrast    Narrative    EXAM: CT CERVICAL SPINE W/O CONTRAST, CT THORACIC SPINE W/O CONTRAST  LOCATION: [facility]  DATE: 8/8/2024    INDICATION: Fall, head injury  COMPARISON: Cervical spine CT: 2/20/2024. Thoracic spine CT: 6/2/2021.  TECHNIQUE:  1) Routine CT Cervical Spine without IV contrast. Multiplanar reformats. Dose reduction techniques were used.   2) Routine CT Thoracic Spine without IV contrast. Multiplanar reformats. Dose reduction techniques were used.     FINDINGS:    CERVICAL SPINE CT:  VERTEBRA: No acute fracture or traumatic subluxation involving the cervical spine. Trace anterolisthesis of C4 on C5, favored degenerative secondary to facet hypertrophy. Chronic fracture along the tip of the left C1 transverse process. Vertebral body   heights are maintained. Broad levocurvature at the cervicothoracic junction, not previously present and likely positional in etiology.    CANAL/FORAMINA: Multilevel degenerative disc and facet disease throughout the cervical spine. No high-grade spinal canal stenosis. Mild spinal canal stenosis at C6-C7, largely related to a prominent posterior disc osteophyte complex. Neural foraminal   stenosis is greatest and at least moderate bilaterally at C6-C7.    PARASPINAL: No prevertebral soft tissue edema. Calcified atherosclerosis of the cervical carotid arteries. Imaged lung apices are clear.    THORACIC SPINE CT:  VERTEBRA: The thoracic spine is  largely fused with bridging ventrally directed and laterally directed osteophytes/syndesmophytes. There is an acute appearing obliquely oriented fracture involving the T8 vertebral body with extension of fracture line   towards a bridging syndesmophyte at T8-T9 (series 7/image 39 and series 8/images 43-52). No extension of fracture planes through the posterior elements. No significant progressive height loss associated with chronic mild T1 superior endplate fracture.   Similar appearance of chronic severe L2 superior endplate fracture. No traumatic subluxation. Broad-based dextrocurvature of the thoracic spine with apex at T9-T10 and slightly exaggerated kyphosis of the midthoracic spine. No substantial listhesis.   Osteopenia.    CANAL/FORAMINA: No high-grade spinal canal or neural foraminal stenosis in the thoracic spine. Mild to moderate spinal canal stenosis at L1-L2 secondary to bony retropulsion from chronic L2 superior endplate fracture.    PARASPINAL: Calcified atherosclerosis of the thoracic and abdominal aorta as well as branch vessels without aneurysm as imaged. Slight cardiomegaly. Calcified atherosclerosis of the coronary arteries. Scattered coil embolization material suspected in the   mid abdomen.      Impression    IMPRESSION:  CERVICAL SPINE CT:  1.  No CT evidence for acute fracture or traumatic subluxation involving the cervical spine.  2.  Multilevel cervical spondylosis, further detailed above.    THORACIC SPINE CT:  1.  Largely fused thoracic spine with acute appearing obliquely oriented fracture involving the T8 vertebral body with extension of fracture line towards a bridging ventral syndesmophyte at T8-T9. No extension of fracture planes into the posterior   elements.  2.  No high-grade spinal canal or neural foraminal stenosis in the thoracic spine.    Findings were discussed via telephone with Dr. French by myself at 3:35 PM CDT on 82,024.   CT Thoracic Spine w/o Contrast    Narrative     EXAM: CT CERVICAL SPINE W/O CONTRAST, CT THORACIC SPINE W/O CONTRAST  LOCATION: [facility]  DATE: 8/8/2024    INDICATION: Fall, head injury  COMPARISON: Cervical spine CT: 2/20/2024. Thoracic spine CT: 6/2/2021.  TECHNIQUE:  1) Routine CT Cervical Spine without IV contrast. Multiplanar reformats. Dose reduction techniques were used.   2) Routine CT Thoracic Spine without IV contrast. Multiplanar reformats. Dose reduction techniques were used.     FINDINGS:    CERVICAL SPINE CT:  VERTEBRA: No acute fracture or traumatic subluxation involving the cervical spine. Trace anterolisthesis of C4 on C5, favored degenerative secondary to facet hypertrophy. Chronic fracture along the tip of the left C1 transverse process. Vertebral body   heights are maintained. Broad levocurvature at the cervicothoracic junction, not previously present and likely positional in etiology.    CANAL/FORAMINA: Multilevel degenerative disc and facet disease throughout the cervical spine. No high-grade spinal canal stenosis. Mild spinal canal stenosis at C6-C7, largely related to a prominent posterior disc osteophyte complex. Neural foraminal   stenosis is greatest and at least moderate bilaterally at C6-C7.    PARASPINAL: No prevertebral soft tissue edema. Calcified atherosclerosis of the cervical carotid arteries. Imaged lung apices are clear.    THORACIC SPINE CT:  VERTEBRA: The thoracic spine is largely fused with bridging ventrally directed and laterally directed osteophytes/syndesmophytes. There is an acute appearing obliquely oriented fracture involving the T8 vertebral body with extension of fracture line   towards a bridging syndesmophyte at T8-T9 (series 7/image 39 and series 8/images 43-52). No extension of fracture planes through the posterior elements. No significant progressive height loss associated with chronic mild T1 superior endplate fracture.   Similar appearance of chronic severe L2 superior endplate  fracture. No traumatic subluxation. Broad-based dextrocurvature of the thoracic spine with apex at T9-T10 and slightly exaggerated kyphosis of the midthoracic spine. No substantial listhesis.   Osteopenia.    CANAL/FORAMINA: No high-grade spinal canal or neural foraminal stenosis in the thoracic spine. Mild to moderate spinal canal stenosis at L1-L2 secondary to bony retropulsion from chronic L2 superior endplate fracture.    PARASPINAL: Calcified atherosclerosis of the thoracic and abdominal aorta as well as branch vessels without aneurysm as imaged. Slight cardiomegaly. Calcified atherosclerosis of the coronary arteries. Scattered coil embolization material suspected in the   mid abdomen.      Impression    IMPRESSION:  CERVICAL SPINE CT:  1.  No CT evidence for acute fracture or traumatic subluxation involving the cervical spine.  2.  Multilevel cervical spondylosis, further detailed above.    THORACIC SPINE CT:  1.  Largely fused thoracic spine with acute appearing obliquely oriented fracture involving the T8 vertebral body with extension of fracture line towards a bridging ventral syndesmophyte at T8-T9. No extension of fracture planes into the posterior   elements.  2.  No high-grade spinal canal or neural foraminal stenosis in the thoracic spine.    Findings were discussed via telephone with Dr. French by myself at 3:35 PM CDT on 82,024.   CT Lumbar Spine w/o Contrast    Narrative    EXAM: CT LUMBAR SPINE W/O CONTRAST  LOCATION: [facility]  DATE: 8/8/2024    INDICATION: Low back pain  COMPARISON: Lumbar spine CT June 6, 2021.  TECHNIQUE: Routine CT Lumbar Spine without IV contrast. Multiplanar reformats. Dose reduction techniques were used.     FINDINGS:  VERTEBRA: Minimal age indeterminate progression of a moderate L2 vertebral body compression fracture. Unchanged alignment with grade 1 anterolisthesis of L4 on L5. Abutment and sclerosis of the L3-L5 vertebral spinous processes just  above the gastric.    CANAL/FORAMINA: Diffuse disc bulge with severe facet arthrosis results in unchanged severe L4-5 canal stenosis. No additional significant canal stenosis. No high-grade neural foraminal stenosis.    PARASPINAL: Hydropic gallbladder. Nonobstructing 3 mm right renal calculus.      Impression    IMPRESSION:  1.  Minimal age indeterminate progression of a moderate L2 vertebral body compression fracture.   2.  Unchanged severe L4-5 canal stenosis.

## 2024-08-08 NOTE — ED NOTES
Bed: JNED-23  Expected date: 8/8/24  Expected time: 1:33 PM  Means of arrival:   Comments:  Fall/[facility]

## 2024-08-08 NOTE — CONSULTS
[facility]    Neurosurgery Consultation     Date of Admission:  8/8/2024  Date of Consult (When I saw the patient): 08/08/24    Assessment & Plan   Sussy Cole is a 89 year old female who was admitted on 8/8/2024. I was asked to see the patient for severe back pain after fall noted to have possible unstable T8 DISH fracture.    Active Problems:    Fall, initial encounter    Closed fracture of eighth thoracic vertebra, unspecified fracture morphology, initial encounter (H)      Plan:   Strict bedrest with HOB< 20 degrees  Urgent MRI thoracic and lumbar spine to be completed   Patient states needs to be under general anesthesia - hospitalist to coordinate MRI to be completed  Likely would recommend surgical intervention if fracture unstable vs custom TLSO brace- discussed surgery briefly of which patient states that with her COPD she would not be able to tolerate surgery. Discussed risks associated with fracture and possibility of fracture worsening.   Further recommendations once MRI completed and reviewed       I have discussed the following assessment and plan with Dr. Ingram who is in agreement with initial plan and will follow up with further consultation recommendations.    Carmen Gamboa PA-C  [facility] Neurosurgery  O: [phone]          Code Status    Prior    Reason for Consult   Reason for consult: I was asked by Dr. French to evaluate this patient for T8 fracture.    Primary Care Physician   Bobby Quiroga    Chief Complaint   Back pain    History is obtained from the patient    History of Present Illness   Sussy Cole is a 89 year old female who presents with history of TIA, intracranial atherosclerosis, bilateral carotid artery stenosis, anemia, hypertension, chronic obstructive pulmonary disease, chronic kidney disease stage 3, and breast cancer s/p lumpectomy who presents to this ED by ambulance for evaluation of a fall. She states that she was walking in a  parking lot when she tripped over a rock and fell backwards. She denies any LOC and did not attempt to get up after the fall as she had large amount of bleeding from her head. She is not on blood thinners. She notes after the fall severe back pain as well as right shoulder pain that radiates down her arm to her elbow and fingers. She denies any radicular pain into LUE or bilateral LE. She denies any numbness or tingling. She denies any changes in bowel or bladder habits. She has severe back pain and notes that she is unable to move her legs secondary to pain. Notes stiffness of her whole body but denies neck pain and no pain to palpation of cervical spine.     Past Medical History   I have reviewed this patient's medical history and updated it with pertinent information if needed.   Past Medical History:   Diagnosis Date    Breast cancer (H)     Closed compression fracture of L2 vertebra, initial encounter (H) 06/02/2021    COPD (chronic obstructive pulmonary disease) (H)     Duodenal ulcer 04/2020    Duodenal ulcer due to nonsteroidal anti-inflammatory drug (NSAID) 04/03/2020    Formatting of this note might be different from the original. 3-26-20  IR embolization on 3-29-20, multiple units of blood. Admit hgb  6.1. aleve stopped. Patient had been taking aleve with prednisone.     Gastrointestinal hemorrhage with melena 03/26/2020    Added automatically from request for surgery [id number] Formatting of this note might be different from the original. Added automatically from request for surgery [id number]     History of humerus fracture 04/29/2021    HTN (hypertension)     Osteoporosis     Status post shoulder surgery 03/24/2009 12/14/2007  Unremarkable.  Repeat in 10 years Formatting of this note might be different from the original. 12/14/2007  Unremarkable.  Repeat in 10 years     Thyroid disease        Past Surgical History   I have reviewed this patient's surgical history and updated it with pertinent information if  needed.  Past Surgical History:   Procedure Laterality Date    BACK SURGERY      BONE MARROW BIOPSY, BONE SPECIMEN, NEEDLE/TROCAR Right 4/14/2022    Procedure: UNILATERAL BONE MARROW BIOPSY, SIDE DETERMINDED DAY OF PROCEDURE, ILIAC CREST;  Surgeon: Enzo Mitchell MD;  Location: [facility] OR    IR MESENTERIC ANGIOGRAM  03/29/2020    IR VISCERAL ANGIOGRAM  3/29/2020    LUMPECTOMY BREAST      MIDLINE INSERTION - DOUBLE LUMEN  03/29/2020         MT ESOPHAGOGASTRODUODENOSCOPY TRANSORAL DIAGNOSTIC N/A 03/29/2020    Procedure: ESOPHAGOGASTRODUODENOSCOPY (EGD) with Epi injection and cautery;  Surgeon: Pacheco Echeverria DO;  Location: [facility];  Service: Gastroenterology       Prior to Admission Medications   Prior to Admission Medications   Prescriptions Last Dose Informant Patient Reported? Taking?   allopurinoL (ZYLOPRIM) 100 MG tablet 8/8/2024 at am Self Yes Yes   Sig: [ALLOPURINOL (ZYLOPRIM) 100 MG TABLET] Take 100 mg by mouth 2 (two) times a day.    aspirin (ASA) 81 MG chewable tablet 8/8/2024 at am Self No Yes   Sig: Take 1 tablet (81 mg) by mouth daily   atorvastatin (LIPITOR) 20 MG tablet 8/7/2024 at pm Self No Yes   Sig: TAKE 1 TABLET (20 MG) BY MOUTH DAILY   calcium-vitamin D3-vitamin K (VIACTIV) 650 mg-12.5 mcg-40 mcg Chew 8/8/2024 at am Self Yes Yes   Sig: [CALCIUM-VITAMIN D3-VITAMIN K (VIACTIV) 650 MG-12.5 MCG-40 MCG CHEW] Chew 1 tablet 2 (two) times a day.    clotrimazole-betamethasone (LOTRISONE) 1-0.05 % external cream Unknown at prn Self Yes Yes   Sig: Apply topically 2 times daily as needed   diclofenac sodium (VOLTAREN) 1 % Gel Unknown at prn Self No Yes   Sig: [DICLOFENAC SODIUM (VOLTAREN) 1 % GEL] Apply 2 g topically 4 (four) times a day as needed. 2g to LUE, 4g to LLE   ipratropium - albuterol 0.5 mg/2.5 mg/3 mL (DUONEB) 0.5-2.5 (3) MG/3ML neb solution 8/8/2024 at am Self Yes Yes   Sig: Take 1 vial by nebulization 2 times daily   levothyroxine (SYNTHROID, LEVOTHROID) 112 MCG tablet  " Self Yes No   Sig: [LEVOTHYROXINE (SYNTHROID, LEVOTHROID) 112 MCG TABLET] Take 112 mcg by mouth Daily at 6:00 am.    multivitamin with minerals (THERA-M) 9 mg iron-400 mcg Tab tablet  Self Yes No   Sig: [MULTIVITAMIN WITH MINERALS (THERA-M) 9 MG IRON-400 MCG TAB TABLET] Take 1 tablet by mouth daily.    omeprazole (PRILOSEC) 20 MG capsule  Self No No   Sig: [OMEPRAZOLE (PRILOSEC) 20 MG CAPSULE] Take 1 capsule (20 mg total) by mouth 2 (two) times a day before meals.   senna-docusate (SENNOSIDES-DOCUSATE SODIUM) 8.6-50 mg tablet  Self No No   Sig: [SENNA-DOCUSATE (SENNOSIDES-DOCUSATE SODIUM) 8.6-50 MG TABLET] Take 2 tablets by mouth daily. Hold for 2 or more loose stools per 24hrs      Facility-Administered Medications: None     Allergies   Allergies   Allergen Reactions    Hydrocodone-Acetaminophen Nausea and Vomiting    Amoxicillin Other (See Comments)     Sores in mouth, tongue slightly swollen    Hydrochlorothiazide Unknown    Levofloxacin Other (See Comments)     \"mouth gets raw\"    Morphine Nausea and Vomiting       Social History   I have reviewed this patient's social history and updated it with pertinent information if needed. Sussy Cole  reports that she has quit smoking. She has never used smokeless tobacco. She reports that she does not drink alcohol and does not use drugs.    Family History   I have reviewed this patient's family history and updated it with pertinent information if needed.   Family History   Problem Relation Age of Onset    Glaucoma Brother        Review of Systems   14 point review of systems negative with exception to HPI     Physical Exam   Temp: 98.3  F (36.8  C) Temp src: Oral BP: (!) 165/73 Pulse: 99   Resp: 24 SpO2: 92 %      Vital Signs with Ranges  Temp:  [98.3  F (36.8  C)] 98.3  F (36.8  C)  Pulse:  [] 99  Resp:  [24-31] 24  BP: (141-179)/(64-75) 165/73  SpO2:  [92 %-100 %] 92 %  0 lbs 0 oz     , Blood pressure (!) 165/73, pulse 99, temperature 98.3  F (36.8  C), " temperature source Oral, resp. rate 24, SpO2 92%.  0 lbs 0 oz  HEENT:  Normocephalic, atraumatic.  PERRLA.  EOM s intact.   Neck:  Supple, non-tender, without lymphadenopathy.  Heart:  No peripheral edema  Lungs:  No SOB  Abdomen:  Soft, non-tender, non-distended.  Normal bowel sounds.  Skin:  Warm and dry, good capillary refill.  Extremities:  Good radial and dorsalis pedis pulses bilaterally, no edema, cyanosis or clubbing.    NEUROLOGICAL EXAMINATION:   Mental status:  Alert and Oriented x 3, speech is fluent.  Cranial nerves:  II-XII intact.   Motor:  weakness of bilateral LE secondary to pain and right shoulder   Deltoids:  Right: 4/5   Left:  5/5  Biceps:  Right: 5/5   Left:  5/5  Triceps: Right: 5/5   Left:  5/5                       Wrist Extensors: Right: 5/5   Left:  5/5        Wrist Flexors:Right: 5/5   Left:  5/5             Hand : Right: 5/5   Left:  5/5         Hip Flexor: Right: 4-/5   Left:  4-/5                 Knee extension :Right: 4/5   Left:  4/5               Knee flexion: Right: 4/5   Left:  4/5              Plantar flexion:Right: 5/5   Left:  5/5        dorsiflexion:Right: 5/5   Left:  5/5                        EHL:Right: 5/5   Left:  5/5                     Sensation:  intact to LT throughout   Reflexes:  Negative Babinski.  Negative Clonus.    Coordination:  Smooth finger to nose testing.   Negative pronator drift.   Gait:  not tested    Cervical examination reveals good range of motion.  No tenderness to palpation of the cervical spine or paraspinous muscles bilaterally.     Severe tenderness to thoracic spine palpation     Lumbar examination reveals severe tenderness of the spine or paraspinous muscles.  Hip height is symmetrical. Straight leg raise is negative bilaterally.       Images reviewed with noted possible unstable T8 DISH fracture   IMPRESSION:  HEAD CT  1.  No evidence of acute traumatic intracranial abnormality.  2.  Left parietal scalp contusion/laceration. No subjacent  calvarial fracture.  3.  Chronic changes as above.      CERVICAL SPINE CT:  1.  No CT evidence for acute fracture or traumatic subluxation involving the cervical spine.  2.  Multilevel cervical spondylosis, further detailed above.     THORACIC SPINE CT:  1.  Largely fused thoracic spine with acute appearing obliquely oriented fracture involving the T8 vertebral body with extension of fracture line towards a bridging ventral syndesmophyte at T8-T9. No extension of fracture planes into the posterior   elements.  2.  No high-grade spinal canal or neural foraminal stenosis in the thoracic spine.      Data     CBC RESULTS:   Recent Labs   Lab Test 03/13/24  1451   WBC 4.0   RBC 2.57*   HGB 8.8*   HCT 28.0*   *   MCH 34.2*   MCHC 31.4*   RDW 17.2*        Basic Metabolic Panel:  Lab Results   Component Value Date     03/13/2024      Lab Results   Component Value Date    POTASSIUM 6.0 03/13/2024    POTASSIUM 4.7 12/24/2021     Lab Results   Component Value Date    CHLORIDE 106 03/13/2024    CHLORIDE 108 12/24/2021     Lab Results   Component Value Date    CARMELA 9.2 03/13/2024     Lab Results   Component Value Date    CO2 24 03/13/2024    CO2 24 12/24/2021     Lab Results   Component Value Date    BUN 37.5 03/13/2024    BUN 41 12/24/2021     Lab Results   Component Value Date    CR 1.08 03/13/2024     Lab Results   Component Value Date     03/13/2024     02/23/2024     12/24/2021     INR:  Lab Results   Component Value Date    INR 0.95 12/24/2021    INR 1.11 03/29/2020    INR 1.05 03/26/2020

## 2024-08-08 NOTE — ED PROVIDER NOTES
EMERGENCY DEPARTMENT ENCOUNTER      NAME: Sussy Cole  AGE: 89 year old female  YOB: 1935  MRN: [id number]  EVALUATION DATE & TIME: 8/8/2024  1:46 PM    PCP: Bobby Quiroga    ED PROVIDER: Candi French DO      Chief Complaint   Patient presents with    Fall         FINAL IMPRESSION:  1. Closed fracture of eighth thoracic vertebra, unspecified fracture morphology, initial encounter (H)    2. Fall, initial encounter          ED COURSE & MEDICAL DECISION MAKING:    Pertinent Labs & Imaging studies reviewed. (See chart for details)  1:53 PM I met the patient and performed my initial interview and exam.  3:35 PM I spoke with radiology regarding patient's unstable T8 fracture seen on CT.  3:39 PM I rechecked and updated the patient and patient's daughter at bedside. Patient insisted on standing up and now endorses severe pain to her lower back.  4:16 PM I spoke with Carmen Gamboa, neurosurgery PA, who recommends MRI and strict bedrest until the MRI can be performed.  5:16 PM I discussed the case with hospitalist, Dr Tam, who accepts the patient for admission.     89 year old female presents to the Emergency Department for evaluation of a head injury after a fall.  Reports she was getting her car, tripped on the gravel and fell backward.  No loss consciousness.  She has been in her normal state of health.  She notes ongoing pain to the back of her left knee, she has an upcoming appointment.  I did review his chart.  Looks like she has a Baker's cyst on the left.  There is been no changes.  No signs of infection.  Repeat imaging was deferred.  She does have some midthoracic pain as well as a hematoma to her posterior aspect.  No neck pain but declines C-spine collar.  Will obtain CT imaging of the head, neck and thoracic spine.  Patient reports significant anxiety with any imaging, requesting Ativan which I think is reasonable.  Patient able to clearly tell me a mechanical reason for her fall,  not consistent with arrhythmia, electrolyte abnormality, anemia, infection therefore labs were deferred.  CT imaging of the head with posterior scalp hematoma no evidence of fracture.  This is boggy with several macerated lacerations, 11 staples were placed.  No cervical spine fracture.  There is an oblique oriented T8 fracture concerning for an unstable fracture per radiology.  Patient was tender in this spot.  She is neuro intact.  Now endorsing some low back pain.  CT of the lumbar spine without any acute fracture.  Discussed with neurosurgery.  Patient will need an MRI of her thoracic spine, bedrest until this is done.  Admitted to the hospitalist service.    At the conclusion of the encounter I discussed the results of all of the tests and the disposition. The questions were answered. The patient or family acknowledged understanding and was agreeable with the care plan.     Medical Decision Making  Obtained supplemental history:Supplemental history obtained?: Documented in chart  Reviewed external records: External records reviewed?: Documented in chart  Care impacted by chronic illness:Cerebrovascular Disease, Chronic Kidney Disease, Chronic Lung Disease, and Hypertension  Care significantly affected by social determinants of health:N/A  Did you consider but not order tests?: Work up considered but not performed and documented in chart, if applicable  Did you interpret images independently?: Independent interpretation of ECG and images noted in documentation, when applicable.  Consultation discussion with other provider:Did you involve another provider (consultant, MH, pharmacy, etc.)?: I discussed the care with another health care provider, see documentation for details.  Admit.      MEDICATIONS GIVEN IN THE EMERGENCY:  Medications   morphine (PF) injection 4 mg (has no administration in time range)   LORazepam (ATIVAN) tablet 1 mg (1 mg Oral $Given 8/8/24 1198)       NEW PRESCRIPTIONS STARTED AT TODAY'S ER  VISIT  New Prescriptions    No medications on file          =================================================================    HPI    Patient information was obtained from: patient, EMS    Use of : N/A         Sussy Cole is a 89 year old female with a pertinent history of TIA, intracranial atherosclerosis, bilateral carotid artery stenosis, anemia, hypertension, chronic obstructive pulmonary disease, chronic kidney disease stage 3, and breast cancer s/p lumpectomy who presents to this ED by ambulance for evaluation of a fall.    Patient was getting out of her car when her foot got caught in the gravel. She stumbled and tried to hold onto a nearby machine, but fell backwards. No loss of consciousness, no use of blood thinners. Patient called out for help and a bystander called EMS. Patient endorses pain to the back of her head. She refused a cervical collar placement, per EMS. Patient also endorses pain to the back of her left knee. A recent ultrasound showed a cyst in that area, and patient is seeing a provider on Monday to have it drained. Otherwise, she is at her baseline health.    Per chart review, patient had a venous ultrasound of her left lower extremity performed on 7/16/24 at the [facility] radiology clinic. Results showed a left popliteal fossa cyst measuring 5.4 X 1.9 X 2.3 cm, and no deep venous thrombosis in the left lower extremity.    Patient's last TDAP was 3/20/2017.      REVIEW OF SYSTEMS   Per HPI    PAST MEDICAL HISTORY:  Past Medical History:   Diagnosis Date    Breast cancer (H)     Closed compression fracture of L2 vertebra, initial encounter (H) 06/02/2021    COPD (chronic obstructive pulmonary disease) (H)     Duodenal ulcer 04/2020    Duodenal ulcer due to nonsteroidal anti-inflammatory drug (NSAID) 04/03/2020    Formatting of this note might be different from the original. 3-26-20  IR embolization on 3-29-20, multiple units of blood. Admit hgb  6.1. aleve  stopped. Patient had been taking aleve with prednisone.     Gastrointestinal hemorrhage with melena 03/26/2020    Added automatically from request for surgery [id number] Formatting of this note might be different from the original. Added automatically from request for surgery [id number]     History of humerus fracture 04/29/2021    HTN (hypertension)     Osteoporosis     Status post shoulder surgery 03/24/2009 12/14/2007  Unremarkable.  Repeat in 10 years Formatting of this note might be different from the original. 12/14/2007  Unremarkable.  Repeat in 10 years     Thyroid disease        PAST SURGICAL HISTORY:  Past Surgical History:   Procedure Laterality Date    BACK SURGERY      BONE MARROW BIOPSY, BONE SPECIMEN, NEEDLE/TROCAR Right 4/14/2022    Procedure: UNILATERAL BONE MARROW BIOPSY, SIDE DETERMINDED DAY OF PROCEDURE, ILIAC CREST;  Surgeon: Enzo Mitchell MD;  Location: [facility] Main OR    IR MESENTERIC ANGIOGRAM  03/29/2020    IR VISCERAL ANGIOGRAM  3/29/2020    LUMPECTOMY BREAST      MIDLINE INSERTION - DOUBLE LUMEN  03/29/2020         NH ESOPHAGOGASTRODUODENOSCOPY TRANSORAL DIAGNOSTIC N/A 03/29/2020    Procedure: ESOPHAGOGASTRODUODENOSCOPY (EGD) with Epi injection and cautery;  Surgeon: Pacheco Echeverria DO;  Location: [facility] [place];  Service: Gastroenterology           CURRENT MEDICATIONS:    allopurinoL (ZYLOPRIM) 100 MG tablet  amLODIPine (NORVASC) 5 MG tablet  aspirin (ASA) 81 MG chewable tablet  atorvastatin (LIPITOR) 20 MG tablet  calcium-vitamin D3-vitamin K (VIACTIV) 650 mg-12.5 mcg-40 mcg Chew  clotrimazole-betamethasone (LOTRISONE) 1-0.05 % external cream  diclofenac sodium (VOLTAREN) 1 % Gel  ferrous sulfate (FEROSUL) 325 (65 Fe) MG tablet  furosemide (LASIX) 20 MG tablet  ipratropium - albuterol 0.5 mg/2.5 mg/3 mL (DUONEB) 0.5-2.5 (3) MG/3ML neb solution  levothyroxine (SYNTHROID, LEVOTHROID) 112 MCG tablet  multivitamin with minerals (THERA-M) 9 mg iron-400 mcg Tab tablet  omeprazole  "(PRILOSEC) 20 MG capsule         ALLERGIES:  Allergies   Allergen Reactions    Hydrocodone-Acetaminophen Nausea and Vomiting    Amoxicillin Other (See Comments)     Sores in mouth, tongue slightly swollen    Hydrochlorothiazide Unknown    Levofloxacin Other (See Comments)     \"mouth gets raw\"    Morphine Nausea and Vomiting       FAMILY HISTORY:  Family History   Problem Relation Age of Onset    Glaucoma Brother        SOCIAL HISTORY:   Social History     Socioeconomic History    Marital status: Single   Tobacco Use    Smoking status: Former    Smokeless tobacco: Never   Substance and Sexual Activity    Alcohol use: No    Drug use: No     Social Determinants of Health     Financial Resource Strain: Low Risk  (2/28/2024)    Received from [employer]    Financial Resource Strain     Difficulty of Paying Living Expenses: 3   Food Insecurity: No Food Insecurity (2/28/2024)    Received from [employer]    Food Insecurity     Worried About Running Out of Food in the Last Year: 1   Transportation Needs: No Transportation Needs (2/28/2024)    Received from [employer]    Transportation Needs     Lack of Transportation (Medical): 1   Social Connections: Socially Integrated (2/28/2024)    Received from [employer]    Social Connections     Frequency of Communication with Friends and Family: 0   Housing Stability: Low Risk  (2/28/2024)    Received from [employer]    Housing Stability     Unable to Pay for Housing in the Last Year: 1       VITAL SIGNS: BP (!) 165/73   Pulse 99   Temp 98.3  F (36.8  C) (Oral)   Resp 24   SpO2 92%    Constitutional:  Well developed, Well nourished,  HENT:  Normocephalic, Large hematoma to posterior occiput, several macerated lacerations, 7 cm in length, Bilateral external ears normal, No Hemotympanum, Oropharynx moist, No oral " exudates, Nose normal. No facial trauma  Neck:  Normal range of motion, No c-spine tenderness, Supple, No stridor.   Eyes:  PERRL, EOMI, Conjunctiva normal, No discharge, Vision normal  Respiratory:  Equal breath sounds bilaterally, No respiratory distress, No wheezing, No chest tenderness or deformity.   Cardiovascular:  Normal heart rate, Normal rhythm, No murmurs, No rubs, No gallops.   GI:  Soft, No tenderness, No gaurding, No CVA tenderness, no echymosis.   Musculoskeletal:  Neurovascularly intact distally, No edema, No tenderness, No cyanosis, No clubbing. Good range of motion in all major joints. No tenderness to palpation or major deformities noted.   Back:  +mid thoracic spine tenderness.  No l-spine tenderness, no step offs.   Integument:  Warm, Dry, No erythema, No rash.   Neurologic:  Alert & oriented x 3, Normal motor function, Normal sensory function, No focal deficits noted.   Psychiatric:  Anxious    LAB:  All pertinent labs reviewed and interpreted.  Labs Ordered and Resulted from Time of ED Arrival to Time of ED Departure - No data to display    RADIOLOGY:  Reviewed all pertinent imaging. Please see official radiology report.  CT Lumbar Spine w/o Contrast   Final Result   IMPRESSION:   1.  Minimal age indeterminate progression of a moderate L2 vertebral body compression fracture.    2.  Unchanged severe L4-5 canal stenosis.      CT Thoracic Spine w/o Contrast   Final Result   IMPRESSION:   CERVICAL SPINE CT:   1.  No CT evidence for acute fracture or traumatic subluxation involving the cervical spine.   2.  Multilevel cervical spondylosis, further detailed above.      THORACIC SPINE CT:   1.  Largely fused thoracic spine with acute appearing obliquely oriented fracture involving the T8 vertebral body with extension of fracture line towards a bridging ventral syndesmophyte at T8-T9. No extension of fracture planes into the posterior    elements.   2.  No high-grade spinal canal or neural foraminal  stenosis in the thoracic spine.      Findings were discussed via telephone with Dr. French by myself at 3:35 PM CDT on 82,024.      CT Cervical Spine w/o Contrast   Final Result   IMPRESSION:   CERVICAL SPINE CT:   1.  No CT evidence for acute fracture or traumatic subluxation involving the cervical spine.   2.  Multilevel cervical spondylosis, further detailed above.      THORACIC SPINE CT:   1.  Largely fused thoracic spine with acute appearing obliquely oriented fracture involving the T8 vertebral body with extension of fracture line towards a bridging ventral syndesmophyte at T8-T9. No extension of fracture planes into the posterior    elements.   2.  No high-grade spinal canal or neural foraminal stenosis in the thoracic spine.      Findings were discussed via telephone with Dr. French by myself at 3:35 PM CDT on 82,024.      Head CT w/o contrast   Final Result   IMPRESSION:      1.  No evidence of acute traumatic intracranial abnormality.   2.  Left parietal scalp contusion/laceration. No subjacent calvarial fracture.   3.  Chronic changes as above.      Thoracic spine MRI w/o contrast    (Results Pending)       PROCEDURES:   PROCEDURE: Laceration Repair   INDICATIONS: Laceration   PROCEDURE PROVIDER: Dr Johanna French   SITE: Occiput   TYPE/SIZE: simple, clean, and no foreign body visualized  7 cm (total length)   FUNCTIONAL ASSESSMENT: Distal sensation, circulation, and motor intact   MEDICATION: 6 mLs of 1% Lidocaine with epinephrine   PREPARATION: irrigation with Normal saline   DEBRIDEMENT: wound explored, no foreign body found   CLOSURE:  Superficial layer closed with 11 staples    Total number of sutures/staples placed: 11         ITaryn, am serving as a scribe to document services personally performed by Dr. Candi French based on my observation and the provider's statements to me. Candi SHERIDAN, DO attest that Taryn Saleh is acting in a scribe capacity, has observed my performance of the  services and has documented them in accordance with my direction.    Candi French DO  Emergency Medicine  [facility] EMERGENCY DEPARTMENT  [facility] [facility] [address] [phone]  Dept: [phone]       Candi French DO  08/08/24 8567

## 2024-08-08 NOTE — MEDICATION SCRIBE - ADMISSION MEDICATION HISTORY
Medication Scribe Admission Medication History    Admission medication history is complete. The information provided in this note is only as accurate as the sources available at the time of the update.    Information Source(s): Patient, Clinic records, Hospital records, and CareEverywhere/SureScripts via in-person    Pertinent Information:     Changes made to PTA medication list:  Added: None  Deleted: None  Changed: None    Allergies reviewed with patient and updates made in EHR: yes    Medication History Completed By: MAYA SAHA 8/8/2024 5:22 PM    PTA Med List   Medication Sig Last Dose    allopurinoL (ZYLOPRIM) 100 MG tablet [ALLOPURINOL (ZYLOPRIM) 100 MG TABLET] Take 100 mg by mouth 2 (two) times a day.  8/8/2024 at am    amLODIPine (NORVASC) 5 MG tablet Take 1 tablet by mouth daily 8/8/2024 at am    aspirin (ASA) 81 MG chewable tablet Take 1 tablet (81 mg) by mouth daily 8/8/2024 at am    atorvastatin (LIPITOR) 20 MG tablet TAKE 1 TABLET (20 MG) BY MOUTH DAILY 8/7/2024 at pm    calcium-vitamin D3-vitamin K (VIACTIV) 650 mg-12.5 mcg-40 mcg Chew [CALCIUM-VITAMIN D3-VITAMIN K (VIACTIV) 650 MG-12.5 MCG-40 MCG CHEW] Chew 1 tablet 2 (two) times a day.  8/8/2024 at am    clotrimazole-betamethasone (LOTRISONE) 1-0.05 % external cream Apply topically 2 times daily as needed Unknown at prn    diclofenac sodium (VOLTAREN) 1 % Gel [DICLOFENAC SODIUM (VOLTAREN) 1 % GEL] Apply 2 g topically 4 (four) times a day as needed. 2g to LUE, 4g to LLE Unknown at prn    ferrous sulfate (FEROSUL) 325 (65 Fe) MG tablet Take 1 tablet by mouth daily (with breakfast) 8/8/2024 at am    furosemide (LASIX) 20 MG tablet Take 20 mg by mouth daily 8/8/2024 at am    ipratropium - albuterol 0.5 mg/2.5 mg/3 mL (DUONEB) 0.5-2.5 (3) MG/3ML neb solution Take 1 vial by nebulization 2 times daily 8/8/2024 at am    levothyroxine (SYNTHROID, LEVOTHROID) 112 MCG tablet [LEVOTHYROXINE (SYNTHROID, LEVOTHROID) 112 MCG TABLET] Take 112 mcg by mouth  Daily at 6:00 am.  8/8/2024 at am    multivitamin with minerals (THERA-M) 9 mg iron-400 mcg Tab tablet [MULTIVITAMIN WITH MINERALS (THERA-M) 9 MG IRON-400 MCG TAB TABLET] Take 1 tablet by mouth daily.  8/8/2024 at am    omeprazole (PRILOSEC) 20 MG capsule [OMEPRAZOLE (PRILOSEC) 20 MG CAPSULE] Take 1 capsule (20 mg total) by mouth 2 (two) times a day before meals. 8/8/2024 at am

## 2024-08-09 PROBLEM — M48.10 DISH (DIFFUSE IDIOPATHIC SKELETAL HYPEROSTOSIS): Status: ACTIVE | Noted: 2024-08-09

## 2024-08-09 LAB
ANION GAP SERPL CALCULATED.3IONS-SCNC: 9 MMOL/L (ref 7–15)
BASOPHILS # BLD AUTO: 0 10E3/UL (ref 0–0.2)
BASOPHILS NFR BLD AUTO: 0 %
BUN SERPL-MCNC: 23.2 MG/DL (ref 8–23)
CALCIUM SERPL-MCNC: 8.6 MG/DL (ref 8.8–10.4)
CHLORIDE SERPL-SCNC: 102 MMOL/L (ref 98–107)
CREAT SERPL-MCNC: 1.01 MG/DL (ref 0.51–0.95)
EGFRCR SERPLBLD CKD-EPI 2021: 54 ML/MIN/1.73M2
EOSINOPHIL # BLD AUTO: 0 10E3/UL (ref 0–0.7)
EOSINOPHIL NFR BLD AUTO: 0 %
ERYTHROCYTE [DISTWIDTH] IN BLOOD BY AUTOMATED COUNT: 15.1 % (ref 10–15)
GLUCOSE SERPL-MCNC: 123 MG/DL (ref 70–99)
HCO3 SERPL-SCNC: 28 MMOL/L (ref 22–29)
HCT VFR BLD AUTO: 30.6 % (ref 35–47)
HGB BLD-MCNC: 10.2 G/DL (ref 11.7–15.7)
IMM GRANULOCYTES # BLD: 0.1 10E3/UL
IMM GRANULOCYTES NFR BLD: 1 %
LYMPHOCYTES # BLD AUTO: 0.7 10E3/UL (ref 0.8–5.3)
LYMPHOCYTES NFR BLD AUTO: 10 %
MCH RBC QN AUTO: 37.2 PG (ref 26.5–33)
MCHC RBC AUTO-ENTMCNC: 33.3 G/DL (ref 31.5–36.5)
MCV RBC AUTO: 112 FL (ref 78–100)
MONOCYTES # BLD AUTO: 0.4 10E3/UL (ref 0–1.3)
MONOCYTES NFR BLD AUTO: 6 %
NEUTROPHILS # BLD AUTO: 5.6 10E3/UL (ref 1.6–8.3)
NEUTROPHILS NFR BLD AUTO: 83 %
NRBC # BLD AUTO: 0 10E3/UL
NRBC BLD AUTO-RTO: 0 /100
PLATELET # BLD AUTO: 126 10E3/UL (ref 150–450)
POTASSIUM SERPL-SCNC: 4.6 MMOL/L (ref 3.4–5.3)
RBC # BLD AUTO: 2.74 10E6/UL (ref 3.8–5.2)
SODIUM SERPL-SCNC: 139 MMOL/L (ref 135–145)
VIT B12 SERPL-MCNC: 671 PG/ML (ref 232–1245)
WBC # BLD AUTO: 6.8 10E3/UL (ref 4–11)

## 2024-08-09 PROCEDURE — 85025 COMPLETE CBC W/AUTO DIFF WBC: CPT | Performed by: INTERNAL MEDICINE

## 2024-08-09 PROCEDURE — 120N000001 HC R&B MED SURG/OB

## 2024-08-09 PROCEDURE — 999N000157 HC STATISTIC RCP TIME EA 10 MIN

## 2024-08-09 PROCEDURE — 82607 VITAMIN B-12: CPT | Performed by: STUDENT IN AN ORGANIZED HEALTH CARE EDUCATION/TRAINING PROGRAM

## 2024-08-09 PROCEDURE — 250N000013 HC RX MED GY IP 250 OP 250 PS 637: Performed by: INTERNAL MEDICINE

## 2024-08-09 PROCEDURE — 80048 BASIC METABOLIC PNL TOTAL CA: CPT | Performed by: INTERNAL MEDICINE

## 2024-08-09 PROCEDURE — 99232 SBSQ HOSP IP/OBS MODERATE 35: CPT | Performed by: STUDENT IN AN ORGANIZED HEALTH CARE EDUCATION/TRAINING PROGRAM

## 2024-08-09 PROCEDURE — 250N000013 HC RX MED GY IP 250 OP 250 PS 637: Performed by: STUDENT IN AN ORGANIZED HEALTH CARE EDUCATION/TRAINING PROGRAM

## 2024-08-09 PROCEDURE — 94640 AIRWAY INHALATION TREATMENT: CPT

## 2024-08-09 PROCEDURE — 94640 AIRWAY INHALATION TREATMENT: CPT | Mod: 76

## 2024-08-09 PROCEDURE — 250N000009 HC RX 250

## 2024-08-09 PROCEDURE — 999N000156 HC STATISTIC RCP CONSULT EA 30 MIN

## 2024-08-09 PROCEDURE — 250N000013 HC RX MED GY IP 250 OP 250 PS 637

## 2024-08-09 PROCEDURE — 36415 COLL VENOUS BLD VENIPUNCTURE: CPT | Performed by: INTERNAL MEDICINE

## 2024-08-09 RX ORDER — ACETAMINOPHEN 325 MG/1
975 TABLET ORAL 3 TIMES DAILY
Status: DISCONTINUED | OUTPATIENT
Start: 2024-08-09 | End: 2024-08-16 | Stop reason: HOSPADM

## 2024-08-09 RX ORDER — HYDROXYZINE HYDROCHLORIDE 50 MG/1
50 TABLET, FILM COATED ORAL EVERY 6 HOURS PRN
Status: DISCONTINUED | OUTPATIENT
Start: 2024-08-09 | End: 2024-08-16 | Stop reason: HOSPADM

## 2024-08-09 RX ORDER — HYDROXYZINE HYDROCHLORIDE 25 MG/1
25 TABLET, FILM COATED ORAL EVERY 6 HOURS PRN
Status: DISCONTINUED | OUTPATIENT
Start: 2024-08-09 | End: 2024-08-16 | Stop reason: HOSPADM

## 2024-08-09 RX ADMIN — ASPIRIN 81 MG CHEWABLE TABLET 81 MG: 81 TABLET CHEWABLE at 08:17

## 2024-08-09 RX ADMIN — LEVOTHYROXINE SODIUM 112 MCG: 0.11 TABLET ORAL at 04:23

## 2024-08-09 RX ADMIN — FUROSEMIDE 20 MG: 20 TABLET ORAL at 08:18

## 2024-08-09 RX ADMIN — ALLOPURINOL 100 MG: 100 TABLET ORAL at 08:17

## 2024-08-09 RX ADMIN — IPRATROPIUM BROMIDE AND ALBUTEROL SULFATE 3 ML: .5; 3 SOLUTION RESPIRATORY (INHALATION) at 11:49

## 2024-08-09 RX ADMIN — ALLOPURINOL 100 MG: 100 TABLET ORAL at 21:18

## 2024-08-09 RX ADMIN — IPRATROPIUM BROMIDE AND ALBUTEROL SULFATE 3 ML: .5; 3 SOLUTION RESPIRATORY (INHALATION) at 20:12

## 2024-08-09 RX ADMIN — ACETAMINOPHEN 975 MG: 325 TABLET ORAL at 15:37

## 2024-08-09 RX ADMIN — FERROUS SULFATE TAB 325 MG (65 MG ELEMENTAL FE) 325 MG: 325 (65 FE) TAB at 08:17

## 2024-08-09 RX ADMIN — OXYCODONE HYDROCHLORIDE 2.5 MG: 5 TABLET ORAL at 01:05

## 2024-08-09 RX ADMIN — PANTOPRAZOLE SODIUM 40 MG: 40 TABLET, DELAYED RELEASE ORAL at 16:28

## 2024-08-09 RX ADMIN — AMLODIPINE BESYLATE 5 MG: 5 TABLET ORAL at 21:18

## 2024-08-09 RX ADMIN — ACETAMINOPHEN 650 MG: 325 TABLET ORAL at 01:05

## 2024-08-09 RX ADMIN — OXYCODONE HYDROCHLORIDE 2.5 MG: 5 TABLET ORAL at 10:01

## 2024-08-09 RX ADMIN — IPRATROPIUM BROMIDE AND ALBUTEROL SULFATE 3 ML: .5; 3 SOLUTION RESPIRATORY (INHALATION) at 08:29

## 2024-08-09 RX ADMIN — IPRATROPIUM BROMIDE AND ALBUTEROL SULFATE 3 ML: .5; 3 SOLUTION RESPIRATORY (INHALATION) at 16:18

## 2024-08-09 RX ADMIN — PANTOPRAZOLE SODIUM 40 MG: 40 TABLET, DELAYED RELEASE ORAL at 08:17

## 2024-08-09 RX ADMIN — ATORVASTATIN CALCIUM 20 MG: 10 TABLET, FILM COATED ORAL at 21:19

## 2024-08-09 RX ADMIN — ACETAMINOPHEN 975 MG: 325 TABLET ORAL at 21:16

## 2024-08-09 ASSESSMENT — ACTIVITIES OF DAILY LIVING (ADL)
ADLS_ACUITY_SCORE: 34
ADLS_ACUITY_SCORE: 31
ADLS_ACUITY_SCORE: 42
ADLS_ACUITY_SCORE: 33
ADLS_ACUITY_SCORE: 42
ADLS_ACUITY_SCORE: 33
ADLS_ACUITY_SCORE: 34
ADLS_ACUITY_SCORE: 34
ADLS_ACUITY_SCORE: 42
ADLS_ACUITY_SCORE: 33
ADLS_ACUITY_SCORE: 42
ADLS_ACUITY_SCORE: 31
ADLS_ACUITY_SCORE: 42
ADLS_ACUITY_SCORE: 42
ADLS_ACUITY_SCORE: 34
DEPENDENT_IADLS:: CLEANING;COOKING;LAUNDRY;SHOPPING;MEAL PREPARATION;TRANSPORTATION
ADLS_ACUITY_SCORE: 34
ADLS_ACUITY_SCORE: 31
ADLS_ACUITY_SCORE: 33
ADLS_ACUITY_SCORE: 31
ADLS_ACUITY_SCORE: 34
ADLS_ACUITY_SCORE: 42
ADLS_ACUITY_SCORE: 33
ADLS_ACUITY_SCORE: 34

## 2024-08-09 NOTE — PROGRESS NOTES
Betty endorsing back pain with any movement.    Reviewed MRI with Dr Diaz  Recommend conservative management in TLSO brace    Subjective      Betty endorsing back pain with any movement. She is currently NPO and is thirsty MRI thoracic was obtained yesterday. Denies radiating leg symptoms.     Objective     VITAL SIGNS    Temp:  [96.4  F (35.8  C)-98.3  F (36.8  C)] 96.4  F (35.8  C)  Pulse:  [] 100  Resp:  [18-31] 19  BP: (128-179)/(61-75) 140/62  SpO2:  [90 %-100 %] 97 %     Intake/Output Summary (Last 24 hours) at 8/9/2024 1136  Last data filed at 8/8/2024 2211  Gross per 24 hour   Intake 3 ml   Output 725 ml   Net -722 ml     I/O last 3 completed shifts:  In: 3 [I.V.:3]  Out: 725 [Urine:725]    PHYSICAL EXAMINATION    In bed, alert and oriented, 5/5 bilateral lowers, pain limited. Symmetric sensation to light touch. Normoreflexic.      DIAGNOSTICS  MRI thoracic from 8/8/24 demonstrates one column T8 vertebral body fracture with no posterior element involvement. ALL disruption is not well assessed in this imaging. DISH present     IMPRESSION/REPORT/PLAN:  (S22.913M) Closed fracture of eighth thoracic vertebra, unspecified fracture morphology, initial encounter (H)  (W19.XXXA) Fall, initial encounter  DISH    In the setting of  DISH, this is a fracture that while not unstable will require strict bracing. Patient states she has a brace at home that she would like to have us assess to see if this will work for her. Spoke to son, he will deliver this later today. I will assess tomorrow morning. She should remain on bed rest until brace can be applied. We have no plans for surgical intervention at this point. If her brace from home is not appropriate, we will order a TLSO.  Please continue to monitor neuro exam     Please page neurosurgery with question/concerns

## 2024-08-09 NOTE — PLAN OF CARE
Problem: Adult Inpatient Plan of Care  Goal: Plan of Care Review  Outcome: Progressing   Goal Outcome Evaluation:  Patient alert and oriented. Continues to be bedrest until daughter arrives with the brace from home.  If the brace is not appropriate for the fracture will need to order new brace from [facility] and remain bedrest. Pain is managed with prn oxycodone 2.5mg. Sutures intact to back of her head. Refuses ice-pack. Was able to eat some finger food for lunch. Incontinent x1 today and pure-wick draining 150cc brenda colored urine.    Maria G Townsend RN

## 2024-08-09 NOTE — CONSULTS
Care Management Initial Consult    General Information  Assessment completed with: Children, Daughter, Rukhsana  Type of CM/SW Visit: Initial Assessment    Primary Care Provider verified and updated as needed:     Readmission within the last 30 days: no previous admission in last 30 days      Reason for Consult: discharge planning  Advance Care Planning: Advance Care Planning Reviewed: other (see comments) (No per daughter)        Communication Assessment  Patient's communication style: spoken language (English or Bilingual)    Hearing Difficulty or Deaf: yes   Wear Glasses or Blind: yes    Cognitive  Cognitive/Neuro/Behavioral: WDL                      Living Environment:   People in home: alone     Current living Arrangements: house      Able to return to prior arrangements: yes     Family/Social Support:  Care provided by: self  Provides care for: no one, unable/limited ability to care for self  Marital Status:   Children          Description of Support System: Supportive    Support Assessment: Adequate family and caregiver support    Current Resources:   Patient receiving home care services: No     Community Resources: Meals on Wheels  Equipment currently used at home: cane, straight, walker, rolling, grab bar, tub/shower  Supplies currently used at home: Other (Glasses)    Employment/Financial:  Employment Status: retired        Financial Concerns: none   Referral to Financial Worker: No     Does the patient's insurance plan have a 3 day qualifying hospital stay waiver?  No    Lifestyle & Psychosocial Needs:  Social Determinants of Health     Food Insecurity: No Food Insecurity (2/28/2024)    Received from [facility]    Food Insecurity     Worried About Running Out of Food in the Last Year: 1   Depression: Not at risk (1/18/2024)    Received from [employer] [facility], [employer] [facility]    PHQ-2     PHQ-2 TOTAL SCORE: 0    Housing Stability: Low Risk  (2/28/2024)    Received from [facility]    Housing Stability     Unable to Pay for Housing in the Last Year: 1   Tobacco Use: Medium Risk (8/8/2024)    Patient History     Smoking Tobacco Use: Former     Smokeless Tobacco Use: Never     Passive Exposure: Not on file   Financial Resource Strain: Low Risk  (2/28/2024)    Received from [facility]    Financial Resource Strain     Difficulty of Paying Living Expenses: 3     Difficulty of Paying Living Expenses: Not on file   Alcohol Use: Not on file   Transportation Needs: No Transportation Needs (2/28/2024)    Received from [facility]    Transportation Needs     Lack of Transportation (Medical): 1   Physical Activity: Not on file   Interpersonal Safety: Not on file   Stress: Not on file   Social Connections: Socially Integrated (2/28/2024)    Received from [facility]    Social Connections     Frequency of Communication with Friends and Family: 0   Health Literacy: Not on file     Functional Status:  Prior to admission patient needed assistance:   Dependent ADLs:: Ambulation-walker, Ambulation-cane  Dependent IADLs:: Cleaning, Cooking, Laundry, Shopping, Meal Preparation, Transportation     Additional Information:  Writer met with patient at bedside and called patient's daughter, Rukhsana, on the phone to review role of care management services, discuss goals of care and assess need for any possible services at discharge. Rukhsana reported patient has no HCD. Lives alone in a house. Patient is independent with ADLs, but needs assistance with IADLs. Patient still drives short distances. Has DME and MOWs. Anticipate will need [facility] stretcher transport at discharge.     Snow Aldridge RN

## 2024-08-09 NOTE — PROGRESS NOTES
[facility]    Medicine Progress Note - Hospitalist Service    Date of Admission:  8/8/2024    Assessment & Plan   Sussy Cole is a 89 year old female with a pertinent history of TIA, intracranial atherosclerosis, bilateral carotid artery stenosis, anemia, hypertension, chronic obstructive pulmonary disease, chronic kidney disease stage 3, and breast cancer s/p lumpectomy who presents evaluation after a fall.  MRI with  subacute fracture T8 vertebral body without involvement of the posterior wall.  Seen by neurosurgery and is recommending nonoperative management.     # Fall  # Closed fracture of the thoracic vertebra  CT of thoracic spine results T8 vertebral fracture.   MRI results to subacute fracture T8 vertebral body without involvement of the posterior wall.     Bedrest and head of bed less than 20 degrees  Patient has large hematoma back of head with sutured laceration from ED  CT of head negative for bleed, left parietal scalp contusion laceration  CT spine cervical results: No CT evidence for acute fracture or traumatic subluxation involving the cervical spine.  Multilevel cervical spondylosis, further detailed above.   CT spine thoracic: results Largely fused thoracic spine with acute appearing obliquely oriented fracture involving the T8 vertebral body with extension of fracture line towards a bridging ventral syndesmophyte at T8-T9. No extension of fracture planes into the posterior elements.  No high-grade spinal canal or neural foraminal stenosis in the thoracic spine.   Oxycodone as needed for pain  IV Dilaudid as needed for pain  Scheduled Tylenol, consider muscle relaxant if pain is not controlled  Fall precautions  Neurosurgery recommending conservative management of thoracic vertebral fracture  -Patient is stating that she has her own brace at home which she wants to try.  Otherwise she will be fitted with TLSO.     # COPD  DuoNebs per RT 4 times daily     # Chronic  macrocytic anemia  Check iron panel, vitamin B12 and folate  Resume home ferrous sulfate     # CKD- 3  BMP results pending     # Hypertension  Monitor blood pressure  Hydralazine as needed  Resume home amlodipine  Resume home Lasix     # Hypothyroid  Resume home levothyroxine     # Hyperlipidemia  Resume atorvastatin    #Hypothermia  -Warming blanket        Diet: Regular Diet Adult    DVT Prophylaxis: Pneumatic Compression Devices  Jenkins Catheter: Not present  Lines: None     Cardiac Monitoring: None  Code Status: Full Code      Clinically Significant Risk Factors Present on Admission                # Drug Induced Platelet Defect: home medication list includes an antiplatelet medication   # Hypertension: Noted on problem list    # Anemia: based on hgb <11                    Disposition Plan     Medically Ready for Discharge: Anticipated in 2-4 Days         Jamila Casillas MD  Hospitalist Service  [facility]  Securely message with [email or web address] (more info)  Text page via [employer] Paging/Directory   ______________________________________________________________________    Interval History   Patient lying in bed.  She states she is uncomfortable the way she is.  Pain is manageable if she remains still.  Management plan discussed with the patient and she expressed understanding.    Physical Exam   Vital Signs: Temp: (!) 96.4  F (35.8  C) Temp src: Axillary BP: (!) 140/62 Pulse: 100   Resp: 19 SpO2: 92 % O2 Device: Nasal cannula (De-sats when sleeping) Oxygen Delivery: 2 LPM  Weight: 0 lbs 0 oz    General Appearance: No distress noted  Respiratory: Good air entry bilaterally  Cardiovascular: S1 and S2 are heard, no murmur or gallop  GI: Soft abdomen, no tenderness, normoactive bowel sounds  Skin: Intact and warm       Medical Decision Making       40 MINUTES SPENT BY ME on the date of service doing chart review, history, exam, documentation & further activities per the note.      Data

## 2024-08-09 NOTE — PROGRESS NOTES
"Care Management Follow Up    Length of Stay (days): 1    Expected Discharge Date: 08/10/2024     Concerns to be Addressed: Care progression - discharge planning     Patient plan of care discussed at interdisciplinary rounds: Yes    Anticipated Discharge Disposition:  TBD     Anticipated Discharge Services:  TBD  Anticipated Discharge DME:  [facility]    Patient/family educated on Medicare website which has current facility and service quality ratings:  NA  Education Provided on the Discharge Plan:  Yes per team  Patient/Family in Agreement with the Plan:  NA    Referrals Placed by CM/SW:  [facility]  Private pay costs discussed: Not applicable    Additional Information:  Per provider, Dr. Casillas, patient is not ready. No discharge.     Social Hx: \"Live alone in a house. Independent w/ADLs, but need assist w/IADLs. Still drive short distances. Has DME and MOWs. Anticipate will need [facility] stretcher transport.\"     RNCM to follow for medical progression, recommendations, and final discharge plan.     Snow Aldridge RN     "

## 2024-08-09 NOTE — TREATMENT PLAN
RCAT Treatment Plan    Patient Score: 6  Patient Acuity: 4    Clinical Indication for Therapy: home regimen    Therapy Ordered: Duoneb QID    Assessment Summary: pt here with fall. Had fracture of T8 vertebral body. She has a smoking history. RR 16-18, NPC, LS diminished. She is on 1L NC. Will continue per pts home regimen.       Curt Martin, RT  8/9/2024

## 2024-08-09 NOTE — PLAN OF CARE
Problem: Adult Inpatient Plan of Care  Goal: Absence of Hospital-Acquired Illness or Injury  Outcome: Progressing  Intervention: Identify and Manage Fall Risk  Recent Flowsheet Documentation  Taken 8/8/2024 1918 by Noe Haas RN  Safety Promotion/Fall Prevention:   clutter free environment maintained   lighting adjusted   patient and family education   room door open   safety round/check completed     Problem: Adult Inpatient Plan of Care  Goal: Optimal Comfort and Wellbeing  Outcome: Progressing     Problem: Risk for Delirium  Goal: Optimal Coping  Outcome: Progressing  Goal: Improved Behavioral Control  Outcome: Progressing  Intervention: Minimize Safety Risk  Recent Flowsheet Documentation  Taken 8/8/2024 1918 by Noe Haas RN  Enhanced Safety Measures:   pain management   review medications for side effects with activity  Goal: Improved Attention and Thought Clarity  Outcome: Progressing  Goal: Improved Sleep  Outcome: Progressing     Problem: Comorbidity Management  Goal: Maintenance of COPD Symptom Control  Outcome: Progressing  Goal: Blood Pressure in Desired Range  Outcome: Progressing     Problem: Chronic Kidney Disease  Goal: Optimal Coping with Chronic Illness  Outcome: Progressing  Goal: Electrolyte Balance  Outcome: Progressing  Goal: Fluid Balance  Outcome: Progressing  Goal: Optimal Functional Ability  Outcome: Progressing  Goal: Absence of Anemia Signs and Symptoms  Outcome: Progressing  Goal: Optimal Oral Intake  Outcome: Progressing  Goal: Acceptable Pain Control  Outcome: Progressing  Goal: Minimize Renal Failure Effects  Outcome: Progressing     Problem: Anemia  Goal: Anemia Symptom Improvement  Outcome: Progressing  Intervention: Monitor and Manage Anemia  Recent Flowsheet Documentation  Taken 8/8/2024 1918 by Noe Haas RN  Safety Promotion/Fall Prevention:   clutter free environment maintained   lighting adjusted   patient and family education   room door open   safety round/check  completed     Problem: Constipation  Goal: Effective Bowel Elimination  Outcome: Progressing     Problem: Orthopaedic Fracture  Goal: Absence of Bleeding  Outcome: Progressing  Goal: Bowel Elimination  Outcome: Progressing  Goal: Absence of Embolism Signs and Symptoms  Outcome: Progressing  Goal: Fracture Stability  Outcome: Progressing  Goal: Optimal Functional Ability  Outcome: Progressing  Goal: Absence of Infection Signs and Symptoms  Outcome: Progressing  Goal: Effective Tissue Perfusion  Outcome: Progressing  Goal: Optimal Pain Control and Function  Outcome: Progressing  Goal: Effective Oxygenation and Ventilation  Outcome: Progressing    Patient was alert and oriented. [place], but does not wear hearing aids and refused auxiliary auditory aids when offered. Patient admitted for T8 fracture after falling. Neurosurgery consulted and following. MRI completed this shift. Awaiting final plan of care and recommendations from neurosurgery. Patient remains NPO, except for medications. Hospitalist MD and NP came and assessed patient this shift. Daughter, Rukhsana, at bedside. Patient with minimal pain at rest, which increases with turning and repositioning. HOB < 20 degrees per neurosurgery orders. Patient on bedrest. Patient has head laceration; new gauze, Kerlix and Coband placed like previous dressing. Patient was unable to void and had a bladder scan of 706 mL. Patient straight cath per protocol and MD order from 725 mL brenda urine. Purewick in place.

## 2024-08-09 NOTE — PLAN OF CARE
"Pt is lethargic, but oriented x 4, calm, cooperative  - vitals stable, BP was 161/72 @0440  - complained of pain in pt's back and abdomen, bowel sounds hyperactive           - given tylenol 650 mg and oxy 2.5 mg @0105, pain improved  - pt has a purewick in place  - on STRICT bedrest, HOB MUST be 20 degrees or less  - PCDs on at 0300  - NPO since midnight  - levothyroxine rescheduled for 0400, as per pt request                    Problem: Adult Inpatient Plan of Care  Goal: Plan of Care Review  Description: The Plan of Care Review/Shift note should be completed every shift.  The Outcome Evaluation is a brief statement about your assessment that the patient is improving, declining, or no change.  This information will be displayed automatically on your shift  note.  Outcome: Progressing  Flowsheets (Taken 8/9/2024 0500)  Plan of Care Reviewed With: patient  Overall Patient Progress: improving  Goal: Patient-Specific Goal (Individualized)  Description: You can add care plan individualizations to a care plan. Examples of Individualization might be:  \"Parent requests to be called daily at 9am for status\", \"I have a hard time hearing out of my right ear\", or \"Do not touch me to wake me up as it startles  me\".  Outcome: Progressing  Goal: Absence of Hospital-Acquired Illness or Injury  Outcome: Progressing  Intervention: Identify and Manage Fall Risk  Recent Flowsheet Documentation  Taken 8/9/2024 0100 by Jennifer Gaston RN  Safety Promotion/Fall Prevention:   assistive device/personal items within reach   clutter free environment maintained   nonskid shoes/slippers when out of bed   safety round/check completed   patient and family education  Intervention: Prevent Skin Injury  Recent Flowsheet Documentation  Taken 8/9/2024 0100 by Jennifer Gaston RN  Body Position: supine  Intervention: Prevent and Manage VTE (Venous Thromboembolism) Risk  Recent Flowsheet Documentation  Taken 8/9/2024 0300 by Jennifer Gaston, " RN  VTE Prevention/Management: SCDs on (sequential compression devices)  Intervention: Prevent Infection  Recent Flowsheet Documentation  Taken 8/9/2024 0100 by Jennifer Gaston RN  Infection Prevention:   hand hygiene promoted   rest/sleep promoted   single patient room provided   equipment surfaces disinfected  Goal: Optimal Comfort and Wellbeing  Outcome: Progressing  Intervention: Monitor Pain and Promote Comfort  Recent Flowsheet Documentation  Taken 8/9/2024 0105 by Jennifer Gaston RN  Pain Management Interventions:   medication (see MAR)   quiet environment facilitated   pillow support provided  Taken 8/9/2024 0100 by Jennifer Gaston RN  Pain Management Interventions: medication (see MAR)  Goal: Readiness for Transition of Care  Outcome: Progressing     Problem: Risk for Delirium  Goal: Optimal Coping  Outcome: Progressing  Intervention: Optimize Psychosocial Adjustment to Delirium  Recent Flowsheet Documentation  Taken 8/9/2024 0100 by Jennifer Gaston RN  Supportive Measures:   active listening utilized   positive reinforcement provided   relaxation techniques promoted   verbalization of feelings encouraged  Goal: Improved Behavioral Control  Outcome: Progressing  Intervention: Prevent and Manage Agitation  Recent Flowsheet Documentation  Taken 8/9/2024 0100 by Jennifer Gaston RN  Environment Familiarity/Consistency: daily routine followed  Intervention: Minimize Safety Risk  Recent Flowsheet Documentation  Taken 8/9/2024 0100 by Jennifer Gaston RN  Communication Enhancement Strategies: call light answered in person  Enhanced Safety Measures: pain management  Goal: Improved Attention and Thought Clarity  Outcome: Progressing  Goal: Improved Sleep  Outcome: Progressing     Problem: Comorbidity Management  Goal: Maintenance of COPD Symptom Control  Outcome: Progressing  Intervention: Maintain COPD Symptom Control  Recent Flowsheet Documentation  Taken 8/9/2024 0100 by Jennifer Gaston  RN  Supportive Measures:   active listening utilized   positive reinforcement provided   relaxation techniques promoted   verbalization of feelings encouraged  Medication Review/Management: medications reviewed  Goal: Blood Pressure in Desired Range  Outcome: Progressing  Intervention: Maintain Blood Pressure Management  Recent Flowsheet Documentation  Taken 8/9/2024 0100 by Jennifer Gaston RN  Medication Review/Management: medications reviewed     Problem: Chronic Kidney Disease  Goal: Optimal Coping with Chronic Illness  Outcome: Progressing  Intervention: Support Psychosocial Response  Recent Flowsheet Documentation  Taken 8/9/2024 0100 by Jennifer Gaston RN  Supportive Measures:   active listening utilized   positive reinforcement provided   relaxation techniques promoted   verbalization of feelings encouraged  Goal: Electrolyte Balance  Outcome: Progressing  Goal: Fluid Balance  Outcome: Progressing  Goal: Optimal Functional Ability  Outcome: Progressing  Intervention: Optimize Functional Ability  Recent Flowsheet Documentation  Taken 8/9/2024 0105 by Jennifer Gaston RN  Activity Management: bedrest  Taken 8/9/2024 0100 by Jennifer Gaston RN  Activity Management: bedrest  Environment Familiarity/Consistency: daily routine followed  Goal: Absence of Anemia Signs and Symptoms  Outcome: Progressing  Intervention: Manage Signs of Anemia and Bleeding  Recent Flowsheet Documentation  Taken 8/9/2024 0100 by Jennifer Gaston RN  Bleeding Precautions: monitored for signs of bleeding  Environmental Support: calm environment promoted  Goal: Optimal Oral Intake  Outcome: Progressing  Goal: Acceptable Pain Control  Outcome: Progressing  Intervention: Prevent or Manage Pain  Recent Flowsheet Documentation  Taken 8/9/2024 0105 by Jennifer Gaston RN  Pain Management Interventions:   medication (see MAR)   quiet environment facilitated   pillow support provided  Taken 8/9/2024 0100 by Jennifer Gaston  RN  Pain Management Interventions: medication (see MAR)  Goal: Minimize Renal Failure Effects  Outcome: Progressing  Intervention: Monitor and Support Renal Function  Recent Flowsheet Documentation  Taken 8/9/2024 0100 by Jennifer Gaston RN  Medication Review/Management: medications reviewed     Problem: Anemia  Goal: Anemia Symptom Improvement  Outcome: Progressing  Intervention: Monitor and Manage Anemia  Recent Flowsheet Documentation  Taken 8/9/2024 0100 by Jennifer Gaston RN  Safety Promotion/Fall Prevention:   assistive device/personal items within reach   clutter free environment maintained   nonskid shoes/slippers when out of bed   safety round/check completed   patient and family education     Problem: Constipation  Goal: Effective Bowel Elimination  Outcome: Progressing     Problem: Orthopaedic Fracture  Goal: Absence of Bleeding  Outcome: Progressing  Goal: Bowel Elimination  Outcome: Progressing  Goal: Absence of Embolism Signs and Symptoms  Outcome: Progressing  Intervention: Prevent or Manage Embolism Risk  Recent Flowsheet Documentation  Taken 8/9/2024 0300 by Jennifer Gaston RN  VTE Prevention/Management: SCDs on (sequential compression devices)  Goal: Fracture Stability  Outcome: Progressing  Goal: Optimal Functional Ability  Outcome: Progressing  Intervention: Optimize Functional Ability  Recent Flowsheet Documentation  Taken 8/9/2024 0105 by Jennifer Gaston RN  Activity Management: bedrest  Taken 8/9/2024 0100 by Jennifer Gaston RN  Activity Management: bedrest  Positioning/Transfer Devices:   pillows   in use  Goal: Absence of Infection Signs and Symptoms  Outcome: Progressing  Goal: Effective Tissue Perfusion  Outcome: Progressing  Goal: Optimal Pain Control and Function  Outcome: Progressing  Intervention: Manage Acute Orthopaedic-Related Pain  Recent Flowsheet Documentation  Taken 8/9/2024 0105 by Jennifer Gaston RN  Pain Management Interventions:   medication (see MAR)    quiet environment facilitated   pillow support provided  Taken 8/9/2024 0100 by Jennifer Gaston RN  Pain Management Interventions: medication (see MAR)  Goal: Effective Oxygenation and Ventilation  Outcome: Progressing  Intervention: Promote Airway Secretion Clearance  Recent Flowsheet Documentation  Taken 8/9/2024 0105 by Jennifer Gaston, RN  Activity Management: bedrest  Taken 8/9/2024 0100 by Jennifer Gaston RN  Cough And Deep Breathing: done with encouragement  Activity Management: bedrest  Intervention: Optimize Oxygenation and Ventilation  Recent Flowsheet Documentation  Taken 8/9/2024 0100 by Jennifer Gaston, RN  Head of Bed (HOB) Positioning: HOB at 15 degrees   Goal Outcome Evaluation:

## 2024-08-10 ENCOUNTER — APPOINTMENT (OUTPATIENT)
Dept: RADIOLOGY | Facility: HOSPITAL | Age: 89
DRG: 552 | End: 2024-08-10
Attending: NURSE PRACTITIONER
Payer: MEDICARE

## 2024-08-10 ENCOUNTER — APPOINTMENT (OUTPATIENT)
Dept: PHYSICAL THERAPY | Facility: HOSPITAL | Age: 89
DRG: 552 | End: 2024-08-10
Attending: NURSE PRACTITIONER
Payer: MEDICARE

## 2024-08-10 LAB
ANION GAP SERPL CALCULATED.3IONS-SCNC: 9 MMOL/L (ref 7–15)
BUN SERPL-MCNC: 19 MG/DL (ref 8–23)
CALCIUM SERPL-MCNC: 8.9 MG/DL (ref 8.8–10.4)
CHLORIDE SERPL-SCNC: 98 MMOL/L (ref 98–107)
CREAT SERPL-MCNC: 0.86 MG/DL (ref 0.51–0.95)
EGFRCR SERPLBLD CKD-EPI 2021: 65 ML/MIN/1.73M2
ERYTHROCYTE [DISTWIDTH] IN BLOOD BY AUTOMATED COUNT: 14.8 % (ref 10–15)
FOLATE SERPL-MCNC: 33.3 NG/ML (ref 4.6–34.8)
GLUCOSE SERPL-MCNC: 105 MG/DL (ref 70–99)
HCO3 SERPL-SCNC: 30 MMOL/L (ref 22–29)
HCT VFR BLD AUTO: 30.1 % (ref 35–47)
HGB BLD-MCNC: 9.9 G/DL (ref 11.7–15.7)
MCH RBC QN AUTO: 36.1 PG (ref 26.5–33)
MCHC RBC AUTO-ENTMCNC: 32.9 G/DL (ref 31.5–36.5)
MCV RBC AUTO: 110 FL (ref 78–100)
PLATELET # BLD AUTO: 137 10E3/UL (ref 150–450)
POTASSIUM SERPL-SCNC: 4.3 MMOL/L (ref 3.4–5.3)
RBC # BLD AUTO: 2.74 10E6/UL (ref 3.8–5.2)
SODIUM SERPL-SCNC: 137 MMOL/L (ref 135–145)
WBC # BLD AUTO: 7.3 10E3/UL (ref 4–11)

## 2024-08-10 PROCEDURE — 250N000011 HC RX IP 250 OP 636: Performed by: INTERNAL MEDICINE

## 2024-08-10 PROCEDURE — 250N000013 HC RX MED GY IP 250 OP 250 PS 637: Performed by: INTERNAL MEDICINE

## 2024-08-10 PROCEDURE — 250N000011 HC RX IP 250 OP 636

## 2024-08-10 PROCEDURE — 99232 SBSQ HOSP IP/OBS MODERATE 35: CPT | Performed by: INTERNAL MEDICINE

## 2024-08-10 PROCEDURE — 250N000013 HC RX MED GY IP 250 OP 250 PS 637: Performed by: STUDENT IN AN ORGANIZED HEALTH CARE EDUCATION/TRAINING PROGRAM

## 2024-08-10 PROCEDURE — 94640 AIRWAY INHALATION TREATMENT: CPT | Mod: 76

## 2024-08-10 PROCEDURE — 94640 AIRWAY INHALATION TREATMENT: CPT

## 2024-08-10 PROCEDURE — 999N000157 HC STATISTIC RCP TIME EA 10 MIN

## 2024-08-10 PROCEDURE — 250N000009 HC RX 250

## 2024-08-10 PROCEDURE — 82746 ASSAY OF FOLIC ACID SERUM: CPT | Performed by: STUDENT IN AN ORGANIZED HEALTH CARE EDUCATION/TRAINING PROGRAM

## 2024-08-10 PROCEDURE — 72080 X-RAY EXAM THORACOLMB 2/> VW: CPT

## 2024-08-10 PROCEDURE — 97530 THERAPEUTIC ACTIVITIES: CPT | Mod: GP

## 2024-08-10 PROCEDURE — 97161 PT EVAL LOW COMPLEX 20 MIN: CPT | Mod: GP

## 2024-08-10 PROCEDURE — 80048 BASIC METABOLIC PNL TOTAL CA: CPT | Performed by: STUDENT IN AN ORGANIZED HEALTH CARE EDUCATION/TRAINING PROGRAM

## 2024-08-10 PROCEDURE — 36415 COLL VENOUS BLD VENIPUNCTURE: CPT | Performed by: STUDENT IN AN ORGANIZED HEALTH CARE EDUCATION/TRAINING PROGRAM

## 2024-08-10 PROCEDURE — 85014 HEMATOCRIT: CPT | Performed by: STUDENT IN AN ORGANIZED HEALTH CARE EDUCATION/TRAINING PROGRAM

## 2024-08-10 PROCEDURE — 120N000001 HC R&B MED SURG/OB

## 2024-08-10 PROCEDURE — 250N000013 HC RX MED GY IP 250 OP 250 PS 637

## 2024-08-10 RX ORDER — ONDANSETRON 2 MG/ML
4 INJECTION INTRAMUSCULAR; INTRAVENOUS EVERY 6 HOURS PRN
Status: DISCONTINUED | OUTPATIENT
Start: 2024-08-10 | End: 2024-08-16 | Stop reason: HOSPADM

## 2024-08-10 RX ADMIN — ALLOPURINOL 100 MG: 100 TABLET ORAL at 08:31

## 2024-08-10 RX ADMIN — ATORVASTATIN CALCIUM 20 MG: 10 TABLET, FILM COATED ORAL at 20:16

## 2024-08-10 RX ADMIN — ALLOPURINOL 100 MG: 100 TABLET ORAL at 20:16

## 2024-08-10 RX ADMIN — PANTOPRAZOLE SODIUM 40 MG: 40 TABLET, DELAYED RELEASE ORAL at 08:32

## 2024-08-10 RX ADMIN — IPRATROPIUM BROMIDE AND ALBUTEROL SULFATE 3 ML: .5; 3 SOLUTION RESPIRATORY (INHALATION) at 19:54

## 2024-08-10 RX ADMIN — HYDROXYZINE HYDROCHLORIDE 25 MG: 25 TABLET ORAL at 08:32

## 2024-08-10 RX ADMIN — FERROUS SULFATE TAB 325 MG (65 MG ELEMENTAL FE) 325 MG: 325 (65 FE) TAB at 08:31

## 2024-08-10 RX ADMIN — ASPIRIN 81 MG CHEWABLE TABLET 81 MG: 81 TABLET CHEWABLE at 08:31

## 2024-08-10 RX ADMIN — IPRATROPIUM BROMIDE AND ALBUTEROL SULFATE 3 ML: .5; 3 SOLUTION RESPIRATORY (INHALATION) at 07:17

## 2024-08-10 RX ADMIN — LEVOTHYROXINE SODIUM 112 MCG: 0.11 TABLET ORAL at 04:40

## 2024-08-10 RX ADMIN — ACETAMINOPHEN 975 MG: 325 TABLET ORAL at 20:15

## 2024-08-10 RX ADMIN — SENNOSIDES AND DOCUSATE SODIUM 2 TABLET: 8.6; 5 TABLET ORAL at 01:05

## 2024-08-10 RX ADMIN — IPRATROPIUM BROMIDE AND ALBUTEROL SULFATE 3 ML: .5; 3 SOLUTION RESPIRATORY (INHALATION) at 12:00

## 2024-08-10 RX ADMIN — FUROSEMIDE 20 MG: 20 TABLET ORAL at 08:31

## 2024-08-10 RX ADMIN — PANTOPRAZOLE SODIUM 40 MG: 40 TABLET, DELAYED RELEASE ORAL at 16:18

## 2024-08-10 RX ADMIN — HYDROXYZINE HYDROCHLORIDE 25 MG: 25 TABLET ORAL at 23:05

## 2024-08-10 RX ADMIN — AMLODIPINE BESYLATE 5 MG: 5 TABLET ORAL at 20:16

## 2024-08-10 RX ADMIN — ONDANSETRON 4 MG: 2 INJECTION INTRAMUSCULAR; INTRAVENOUS at 10:14

## 2024-08-10 RX ADMIN — HYDROXYZINE HYDROCHLORIDE 25 MG: 25 TABLET ORAL at 01:04

## 2024-08-10 RX ADMIN — ACETAMINOPHEN 975 MG: 325 TABLET ORAL at 08:32

## 2024-08-10 RX ADMIN — HYDROMORPHONE HYDROCHLORIDE 0.2 MG: 0.2 INJECTION, SOLUTION INTRAMUSCULAR; INTRAVENOUS; SUBCUTANEOUS at 00:59

## 2024-08-10 RX ADMIN — ACETAMINOPHEN 975 MG: 325 TABLET ORAL at 13:15

## 2024-08-10 RX ADMIN — IPRATROPIUM BROMIDE AND ALBUTEROL SULFATE 3 ML: .5; 3 SOLUTION RESPIRATORY (INHALATION) at 16:02

## 2024-08-10 RX ADMIN — HYDRALAZINE HYDROCHLORIDE 10 MG: 20 INJECTION INTRAMUSCULAR; INTRAVENOUS at 00:55

## 2024-08-10 ASSESSMENT — ACTIVITIES OF DAILY LIVING (ADL)
ADLS_ACUITY_SCORE: 35
ADLS_ACUITY_SCORE: 40
ADLS_ACUITY_SCORE: 42
ADLS_ACUITY_SCORE: 35
ADLS_ACUITY_SCORE: 42
ADLS_ACUITY_SCORE: 35
ADLS_ACUITY_SCORE: 40
ADLS_ACUITY_SCORE: 39
ADLS_ACUITY_SCORE: 35
ADLS_ACUITY_SCORE: 42
ADLS_ACUITY_SCORE: 42
ADLS_ACUITY_SCORE: 35
ADLS_ACUITY_SCORE: 35
ADLS_ACUITY_SCORE: 42
ADLS_ACUITY_SCORE: 35
ADLS_ACUITY_SCORE: 42
ADLS_ACUITY_SCORE: 35
ADLS_ACUITY_SCORE: 35
ADLS_ACUITY_SCORE: 42

## 2024-08-10 NOTE — PLAN OF CARE
Problem: Adult Inpatient Plan of Care  Goal: Plan of Care Review  Description: The Plan of Care Review/Shift note should be completed every shift.  The Outcome Evaluation is a brief statement about your assessment that the patient is improving, declining, or no change.  This information will be displayed automatically on your shift  note.  Outcome: Progressing   Goal Outcome Evaluation:       Pain controlled with tylenol. HOB <20 degrees. CMS intact. C/O anxiety and patient requesting ativan. She stated that she sometimes take it at home however it's not listed on her home med list. Order obtained for Atarax.

## 2024-08-10 NOTE — PROGRESS NOTES
Subjective     Patient's family brought her brace from home.  It does look like an appropriate brace for her current fracture.  Patient is counseled that she needs to don the brace laying down.  She is endorsing back pain, but no radiating leg pain no new numbness or tingling.    Objective     VITAL SIGNS    Temp:  [97.3  F (36.3  C)-98  F (36.7  C)] 97.9  F (36.6  C)  Pulse:  [] 100  Resp:  [18-19] 18  BP: (107-192)/(49-82) 107/49  SpO2:  [91 %-97 %] 91 %     Intake/Output Summary (Last 24 hours) at 8/9/2024 1136  Last data filed at 8/8/2024 2211  Gross per 24 hour   Intake 3 ml   Output 725 ml   Net -722 ml     I/O last 3 completed shifts:  In: -   Out: 450 [Urine:450]    PHYSICAL EXAMINATION    In bed, alert and oriented, 5/5 bilateral lowers, pain limited. Symmetric sensation to light touch. Normoreflexic.      DIAGNOSTICS  MRI thoracic from 8/8/24 demonstrates one column T8 vertebral body fracture with no posterior element involvement. ALL disruption is not well assessed in this imaging. DISH present     IMPRESSION/REPORT/PLAN:  (S22.852L) Closed fracture of eighth thoracic vertebra, unspecified fracture morphology, initial encounter (H)  (W19.XXXA) Fall, initial encounter  DISH    We have obtained baseline x-rays.  Patient may work with physical therapy and Occupational Therapy at this time.  Brace on whenever greater than 30 degrees.  Please don laying down.  No lifting greater than 10 pounds.  No repetitive twisting and bending.  We will treat her for a period of approximately 12 weeks.  We will arrange for follow-up in clinic with x-rays in 3 weeks.    Please page neurosurgery with question/concerns

## 2024-08-10 NOTE — PLAN OF CARE
Goal Outcome Evaluation:      Plan of Care Reviewed With: patient    Overall Patient Progress: improvingOverall Patient Progress: improving           Problem: Adult Inpatient Plan of Care  Goal: Plan of Care Review  Description: The Plan of Care Review/Shift note should be completed every shift.  The Outcome Evaluation is a brief statement about your assessment that the patient is improving, declining, or no change.  This information will be displayed automatically on your shift  note.  Outcome: Progressing  Flowsheets (Taken 8/10/2024 0908)  Plan of Care Reviewed With: patient  Overall Patient Progress: improving     Problem: Comorbidity Management  Goal: Blood Pressure in Desired Range  Outcome: Progressing  Intervention: Maintain Blood Pressure Management  Recent Flowsheet Documentation  Taken 8/10/2024 0100 by Maria R Mendieta RN  Medication Review/Management: medications reviewed     Problem: Orthopaedic Fracture  Goal: Fracture Stability  Outcome: Progressing       PT ON BEDREST till TLSO IS FITTED. Pain, anxiety and blood pressure was controlled with medications. Pt slept well per pt. Plan of care reviewed pt verbal understanding.

## 2024-08-10 NOTE — PROGRESS NOTES
[facility]    Medicine Progress Note - Hospitalist Service    Date of Admission:  8/8/2024    Assessment & Plan   Sussy Cloe is a 89 year old female with a pertinent history of TIA, intracranial atherosclerosis, bilateral carotid artery stenosis, anemia, hypertension, chronic obstructive pulmonary disease, chronic kidney disease stage 3, and breast cancer s/p lumpectomy who presents evaluation after a fall.  MRI with  subacute fracture T8 vertebral body without involvement of the posterior wall.  Seen by neurosurgery and is recommending nonoperative management.     # Fall  # Closed fracture of the thoracic vertebra  CT of thoracic spine results T8 vertebral fracture.   MRI results to subacute fracture T8 vertebral body without involvement of the posterior wall.     Bedrest and head of bed less than 20 degrees  Patient has large hematoma back of head with sutured laceration from ED  CT of head negative for bleed, left parietal scalp contusion laceration  CT spine cervical results: No CT evidence for acute fracture or traumatic subluxation involving the cervical spine.  Multilevel cervical spondylosis, further detailed above.   CT spine thoracic: results Largely fused thoracic spine with acute appearing obliquely oriented fracture involving the T8 vertebral body with extension of fracture line towards a bridging ventral syndesmophyte at T8-T9. No extension of fracture planes into the posterior elements.  No high-grade spinal canal or neural foraminal stenosis in the thoracic spine.   - Oxycodone and dilaudid as needed for pain  - Scheduled Tylenol, consider muscle relaxant if pain is not controlled  - Fall precautions  - Neurosurgery recommending conservative management with brace x12 weeks and outpt follow up with XR in 3 weeks  -Patient is stating that she has her own brace at home which she wants to try.  Otherwise she will be fitted with TLSO.     # COPD  DuoNebs per RT 4 times  daily     # Chronic macrocytic anemia  Resume home ferrous sulfate     # CKD- 3     # Hypertension  Monitor blood pressure  Hydralazine as needed  Resume home amlodipine  Resume home Lasix     # Hypothyroid  Resume home levothyroxine     # Hyperlipidemia  Resume atorvastatin    #Hypothermia  -Warming blanket        Diet: Regular Diet Adult    DVT Prophylaxis: Pneumatic Compression Devices  Jenkins Catheter: Not present  Lines: None     Cardiac Monitoring: None  Code Status: Full Code      Clinically Significant Risk Factors                # Thrombocytopenia: Lowest platelets = 126 in last 2 days, will monitor for bleeding   # Hypertension: Noted on problem list               # Financial/Environmental Concerns: none           Disposition Plan     Medically Ready for Discharge: Anticipated in 2-4 Days         Jayson Coelho MD  Hospitalist Service  [facility]  Securely message with [employer] (more info)  Text page via [employer] Paging/Directory   ______________________________________________________________________    Interval History   No overnight events. With ongoing pain to back and R shoulder post fall. No fevers, chills, urinary sx, N/V/D.     Physical Exam   Vital Signs: Temp: 97.9  F (36.6  C) Temp src: Oral BP: 107/49 Pulse: 100   Resp: 18 SpO2: 91 % O2 Device: None (Room air) Oxygen Delivery: 1 LPM  Weight: 191 lbs 12.8 oz    General Appearance: No distress noted  Respiratory: Good air entry bilaterally  Cardiovascular: S1 and S2 are heard, no murmur or gallop  GI: Soft abdomen, no tenderness, normoactive bowel sounds  Skin: Intact and warm       Medical Decision Making       40 MINUTES SPENT BY ME on the date of service doing chart review, history, exam, documentation & further activities per the note.      Data

## 2024-08-10 NOTE — PLAN OF CARE
Problem: Orthopaedic Fracture  Goal: Optimal Functional Ability  Intervention: Optimize Functional Ability   Goal Outcome Evaluation:             Patient alert and oriented Able to communicate her needs without difficulty. [place]. Spine x-rays completed this morning as ordered. TLSO is on and therapy ordered. Patient was able to transfer into recliner with the assist of (1) using a walker and transfer belt. Tolerated a few steps with verbal cues. Back discomfort is managed with scheduled tylenol and TLSO. Patient also complaining of right shoulder and rib discomfort. Refuses ice-packs. Message left for MD in the sticky note section. Sitting up in recliner majority of the day. Neuro-spine following. Voided large amounts on the bedside commode. Urine dark brenda in color. Encouraged increase water intake. Appetite fair, prefers sweets throughout the day. Staples intact to back of head, minimal bloody drainage. Refuses ice-packs to area. TCU at discharge.  Maria G Townsend RN

## 2024-08-10 NOTE — PROGRESS NOTES
08/10/24 1430   Appointment Info   Signing Clinician's Name / Credentials (PT) Kiara Mendez DPT   Living Environment   People in Home alone   Current Living Arrangements house   Home Accessibility stairs to enter home;stairs within home   Number of Stairs, Main Entrance 4   Stair Railings, Main Entrance railings safe and in good condition   Number of Stairs, Within Home, Primary greater than 10 stairs  (does not need to do. can stay on main level)   Stair Railings, Within Home, Primary railings safe and in good condition   Living Environment Comments dtr will be staying with pt for a month or so after hospitalization.   Self-Care   Usual Activity Tolerance good   Current Activity Tolerance fair   Equipment Currently Used at Home cane, straight;walker, rolling;grab bar, tub/shower   Fall history within last six months yes   Number of times patient has fallen within last six months 2   General Information   Onset of Illness/Injury or Date of Surgery 08/08/24   Referring Physician Mirta Hernández APRN CNP   Patient/Family Therapy Goals Statement (PT) return home   Pertinent History of Current Problem (include personal factors and/or comorbidities that impact the POC) Sussy Cole is a 89 year old female with a pertinent history of TIA, intracranial atherosclerosis, bilateral carotid artery stenosis, anemia, hypertension, chronic obstructive pulmonary disease, chronic kidney disease stage 3, and breast cancer s/p lumpectomy who presents evaluation after a fall.  MRI with  subacute fracture T8 vertebral body without involvement of the posterior wall.  Seen by neurosurgery and is recommending nonoperative management.   Existing Precautions/Restrictions brace worn when out of bed  (don brace in bed, brace to be worn when 30 degrees or higher)   Cognition   Affect/Mental Status (Cognition) WFL   Pain Assessment   Patient Currently in Pain Yes, see Vital Sign flowsheet  (anterior rib pain)   Range of  Motion (ROM)   Range of Motion ROM deficits secondary to weakness;ROM deficits secondary to pain   Strength (Manual Muscle Testing)   Strength (Manual Muscle Testing) Deficits observed during functional mobility   Bed Mobility   Bed Mobility rolling left;rolling right;supine-sit   Rolling Left [place] (Bed Mobility) moderate assist (50% patient effort);1 person assist;verbal cues   Rolling Right [place] (Bed Mobility) minimum assist (75% patient effort);moderate assist (50% patient effort);1 person assist;verbal cues   Supine-Sit [place] (Bed Mobility) moderate assist (50% patient effort);1 person assist;2 person assist   Impairments Contributing to Impaired Bed Mobility pain   Assistive Device (Bed Mobility) bed rails;draw sheet   Comment, (Bed Mobility) inc assist for pt to roll d/t pain, inc A x 2 for trunk into upright, hips towards EOB with draw sheet   Transfers   Transfers sit-stand transfer   Sit-Stand Transfer   Sit-Stand [place] (Transfers) minimum assist (75% patient effort);1 person assist   Assistive Device (Sit-Stand Transfers) walker, front-wheeled   Comment, (Sit-Stand Transfer) tends to pull on walker to stand   Gait/Stairs (Locomotion)   Comment, (Gait/Stairs) unable at this time, pain in anterior rib   Balance   Balance Comments static sitting EOB x 2 minutes CGA   Clinical Impression   Criteria for Skilled Therapeutic Intervention Yes, treatment indicated   PT Diagnosis (PT) impaired functional mobility, gait abnormality   Influenced by the following impairments decreased strength, decreased endurance   Functional limitations due to impairments gait, transfers, bed mob, stairs   Clinical Presentation (PT Evaluation Complexity) stable   Clinical Presentation Rationale pt presents as medically diagnosed   Clinical Decision Making (Complexity) low complexity   Planned Therapy Interventions (PT) balance training;bed mobility training;gait training;home exercise  program;neuromuscular re-education;patient/family education;strengthening;transfer training;stair training   Risk & Benefits of therapy have been explained evaluation/treatment results reviewed;patient   PT Total Evaluation Time   PT Eval, Low Complexity Minutes (24628) 10   Physical Therapy Goals   PT Frequency Daily   PT Predicted Duration/Target Date for Goal Attainment 08/17/24   PT Goals Bed Mobility;Transfers;Gait;Stairs   PT: Bed Mobility Supervision/stand-by assist;Supine to/from sit;Rolling;Within precautions   PT: Transfers Supervision/stand-by assist;Sit to/from stand;Bed to/from chair;Assistive device;Within precautions   PT: Gait Supervision/stand-by assist;Assistive device;Rolling walker;100 feet;Within precautions   PT: Stairs Minimal assist;4 stairs;Within precautions   Interventions   Interventions Quick Adds Therapeutic Activity   Therapeutic Activity   Therapeutic Activities: dynamic activities to improve functional performance Minutes (35832) 15   Symptoms Noted During/After Treatment Fatigue   Treatment Detail/Skilled Intervention additional sit <> stand x 1 with Ady x 1, cues for hand placement for sit <> stand, no LOB noted. Inc effort for first 50% of stand with Ady for boost into standing. Static standing x 1 minute with FWW SBA-CGA, cues for upright, no dizziness noted. EOB > chair with Ady x 1 and FWW, cues for upright and spinal precautions, short shuffling steps. Cues for longer steps bialterally. up in chair following PT, call light in hand   PT Discharge Planning   PT Plan amb w/ FWW, log roll bed mob, brace on when over 20-30 degrees HOB, transfers from various heights   PT Discharge Recommendation (DC Rec) home with assist;home with home care physical therapy   PT Rationale for DC Rec pending progress, pt likely able to d/c to home with family support. If she does not progress well and dtr unable to physically assist with mobility, pt may need TCU prior to returning home.   PT  Brief overview of current status supine > sit EOB modA x 1-2, sit <> stand x 1 with Ady and FWW, EOB > chair CGA-Ady x 1   PT Equipment Needed at Discharge walker, rolling   Total Session Time   Timed Code Treatment Minutes 15   Total Session Time (sum of timed and untimed services) 25

## 2024-08-11 LAB — HGB BLD-MCNC: 10.2 G/DL (ref 11.7–15.7)

## 2024-08-11 PROCEDURE — 120N000001 HC R&B MED SURG/OB

## 2024-08-11 PROCEDURE — 94640 AIRWAY INHALATION TREATMENT: CPT | Mod: 76

## 2024-08-11 PROCEDURE — 250N000013 HC RX MED GY IP 250 OP 250 PS 637: Performed by: INTERNAL MEDICINE

## 2024-08-11 PROCEDURE — 99232 SBSQ HOSP IP/OBS MODERATE 35: CPT | Performed by: INTERNAL MEDICINE

## 2024-08-11 PROCEDURE — 94640 AIRWAY INHALATION TREATMENT: CPT

## 2024-08-11 PROCEDURE — 999N000157 HC STATISTIC RCP TIME EA 10 MIN

## 2024-08-11 PROCEDURE — 85018 HEMOGLOBIN: CPT | Performed by: INTERNAL MEDICINE

## 2024-08-11 PROCEDURE — 250N000013 HC RX MED GY IP 250 OP 250 PS 637

## 2024-08-11 PROCEDURE — 250N000009 HC RX 250

## 2024-08-11 PROCEDURE — 250N000013 HC RX MED GY IP 250 OP 250 PS 637: Performed by: STUDENT IN AN ORGANIZED HEALTH CARE EDUCATION/TRAINING PROGRAM

## 2024-08-11 PROCEDURE — 36415 COLL VENOUS BLD VENIPUNCTURE: CPT | Performed by: INTERNAL MEDICINE

## 2024-08-11 RX ORDER — POLYETHYLENE GLYCOL 3350 17 G/17G
17 POWDER, FOR SOLUTION ORAL 2 TIMES DAILY
Status: DISCONTINUED | OUTPATIENT
Start: 2024-08-11 | End: 2024-08-16 | Stop reason: HOSPADM

## 2024-08-11 RX ORDER — POLYETHYLENE GLYCOL 3350 17 G/17G
17 POWDER, FOR SOLUTION ORAL 2 TIMES DAILY
Status: DISCONTINUED | OUTPATIENT
Start: 2024-08-11 | End: 2024-08-11

## 2024-08-11 RX ORDER — AMOXICILLIN 250 MG
1 CAPSULE ORAL 2 TIMES DAILY
Status: DISCONTINUED | OUTPATIENT
Start: 2024-08-11 | End: 2024-08-16 | Stop reason: HOSPADM

## 2024-08-11 RX ADMIN — ATORVASTATIN CALCIUM 20 MG: 10 TABLET, FILM COATED ORAL at 21:23

## 2024-08-11 RX ADMIN — IPRATROPIUM BROMIDE AND ALBUTEROL SULFATE 3 ML: .5; 3 SOLUTION RESPIRATORY (INHALATION) at 16:49

## 2024-08-11 RX ADMIN — ASPIRIN 81 MG CHEWABLE TABLET 81 MG: 81 TABLET CHEWABLE at 08:47

## 2024-08-11 RX ADMIN — IPRATROPIUM BROMIDE AND ALBUTEROL SULFATE 3 ML: .5; 3 SOLUTION RESPIRATORY (INHALATION) at 20:36

## 2024-08-11 RX ADMIN — ACETAMINOPHEN 975 MG: 325 TABLET ORAL at 08:47

## 2024-08-11 RX ADMIN — ACETAMINOPHEN 975 MG: 325 TABLET ORAL at 13:53

## 2024-08-11 RX ADMIN — PANTOPRAZOLE SODIUM 40 MG: 40 TABLET, DELAYED RELEASE ORAL at 16:02

## 2024-08-11 RX ADMIN — PANTOPRAZOLE SODIUM 40 MG: 40 TABLET, DELAYED RELEASE ORAL at 08:48

## 2024-08-11 RX ADMIN — POLYETHYLENE GLYCOL 3350 17 G: 17 POWDER, FOR SOLUTION ORAL at 15:12

## 2024-08-11 RX ADMIN — IPRATROPIUM BROMIDE AND ALBUTEROL SULFATE 3 ML: .5; 3 SOLUTION RESPIRATORY (INHALATION) at 07:02

## 2024-08-11 RX ADMIN — HYDROXYZINE HYDROCHLORIDE 25 MG: 25 TABLET ORAL at 22:51

## 2024-08-11 RX ADMIN — ACETAMINOPHEN 975 MG: 325 TABLET ORAL at 21:23

## 2024-08-11 RX ADMIN — ALLOPURINOL 100 MG: 100 TABLET ORAL at 08:48

## 2024-08-11 RX ADMIN — AMLODIPINE BESYLATE 5 MG: 5 TABLET ORAL at 21:23

## 2024-08-11 RX ADMIN — ALLOPURINOL 100 MG: 100 TABLET ORAL at 21:23

## 2024-08-11 RX ADMIN — HYDROXYZINE HYDROCHLORIDE 25 MG: 25 TABLET ORAL at 08:48

## 2024-08-11 RX ADMIN — IPRATROPIUM BROMIDE AND ALBUTEROL SULFATE 3 ML: .5; 3 SOLUTION RESPIRATORY (INHALATION) at 13:00

## 2024-08-11 RX ADMIN — FERROUS SULFATE TAB 325 MG (65 MG ELEMENTAL FE) 325 MG: 325 (65 FE) TAB at 08:48

## 2024-08-11 RX ADMIN — LEVOTHYROXINE SODIUM 112 MCG: 0.11 TABLET ORAL at 04:13

## 2024-08-11 RX ADMIN — FUROSEMIDE 20 MG: 20 TABLET ORAL at 08:47

## 2024-08-11 RX ADMIN — POLYETHYLENE GLYCOL 3350 17 G: 17 POWDER, FOR SOLUTION ORAL at 21:23

## 2024-08-11 RX ADMIN — SENNOSIDES AND DOCUSATE SODIUM 2 TABLET: 8.6; 5 TABLET ORAL at 08:55

## 2024-08-11 ASSESSMENT — ACTIVITIES OF DAILY LIVING (ADL)
ADLS_ACUITY_SCORE: 35
ADLS_ACUITY_SCORE: 33
ADLS_ACUITY_SCORE: 35
ADLS_ACUITY_SCORE: 33
ADLS_ACUITY_SCORE: 35
ADLS_ACUITY_SCORE: 33
ADLS_ACUITY_SCORE: 35

## 2024-08-11 NOTE — PROGRESS NOTES
Patient ambulated 100-150ft x3 with SBA using a rolling walker today. Gait slow and steady. Tolerated it well.   Maria G Townsend RN

## 2024-08-11 NOTE — PROGRESS NOTES
"Care Management Follow Up    Length of Stay (days): 3    Expected Discharge Date: 08/12/2024     Concerns to be Addressed: Care progression - discharge planning     Patient plan of care discussed at interdisciplinary rounds: Yes    Anticipated Discharge Disposition:  TBD     Anticipated Discharge Services:  TBD  Anticipated Discharge DME:  NA    Patient/family educated on Medicare website which has current facility and service quality ratings:  NA  Education Provided on the Discharge Plan:  Yes per team  Patient/Family in Agreement with the Plan:  NA    Referrals Placed by CM/SW:  NA  Private pay costs discussed: Not applicable    Additional Information:  Met with patient at bedside to discuss PT discharge rec for home care. Patient in agreement and would like to add HHA. She states with the brace on, \"It's going to be difficult to wash myself.\"    Referral sent    Social Hx: \"Live alone in a house. Independent w/ADLs, but need assist w/IADLs. Still drive short distances. Has DME and MOWs. Anticipate will need [facility] stretcher transport.\"     RNCM to follow for medical progression, recommendations, and final discharge plan.     Snow Aldridge RN     "

## 2024-08-11 NOTE — PLAN OF CARE
Problem: Adult Inpatient Plan of Care  Goal: Plan of Care Review  Description: The Plan of Care Review/Shift note should be completed every shift.  The Outcome Evaluation is a brief statement about your assessment that the patient is improving, declining, or no change.  This information will be displayed automatically on your shift  note.  Outcome: Progressing   Goal Outcome Evaluation:         Atarax given x1 for anxiety. Slept some in bed but then up to chair at 0230 and slept the rest of the night in the chair. Brace on. C/O abdominal discomfort and gas pain. Denied need for pain medications. O2 sats on RA upper 90's. Placed on O2 at 2L when sleeping.

## 2024-08-11 NOTE — DISCHARGE INSTRUCTIONS
Neurosurgery instructions  No lifting greater than 10 pounds  Limit bending and twisting  Brace on whenever out of bed  Apply Brace laying down  Brace may be off to shower if sitting    Follow-up neurosurgery 3 to 4 weeks with x-rays

## 2024-08-11 NOTE — PROGRESS NOTES
Medicare Annual Wellness Visit  Name: Julieta Cameron Date: 2021   MRN: [id number] Sex: Female   Age: 78 y.o. Ethnicity: Non- / Non    : 1944 Race: White (non-)      Lambert Meng is here for Medicare AWV, Hyperglycemia, and Anxiety    Screenings for behavioral, psychosocial and functional/safety risks, and cognitive dysfunction are all negative except as indicated below. These results, as well as other patient data from the [address] form, are documented in Flowsheets linked to this Encounter. Allergies   Allergen Reactions    Lovastatin Other (See Comments)     Myalgias. Prior to Visit Medications    Medication Sig Taking? Authorizing Provider   atorvastatin (LIPITOR) 40 MG tablet TAKE 1 TABLET DAILY Yes MARIO Mccormick NP   LORazepam (ATIVAN) 1 MG tablet TAKE 1 TABLET THREE TIMES A DAY AS NEEDED FOR ANXIETY Yes MARIO Mccormick NP   montelukast (SINGULAIR) 10 MG tablet TAKE 1 TABLET BY MOUTH EVERY NIGHT Yes Manford Denver, MD   vitamin D (CHOLECALCIFEROL) 25 MCG (1000 UT) TABS tablet Take 1,000 Units by mouth daily Yes Historical Provider, MD   naproxen (NAPROSYN) 500 MG tablet TAKE 1 TABLET TWICE A DAY WITH MEALS Yes Manford Denver, MD   Flaxseed, Linseed, (FLAXSEED OIL) 1000 MG CAPS Take  by mouth daily.  Yes Historical Provider, MD       Past Medical History:   Diagnosis Date    Anxiety state, unspecified     Endometrial ca ([address]     Other and unspecified hyperlipidemia        Past Surgical History:   Procedure Laterality Date    EYE SURGERY      laser surgery: both eyes     FINGER TRIGGER RELEASE      HYSTERECTOMY      abdominal: 2010[place]    TUBAL LIGATION         Family History   Problem Relation Age of Onset    High Cholesterol Mother     Cancer Mother         lung    Heart Surgery Father     Diabetes Brother        CareTeam (Including outside providers/suppliers regularly [facility]    Medicine Progress Note - Hospitalist Service    Date of Admission:  8/8/2024    Assessment & Plan   Sussy Cole is a 89 year old female with a pertinent history of TIA, intracranial atherosclerosis, bilateral carotid artery stenosis, anemia, hypertension, chronic obstructive pulmonary disease, chronic kidney disease stage 3, and breast cancer s/p lumpectomy who presents evaluation after a fall.  MRI with  subacute fracture T8 vertebral body without involvement of the posterior wall.  Seen by neurosurgery and is recommending nonoperative management.     # Fall  # Closed fracture of the thoracic vertebra  CT of thoracic spine results T8 vertebral fracture.   MRI results to subacute fracture T8 vertebral body without involvement of the posterior wall.     Bedrest and head of bed less than 20 degrees  Patient has large hematoma back of head with sutured laceration from ED  CT of head negative for bleed, left parietal scalp contusion laceration  CT spine cervical results: No CT evidence for acute fracture or traumatic subluxation involving the cervical spine.  Multilevel cervical spondylosis, further detailed above.   CT spine thoracic: results Largely fused thoracic spine with acute appearing obliquely oriented fracture involving the T8 vertebral body with extension of fracture line towards a bridging ventral syndesmophyte at T8-T9. No extension of fracture planes into the posterior elements.  No high-grade spinal canal or neural foraminal stenosis in the thoracic spine.   - Oxycodone and dilaudid as needed for pain  - Scheduled Tylenol, consider muscle relaxant if pain is not controlled  - Fall precautions  - Neurosurgery recommending conservative management with brace x12 weeks and outpt follow up with XR in 3 weeks  -Patient is stating that she has her own brace at home which she wants to try.  Otherwise she will be fitted with TLSO.  - PT recommending possible home with  home care PT, but patient won't have any family members to care for her until at least Wednesday (8/14/2024) when her daughter will be driving from [place] to live with her for 3 months.      Constipation  Last BM 8/8/2024  - Start Senakot BID PO + Miralx BID PO    # COPD  DuoNebs per RT 4 times daily     # Chronic macrocytic anemia  Resume home ferrous sulfate     # CKD- 3     # Hypertension  Monitor blood pressure  Hydralazine as needed  Resume home amlodipine  Resume home Lasix     # Hypothyroid  Resume home levothyroxine     # Hyperlipidemia  Resume atorvastatin    #Hypothermia  -Warming blanket        Diet: Regular Diet Adult    DVT Prophylaxis: Pneumatic Compression Devices  Jenkins Catheter: Not present  Lines: None     Cardiac Monitoring: None  Code Status: Full Code      Clinically Significant Risk Factors                  # Hypertension: Noted on problem list               # Financial/Environmental Concerns: none           Disposition Plan     Medically Ready for Discharge: Anticipated in 2-4 Days         Jayson Coelho MD  Hospitalist Service  [facility]  Securely message with [employer] (more info)  Text page via [employer] Paging/Directory   ______________________________________________________________________    Interval History   No overnight events. Still has not had any bowel movement since 8/8/2024 so she was informed of the need to initiate a more aggressive bowel regiment. Aside from constipation, no other new complaints outside of her ongoing back pain.    Physical Exam   Vital Signs: Temp: 97.7  F (36.5  C) Temp src: Oral BP: (!) 142/80 Pulse: 106   Resp: 19 SpO2: 90 % O2 Device: None (Room air) Oxygen Delivery: 2 LPM  Weight: 191 lbs 12.8 oz    General Appearance: No distress noted  Respiratory: Good air entry bilaterally  Cardiovascular: S1 and S2 are heard, no murmur or gallop  GI: Soft abdomen, no tenderness, normoactive bowel sounds  Skin: Intact and warm       Medical Decision  involved in providing care):   Patient Care Team:  Humble Christensen MD as PCP - General (Family Medicine)  Humble Christensen MD as PCP [facility]  Hiren Michaels MD as Consulting Physician (Obstetrics & Gynecology)    [facility] Readings from Last 3 Encounters:   12/01/21 223 lb 8 oz (101.4 kg)   05/24/21 219 lb (99.3 kg)   11/24/20 213 lb (96.6 kg)     Vitals:    12/01/21 0859   BP: 136/84   Site: Right Upper Arm   Position: Sitting   Cuff Size: Large Adult   Pulse: 84   Resp: 12   Temp: 97 °F (36.1 °C)   TempSrc: Infrared   SpO2: 98%   Weight: 223 lb 8 oz (101.4 kg)   Height: 5' 4.5\" (1.638 m)     Body mass index is 37.77 kg/m². Based upon direct observation of the patient, evaluation of cognition reveals recent and remote memory intact. Patient's complete Health Risk Assessment and screening values have been reviewed and are found in Flowsheets. The following problems were reviewed today and where indicated follow up appointments were made and/or referrals ordered. Positive Risk Factor Screenings with Interventions:          General Health and ACP:  General  In general, how would you say your health is?: Very Good  In the past 7 days, have you experienced any of the following?  New or Increased Pain, New or Increased Fatigue, Loneliness, Social Isolation, Stress or Anger?: (!) Loneliness, New or Increased Fatigue  Do you get the social and emotional support that you need?: Yes  Do you have a Living Will?: Yes  Advance Directives     Power of  Living Will ACP-Advance Directive ACP-Power of     Not on File Filed on 10/11/16 Filed Not on File      General Health Risk Interventions:  · Loneliness: See additional information regards to depression    Health Habits/Nutrition:  Health Habits/Nutrition  Do you exercise for at least 20 minutes 2-3 times per week?: (!) No  Have you lost any weight without trying in the past 3 months?: No  Do you eat only one meal per day?: No  Have Making       40 MINUTES SPENT BY ME on the date of service doing chart review, history, exam, documentation & further activities per the note.      Data      you seen the dentist within the past year?: Yes  Body mass index: (!) 37.77  Health Habits/Nutrition Interventions:  · Inadequate physical activity:  educational materials provided to promote increased physical activity    Hearing/Vision:  No exam data present  Hearing/Vision  Do you or your family notice any trouble with your hearing that hasn't been managed with hearing aids?: No  Do you have difficulty driving, watching TV, or doing any of your daily activities because of your eyesight?: No  Have you had an eye exam within the past year?: (!) No  Hearing/Vision Interventions:  · Vision concerns:  patient encouraged to make appointment with his/her eye specialist      Personalized Preventive Plan   Current Health Maintenance Status  Immunization History   Administered Date(s) Administered    COVID-19, Palafox Peter, PF, 30mcg/0.3mL 04/05/2021, 04/25/2021, 11/17/2021    Influenza Vaccine, unspecified formulation 09/21/2010    Influenza Virus Vaccine 11/03/2011, 10/19/2012    Influenza, High Dose (Fluzone 65 yrs and older) 11/08/2013, 11/10/2014, 11/02/2015, 10/26/2016, 10/24/2017, 10/29/2018    Influenza, MDCK Quadv, IM, PF (Flucelvax 2 yrs and older) 10/06/2020    Influenza, Quadv, adjuvanted, 65 yrs +, IM, PF (Fluad) 11/05/2021    Influenza, Triv, inactivated, subunit, adjuvanted, IM (Fluad 65 yrs and older) 10/30/2019    Pneumococcal Conjugate 13-valent (Cirhcjn89) 11/02/2015    Pneumococcal Polysaccharide ([id number]) 11/10/2014    Zoster Recombinant (Shingrix) 10/06/2020, 12/07/2020        Health Maintenance   Topic Date Due    Hepatitis C screen  Never done    Annual Wellness Visit (AWV)  11/25/2021    DTaP/Tdap/Td vaccine (1 - Tdap) 05/24/2022 (Originally 3/28/1963)    A1C test (Diabetic or Prediabetic)  11/23/2022    Lipid screen  11/23/2022    Colon cancer screen colonoscopy  09/30/2025    Flu vaccine  Completed    Shingles Vaccine  Completed    Pneumococcal 65+ years Vaccine  Completed    COVID-19 Vaccine  Completed    DEXA (modify frequency per FRAX score)  Addressed    Hepatitis A vaccine  Aged Out    Hepatitis B vaccine  Aged Out    Hib vaccine  Aged Out    Meningococcal (ACWY) vaccine  Aged Out     Recommendations for QR Wild Due: see orders and patient instructions/AVS.  . Recommended screening schedule for the next 5-10 years is provided to the patient in written form: see Patient Instructions/AVS.    Servando Bowling was seen today for medicare awv, hyperglycemia and anxiety. Diagnoses and all orders for this visit:    Routine general medical examination at a health care facility    Hyperglycemia  -     Comprehensive Metabolic Panel; Future  -     Hemoglobin A1C; Future    Pure hypercholesterolemia  -     Comprehensive Metabolic Panel; Future  -     Lipid Panel; Future    Endometrial ca [facility])  -     Comprehensive Metabolic Panel; Future  -     CBC;  Future    Current moderate episode of major depressive disorder without prior episode ([address]    Morbidly obese ([address]    Anxiety

## 2024-08-11 NOTE — PROGRESS NOTES
Subjective   Sitting at the bedside eating, no acute events overnight.  She is wearing her brace.  Pain well-controlled.    Objective     VITAL SIGNS    Temp:  [97.7  F (36.5  C)-98.1  F (36.7  C)] 97.7  F (36.5  C)  Pulse:  [100-108] 106  Resp:  [18-19] 19  BP: (117-142)/(58-80) 142/80  SpO2:  [89 %-99 %] 90 %     Intake/Output Summary (Last 24 hours) at 8/9/2024 1136  Last data filed at 8/8/2024 2211  Gross per 24 hour   Intake 3 ml   Output 725 ml   Net -722 ml     I/O last 3 completed shifts:  In: 600 [P.O.:600]  Out: 800 [Urine:800]    PHYSICAL EXAMINATION    alert and oriented, 5/5 bilateral lowers, pain limited. Symmetric sensation to light touch. Normoreflexic.      DIAGNOSTICS  MRI thoracic from 8/8/24 demonstrates one column T8 vertebral body fracture with no posterior element involvement. ALL disruption is not well assessed in this imaging. DISH present     IMPRESSION/REPORT/PLAN:  (S22.948A) Closed fracture of eighth thoracic vertebra, unspecified fracture morphology, initial encounter (H)  (W19.XXXA) Fall, initial encounter  DISH    Baseline x-rays done   brace on whenever greater than 30 degrees.  May be off to shower if sitting  Please don laying down.    No lifting greater than 10 pounds.    No repetitive twisting and bending.    We will treat her for a period of approximately 12 weeks.    We will arrange for follow-up in clinic with x-rays in 4  weeks orders placed.  Instructions are placed in the discharge navigator.  Neurosurgery will sign off at this time but can be available for any questions.

## 2024-08-11 NOTE — PLAN OF CARE
Problem: Adult Inpatient Plan of Care  Goal: Plan of Care Review  Outcome: Progressing   Goal Outcome Evaluation:  Patient alert and oriented x4. Pain is controlled with scheduled tylenol. Refuses prn oxycodone and ice packs. Continent of bladder, uses commode. Ambulated 100 feet with SBA with her TLSO brace on and using a rolling walker.  Gait slow and steady. PT/OT scheduled. Regular diet with good intake. Staples intact to the back of her head, ecchymosis noted to head and neck area. Prefers to sit up in chair for comfort. Patient stated her wish is to return home upon discharge.   Maria G Townsend RN

## 2024-08-12 ENCOUNTER — APPOINTMENT (OUTPATIENT)
Dept: RADIOLOGY | Facility: HOSPITAL | Age: 89
DRG: 552 | End: 2024-08-12
Attending: INTERNAL MEDICINE
Payer: MEDICARE

## 2024-08-12 ENCOUNTER — APPOINTMENT (OUTPATIENT)
Dept: PHYSICAL THERAPY | Facility: HOSPITAL | Age: 89
DRG: 552 | End: 2024-08-12
Payer: MEDICARE

## 2024-08-12 ENCOUNTER — APPOINTMENT (OUTPATIENT)
Dept: OCCUPATIONAL THERAPY | Facility: HOSPITAL | Age: 89
DRG: 552 | End: 2024-08-12
Attending: INTERNAL MEDICINE
Payer: MEDICARE

## 2024-08-12 PROCEDURE — 97530 THERAPEUTIC ACTIVITIES: CPT | Mod: GP

## 2024-08-12 PROCEDURE — 250N000013 HC RX MED GY IP 250 OP 250 PS 637

## 2024-08-12 PROCEDURE — 97166 OT EVAL MOD COMPLEX 45 MIN: CPT | Mod: GO

## 2024-08-12 PROCEDURE — 94640 AIRWAY INHALATION TREATMENT: CPT

## 2024-08-12 PROCEDURE — 250N000013 HC RX MED GY IP 250 OP 250 PS 637: Performed by: INTERNAL MEDICINE

## 2024-08-12 PROCEDURE — 97116 GAIT TRAINING THERAPY: CPT | Mod: GP

## 2024-08-12 PROCEDURE — 94640 AIRWAY INHALATION TREATMENT: CPT | Mod: 76

## 2024-08-12 PROCEDURE — 999N000157 HC STATISTIC RCP TIME EA 10 MIN

## 2024-08-12 PROCEDURE — 250N000013 HC RX MED GY IP 250 OP 250 PS 637: Performed by: STUDENT IN AN ORGANIZED HEALTH CARE EDUCATION/TRAINING PROGRAM

## 2024-08-12 PROCEDURE — 120N000001 HC R&B MED SURG/OB

## 2024-08-12 PROCEDURE — 250N000009 HC RX 250

## 2024-08-12 PROCEDURE — 71045 X-RAY EXAM CHEST 1 VIEW: CPT

## 2024-08-12 PROCEDURE — 73030 X-RAY EXAM OF SHOULDER: CPT | Mod: RT

## 2024-08-12 PROCEDURE — 99232 SBSQ HOSP IP/OBS MODERATE 35: CPT | Performed by: INTERNAL MEDICINE

## 2024-08-12 PROCEDURE — 97535 SELF CARE MNGMENT TRAINING: CPT | Mod: GO

## 2024-08-12 RX ORDER — LIDOCAINE 4 G/G
2 PATCH TOPICAL
Status: DISCONTINUED | OUTPATIENT
Start: 2024-08-12 | End: 2024-08-16 | Stop reason: HOSPADM

## 2024-08-12 RX ADMIN — SENNOSIDES AND DOCUSATE SODIUM 1 TABLET: 8.6; 5 TABLET ORAL at 21:07

## 2024-08-12 RX ADMIN — LEVOTHYROXINE SODIUM 112 MCG: 0.11 TABLET ORAL at 03:59

## 2024-08-12 RX ADMIN — ACETAMINOPHEN 975 MG: 325 TABLET ORAL at 16:01

## 2024-08-12 RX ADMIN — IPRATROPIUM BROMIDE AND ALBUTEROL SULFATE 3 ML: .5; 3 SOLUTION RESPIRATORY (INHALATION) at 19:08

## 2024-08-12 RX ADMIN — FUROSEMIDE 20 MG: 20 TABLET ORAL at 10:24

## 2024-08-12 RX ADMIN — ACETAMINOPHEN 975 MG: 325 TABLET ORAL at 21:07

## 2024-08-12 RX ADMIN — ACETAMINOPHEN 975 MG: 325 TABLET ORAL at 10:23

## 2024-08-12 RX ADMIN — IPRATROPIUM BROMIDE AND ALBUTEROL SULFATE 3 ML: .5; 3 SOLUTION RESPIRATORY (INHALATION) at 08:18

## 2024-08-12 RX ADMIN — POLYETHYLENE GLYCOL 3350 17 G: 17 POWDER, FOR SOLUTION ORAL at 21:06

## 2024-08-12 RX ADMIN — ASPIRIN 81 MG CHEWABLE TABLET 81 MG: 81 TABLET CHEWABLE at 10:24

## 2024-08-12 RX ADMIN — PANTOPRAZOLE SODIUM 40 MG: 40 TABLET, DELAYED RELEASE ORAL at 10:24

## 2024-08-12 RX ADMIN — SENNOSIDES AND DOCUSATE SODIUM 1 TABLET: 8.6; 5 TABLET ORAL at 10:23

## 2024-08-12 RX ADMIN — ALLOPURINOL 100 MG: 100 TABLET ORAL at 10:23

## 2024-08-12 RX ADMIN — IPRATROPIUM BROMIDE AND ALBUTEROL SULFATE 3 ML: .5; 3 SOLUTION RESPIRATORY (INHALATION) at 16:09

## 2024-08-12 RX ADMIN — ATORVASTATIN CALCIUM 20 MG: 10 TABLET, FILM COATED ORAL at 21:06

## 2024-08-12 RX ADMIN — ALLOPURINOL 100 MG: 100 TABLET ORAL at 21:07

## 2024-08-12 RX ADMIN — FERROUS SULFATE TAB 325 MG (65 MG ELEMENTAL FE) 325 MG: 325 (65 FE) TAB at 10:24

## 2024-08-12 RX ADMIN — PANTOPRAZOLE SODIUM 40 MG: 40 TABLET, DELAYED RELEASE ORAL at 16:01

## 2024-08-12 RX ADMIN — POLYETHYLENE GLYCOL 3350 17 G: 17 POWDER, FOR SOLUTION ORAL at 10:24

## 2024-08-12 RX ADMIN — AMLODIPINE BESYLATE 5 MG: 5 TABLET ORAL at 21:08

## 2024-08-12 NOTE — PLAN OF CARE
Problem: Adult Inpatient Plan of Care  Goal: Plan of Care Review  Description: The Plan of Care Review/Shift note should be completed every shift.  The Outcome Evaluation is a brief statement about your assessment that the patient is improving, declining, or no change.  This information will be displayed automatically on your shift  note.  Outcome: Progressing   Goal Outcome Evaluation:       Educated pt on treatment plan, pt voiced understanding.      Problem: Comorbidity Management  Goal: Blood Pressure in Desired Range  Outcome: Progressing     VSS.

## 2024-08-12 NOTE — PROGRESS NOTES
"Care Management Follow Up    Length of Stay (days): 4    Expected Discharge Date: 08/14/2024     Concerns to be Addressed: Care progression - discharge planning     Patient plan of care discussed at interdisciplinary rounds: Yes    Anticipated Discharge Disposition:  Home care     Anticipated Discharge Services:  Home care  Anticipated Discharge DME:  NA    Patient/family educated on Medicare website which has current facility and service quality ratings:  NA  Education Provided on the Discharge Plan:  Yes per team  Patient/Family in Agreement with the Plan:  NA    Referrals Placed by CM/SW:  [facility]  Private pay costs discussed: Not applicable    Additional Information:  1308 rec'd a voicemail from patient's daughter, Rukhsana. She would like to discuss discharge plans [phone].    Called Rukhsana. She wants to know why patient doesn't qualify for TCU? Rukhsana's sister, Grace, will be moving from [place], [place] on Wed to live with and assist patient, but felt this will be too much for Grace.  Rukhsana said she is upset and will not be available to assist patient.  Rukhsana reported her brother, Narinder, is also requesting patient to go to TCU.    Paged and spoke with PROSPER Craig regarding the above.    Social Hx: \"Live alone in a house. Independent w/ADLs, but need assist w/IADLs. Still drive short distances. Has DME and MOWs. Anticipate will need [facility] stretcher transport.\"     RNCM to follow for medical progression, recommendations, and final discharge plan.     Snow Aldridge RN     1500 called Rukhsana and left a message to return call.    Rukhsana called. Update given. Patient improving. Rukhsana said she has not seen patient since Saturday.  Would like to have provider call her to update.    Message sent to Dr. Salgado  "

## 2024-08-12 NOTE — PLAN OF CARE
Problem: Adult Inpatient Plan of Care  Goal: Absence of Hospital-Acquired Illness or Injury  Intervention: Identify and Manage Fall Risk  Recent Flowsheet Documentation  Taken 8/12/2024 0045 by Lew Pabon, RN  Safety Promotion/Fall Prevention:   activity supervised   assistive device/personal items within reach   clutter free environment maintained   lighting adjusted   mobility aid in reach   nonskid shoes/slippers when out of bed   safety round/check completed   supervised activity   patient and family education   room near nurse's station   room organization consistent   toileting scheduled     Problem: Adult Inpatient Plan of Care  Goal: Absence of Hospital-Acquired Illness or Injury  Intervention: Prevent Skin Injury  Recent Flowsheet Documentation  Taken 8/12/2024 0045 by Lew Pabon, RN  Body Position: position changed independently     Problem: Adult Inpatient Plan of Care  Goal: Optimal Comfort and Wellbeing  Outcome: Progressing     Problem: Adult Inpatient Plan of Care  Goal: Optimal Comfort and Wellbeing  Outcome: Progressing   Goal Outcome Evaluation:    A & O x 4, VSS, on RA, denied pain. Moved back and forth from chair to bed to chair again, unable to find comfortable sleep position. No drainage from scalp laceration. Pleasant and cooperative, back brace on.

## 2024-08-12 NOTE — PROGRESS NOTES
[facility]    PROGRESS NOTE - Hospitalist Service    ASSESSMENT AND PLAN     Active Problems:    Fall, initial encounter    Closed fracture of eighth thoracic vertebra, unspecified fracture morphology, initial encounter (H)    DISH (diffuse idiopathic skeletal hyperostosis)    Sussy Cole is a 89 year old female with a pertinent history of TIA, intracranial atherosclerosis, bilateral carotid artery stenosis, anemia, hypertension, chronic obstructive pulmonary disease, chronic kidney disease stage 3, and breast cancer s/p lumpectomy who presents evaluation after a fall.  MRI with  subacute fracture T8 vertebral body without involvement of the posterior wall.  Seen by neurosurgery and is recommending nonoperative management.     Closed fracture of the thoracic vertebra 2/2 fall   CT of thoracic spine results T8 vertebral fracture.   MRI results to subacute fracture T8 vertebral body without involvement of the posterior wall.     Bedrest and head of bed less than 20 degrees  CT of head negative for bleed, left parietal scalp contusion laceration  CT spine cervical results: No CT evidence for acute fracture or traumatic subluxation involving the cervical spine.  Multilevel cervical spondylosis, further detailed above.   CT spine thoracic: results Largely fused thoracic spine with acute appearing obliquely oriented fracture involving the T8 vertebral body with extension of fracture line towards a bridging ventral syndesmophyte at T8-T9. No extension of fracture planes into the posterior elements.  No high-grade spinal canal or neural foraminal stenosis in the thoracic spine.   - Neurosurgery recommending conservative management with brace x12 weeks and outpt follow up with XR in 3 weeks  -Patient is stating that she has her own brace at home which she wants to try.  Otherwise she will be fitted with TLSO.  - PT recommending possible home with home care PT-patient's daughter will be at her  home 8/14/2024.  Plan to discharge at that time.    Right shoulder and left rib pain likely related to fall  Shoulder x-ray and chest x-ray pending    Constipation  Last BM 8/8/2024  - Start Senakot BID PO + Miralx BID PO     # COPD  DuoNebs per RT 4 times daily     # Chronic macrocytic anemia  Resume home ferrous sulfate     # CKD- 3     # Hypertension  Monitor blood pressure  Hydralazine as needed  Resumed home amlodipine, Lasix     # Hypothyroid  Resume home levothyroxine     # Hyperlipidemia  Resume atorvastatin     #Hypothermia improved     Barriers to discharge: Safe discharge plan    Anticipated length of stay: 2 days    Medically Ready for Discharge: Ready Now    Clinically Significant Risk Factors                  # Hypertension: Noted on problem list               # Financial/Environmental Concerns: none        Subjective:  Patient resting actively in a chair.  Answering questions appropriately.  Complaining of right shoulder pain and left rib pain.    PHYSICAL EXAM  Temp:  [97.4  F (36.3  C)-98.5  F (36.9  C)] 97.8  F (36.6  C)  Pulse:  [] 90  Resp:  [16-22] 18  BP: (122-134)/(58-80) 134/62  SpO2:  [91 %-95 %] 93 %  Wt Readings from Last 1 Encounters:   08/09/24 87 kg (191 lb 12.8 oz)       Intake/Output Summary (Last 24 hours) at 8/12/2024 1253  Last data filed at 8/11/2024 1837  Gross per 24 hour   Intake 560 ml   Output --   Net 560 ml      Body mass index is 35.07 kg/m .    GENRL: Alert and answering questions appropriately. Not in acute distress. Sitting in a chair   CHEST: Breathing easily   EXTRM: No pedal edema  NEURO: Following commands   PSYCH: Normal affect and mood.   INTGM: No skin rash    Medical Decision Making       35 MINUTES SPENT BY ME on the date of service doing chart review, history, exam, documentation & further activities per the note.      PERTINENT LABS/IMAGING:  Results for orders placed or performed during the hospital encounter of 08/08/24   Head CT w/o contrast     Impression    IMPRESSION:    1.  No evidence of acute traumatic intracranial abnormality.  2.  Left parietal scalp contusion/laceration. No subjacent calvarial fracture.  3.  Chronic changes as above.   CT Cervical Spine w/o Contrast    Impression    IMPRESSION:  CERVICAL SPINE CT:  1.  No CT evidence for acute fracture or traumatic subluxation involving the cervical spine.  2.  Multilevel cervical spondylosis, further detailed above.    THORACIC SPINE CT:  1.  Largely fused thoracic spine with acute appearing obliquely oriented fracture involving the T8 vertebral body with extension of fracture line towards a bridging ventral syndesmophyte at T8-T9. No extension of fracture planes into the posterior   elements.  2.  No high-grade spinal canal or neural foraminal stenosis in the thoracic spine.    Findings were discussed via telephone with Dr. French by myself at 3:35 PM CDT on 82,024.   CT Thoracic Spine w/o Contrast    Impression    IMPRESSION:  CERVICAL SPINE CT:  1.  No CT evidence for acute fracture or traumatic subluxation involving the cervical spine.  2.  Multilevel cervical spondylosis, further detailed above.    THORACIC SPINE CT:  1.  Largely fused thoracic spine with acute appearing obliquely oriented fracture involving the T8 vertebral body with extension of fracture line towards a bridging ventral syndesmophyte at T8-T9. No extension of fracture planes into the posterior   elements.  2.  No high-grade spinal canal or neural foraminal stenosis in the thoracic spine.    Findings were discussed via telephone with Dr. French by myself at 3:35 PM CDT on 82,024.   CT Lumbar Spine w/o Contrast    Impression    IMPRESSION:  1.  Minimal age indeterminate progression of a moderate L2 vertebral body compression fracture.   2.  Unchanged severe L4-5 canal stenosis.   Thoracic spine MRI w/o contrast    Impression    IMPRESSION:  1.  Acute to subacute fracture extending through the T8 vertebral body without involvement of  "the posterior wall. Possible involvement of the anterior longitudinal ligament, difficult to assess.  2.  Chronic L2 vertebral body compression deformity without definite acute/subacute component, somewhat difficult to assess on this study. Recommend dedicated lumbar spine MRI if there is continued concern.   XR Thoracic Lumbar Standing 2 Views    Impression    IMPRESSION: Compression fracture findings involve the superior endplate of L2 with slight bony retropulsion. The T8 fracture findings are better delineated on MRI and no new listhesis findings identified. Are not well seen on the plain film. Diffuse bony   osteopenic changes.      Most Recent 3 CBC's:  Recent Labs   Lab Test 08/11/24  0611 08/10/24  0721 08/09/24  0504 08/08/24 2005   WBC  --  7.3 6.8 9.1   HGB 10.2* 9.9* 10.2* 9.7*   MCV  --  110* 112* 111*   PLT  --  137* 126* 127*     Most Recent 3 BMP's:  Recent Labs   Lab Test 08/10/24  0721 08/09/24  0504 08/08/24 2005    139 140   POTASSIUM 4.3 4.6 4.6   CHLORIDE 98 102 102   CO2 30* 28 26   BUN 19.0 23.2* 27.1*   CR 0.86 1.01* 1.48*   ANIONGAP 9 9 12   CARMELA 8.9 8.6* 8.9   * 123* 119*     Most Recent 2 LFT's:  Recent Labs   Lab Test 06/02/21  1333 03/29/20  0428   AST 13 24   ALT 11 26   ALKPHOS 97 50   BILITOTAL 1.1* 3.2*       Recent Labs   Lab Test 03/22/22  0936   TRIG 53     No results for input(s): \"LDL\" in the last 83586 hours.  Recent Labs   Lab Test 08/10/24  0721      POTASSIUM 4.3   CHLORIDE 98   CO2 30*   *   BUN 19.0   CR 0.86   GFRESTIMATED 65   CARMELA 8.9     No results for input(s): \"A1C\" in the last 55515 hours.  Recent Labs   Lab Test 08/11/24  0611 08/10/24  0721 08/09/24  0504   HGB 10.2* 9.9* 10.2*     Recent Labs   Lab Test 03/26/20  1447   TROPONINI <0.01     Recent Labs   Lab Test 04/30/21  0947   BNP 30     Recent Labs   Lab Test 03/26/20  2132   TSH 1.20     Recent Labs   Lab Test 12/24/21  0022 03/29/20  0941 03/26/20  1447   INR 0.95 1.11* 1.05 "       Dana Salgado DO  Hospitalist Service  [facility]

## 2024-08-12 NOTE — PROGRESS NOTES
Occupational Therapy       08/12/24 0900   Appointment Info   Signing Clinician's Name / Credentials (OT) Jeny Zhu OTR/L   Living Environment   People in Home alone   Current Living Arrangements house   Home Accessibility stairs to enter home;stairs within home   Number of Stairs, Main Entrance 4   Stair Railings, Main Entrance railings safe and in good condition   Number of Stairs, Within Home, Primary   (patient can live on main level)   Stair Railings, Within Home, Primary railings safe and in good condition   Living Environment Comments Daughter is coming Wed 8/14 to stay with patient   Self-Care   Equipment Currently Used at Home cane, straight;walker, rolling;grab bar, tub/shower   Fall history within last six months yes   Number of times patient has fallen within last six months 2   Activity/Exercise/Self-Care Comment Patient ambulates outside house with walker and inside with a cane or furniture walks. Patient has raised toilet seat   General Information   Onset of Illness/Injury or Date of Surgery 08/08/24   Referring Physician Jayson Coelho MD   Patient/Family Therapy Goal Statement (OT) none stated   Additional Occupational Profile Info/Pertinent History of Current Problem Sussy Cole is a 89 year old female with a pertinent history of TIA, intracranial atherosclerosis, bilateral carotid artery stenosis, anemia, hypertension, chronic obstructive pulmonary disease, chronic kidney disease stage 3, and breast cancer s/p lumpectomy who presents evaluation after a fall.  MRI with  subacute fracture T8 vertebral body without involvement of the posterior wall.  Seen by neurosurgery and is recommending nonoperative management.   Existing Precautions/Restrictions spinal;brace worn when out of bed   Cognitive Status Examination   Orientation Status orientation to person, place and time   Range of Motion Comprehensive   General Range of Motion no range of motion deficits identified   Strength  Comprehensive (MMT)   General Manual Muscle Testing (MMT) Assessment no strength deficits identified   Transfers   Transfers sit-stand transfer   Sit-Stand Transfer   Sit-Stand [place] (Transfers) contact guard   Sit/Stand Transfer Comments Increased effort-patient uses lift chair at home   Balance   Balance Comments Reviewed spinal precautions (no bending, lifting, twisting) with patient. Patient frustrated with restrictions, OT encouraged patient to discuss with medical team.   Activities of Daily Living   BADL Assessment/Intervention lower body dressing;upper body dressing;toileting   Upper Body Dressing Assessment/Training   Comment, (Upper Body Dressing) Brace   [place] Level (Upper Body Dressing) maximum assist (25% patient effort)   Lower Body Dressing Assessment/Training   [place] Level (Lower Body Dressing) maximum assist (25% patient effort)   Toileting   Comment, (Toileting) Per clinical judgement, patient will need increased assistance due to need for assistancde with transfers and dressing.   Clinical Impression   Criteria for Skilled Therapeutic Interventions Met (OT) Yes, treatment indicated   OT Diagnosis Decreased ADL independence   OT Problem List-Impairments impacting ADL problems related to;activity tolerance impaired   Assessment of Occupational Performance 3-5 Performance Deficits   Identified Performance Deficits dressing, trsfs, toileting   Planned Therapy Interventions (OT) ADL retraining;balance training;bed mobility training;transfer training   Clinical Decision Making Complexity (OT) detailed assessment/moderate complexity   Risk & Benefits of therapy have been explained evaluation/treatment results reviewed;care plan/treatment goals reviewed   OT Total Evaluation Time   OT Eval, Moderate Complexity Minutes (50636) 15   OT Goals   Therapy Frequency (OT) Daily   OT Predicted Duration/Target Date for Goal Attainment 08/19/24   OT Goals Toilet Transfer/Toileting;Lower Body  Dressing   OT: Lower Body Dressing Modified independent;using adaptive equipment   OT: Toilet Transfer/Toileting Independent   Self-Care/Home Management   Self-Care/Home Mgmt/ADL, Compensatory, Meal Prep Minutes (50082) 10   Treatment Detail/Skilled Intervention Eval completed, Patient ambulated to BR with FWW and SBA. Completed toileting task with Min A. Patient has RTS, discussed getting grab bars or commode to go over commode to assist with trsfs on/off commode. OT demonstrated correct LE dressing technique (patient has reacher) but states she is unsure if she will use it. OT emphasize importance of following spinal precautions. Patient handoff to PT in hallway.   OT Discharge Planning   OT Plan Bring sock-aid and reacher for dressing, trsfs   OT Discharge Recommendation (DC Rec) home with assist;home with home care occupational therapy   OT Rationale for DC Rec Patient moving with SBA and daughter will stay with her starting Wednesday. Patient would benefit from home care services to ensure safety and reduce fall risk at home.   OT Brief overview of current status CGA for trsfs, Max A for dressing   Total Session Time   Timed Code Treatment Minutes 10   Total Session Time (sum of timed and untimed services) 25

## 2024-08-13 ENCOUNTER — APPOINTMENT (OUTPATIENT)
Dept: CT IMAGING | Facility: HOSPITAL | Age: 89
DRG: 552 | End: 2024-08-13
Payer: MEDICARE

## 2024-08-13 ENCOUNTER — APPOINTMENT (OUTPATIENT)
Dept: OCCUPATIONAL THERAPY | Facility: HOSPITAL | Age: 89
DRG: 552 | End: 2024-08-13
Payer: MEDICARE

## 2024-08-13 ENCOUNTER — APPOINTMENT (OUTPATIENT)
Dept: PHYSICAL THERAPY | Facility: HOSPITAL | Age: 89
DRG: 552 | End: 2024-08-13
Payer: MEDICARE

## 2024-08-13 ENCOUNTER — APPOINTMENT (OUTPATIENT)
Dept: RADIOLOGY | Facility: HOSPITAL | Age: 89
DRG: 552 | End: 2024-08-13
Payer: MEDICARE

## 2024-08-13 PROCEDURE — 94640 AIRWAY INHALATION TREATMENT: CPT | Mod: 76

## 2024-08-13 PROCEDURE — 250N000011 HC RX IP 250 OP 636

## 2024-08-13 PROCEDURE — 250N000013 HC RX MED GY IP 250 OP 250 PS 637

## 2024-08-13 PROCEDURE — 250N000013 HC RX MED GY IP 250 OP 250 PS 637: Performed by: INTERNAL MEDICINE

## 2024-08-13 PROCEDURE — 97530 THERAPEUTIC ACTIVITIES: CPT | Mod: GP

## 2024-08-13 PROCEDURE — 250N000013 HC RX MED GY IP 250 OP 250 PS 637: Performed by: STUDENT IN AN ORGANIZED HEALTH CARE EDUCATION/TRAINING PROGRAM

## 2024-08-13 PROCEDURE — 999N000157 HC STATISTIC RCP TIME EA 10 MIN

## 2024-08-13 PROCEDURE — 94640 AIRWAY INHALATION TREATMENT: CPT

## 2024-08-13 PROCEDURE — 70450 CT HEAD/BRAIN W/O DYE: CPT | Mod: ME

## 2024-08-13 PROCEDURE — 250N000009 HC RX 250

## 2024-08-13 PROCEDURE — 120N000001 HC R&B MED SURG/OB

## 2024-08-13 PROCEDURE — 99232 SBSQ HOSP IP/OBS MODERATE 35: CPT | Performed by: INTERNAL MEDICINE

## 2024-08-13 PROCEDURE — 73030 X-RAY EXAM OF SHOULDER: CPT | Mod: RT

## 2024-08-13 PROCEDURE — 97535 SELF CARE MNGMENT TRAINING: CPT | Mod: GO

## 2024-08-13 PROCEDURE — 97116 GAIT TRAINING THERAPY: CPT | Mod: GP

## 2024-08-13 PROCEDURE — 73070 X-RAY EXAM OF ELBOW: CPT | Mod: RT

## 2024-08-13 RX ORDER — LORAZEPAM 2 MG/ML
0.5 INJECTION INTRAMUSCULAR ONCE
Status: COMPLETED | OUTPATIENT
Start: 2024-08-13 | End: 2024-08-13

## 2024-08-13 RX ORDER — AMOXICILLIN 250 MG
1 CAPSULE ORAL 2 TIMES DAILY
Status: DISCONTINUED | OUTPATIENT
Start: 2024-08-13 | End: 2024-08-13

## 2024-08-13 RX ORDER — BISACODYL 10 MG
10 SUPPOSITORY, RECTAL RECTAL DAILY PRN
Status: DISCONTINUED | OUTPATIENT
Start: 2024-08-13 | End: 2024-08-16 | Stop reason: HOSPADM

## 2024-08-13 RX ORDER — LORAZEPAM 0.5 MG/1
0.5 TABLET ORAL ONCE
Status: COMPLETED | OUTPATIENT
Start: 2024-08-13 | End: 2024-08-14

## 2024-08-13 RX ADMIN — FERROUS SULFATE TAB 325 MG (65 MG ELEMENTAL FE) 325 MG: 325 (65 FE) TAB at 08:07

## 2024-08-13 RX ADMIN — PANTOPRAZOLE SODIUM 40 MG: 40 TABLET, DELAYED RELEASE ORAL at 08:07

## 2024-08-13 RX ADMIN — ASPIRIN 81 MG CHEWABLE TABLET 81 MG: 81 TABLET CHEWABLE at 08:07

## 2024-08-13 RX ADMIN — ACETAMINOPHEN 650 MG: 325 TABLET ORAL at 18:40

## 2024-08-13 RX ADMIN — Medication 1 TABLET: at 20:47

## 2024-08-13 RX ADMIN — POLYETHYLENE GLYCOL 3350 17 G: 17 POWDER, FOR SOLUTION ORAL at 20:51

## 2024-08-13 RX ADMIN — ATORVASTATIN CALCIUM 20 MG: 10 TABLET, FILM COATED ORAL at 20:47

## 2024-08-13 RX ADMIN — LEVOTHYROXINE SODIUM 112 MCG: 0.11 TABLET ORAL at 04:28

## 2024-08-13 RX ADMIN — POLYETHYLENE GLYCOL 3350 17 G: 17 POWDER, FOR SOLUTION ORAL at 08:07

## 2024-08-13 RX ADMIN — FUROSEMIDE 20 MG: 20 TABLET ORAL at 08:07

## 2024-08-13 RX ADMIN — SENNOSIDES AND DOCUSATE SODIUM 1 TABLET: 8.6; 5 TABLET ORAL at 20:47

## 2024-08-13 RX ADMIN — ACETAMINOPHEN 975 MG: 325 TABLET ORAL at 20:47

## 2024-08-13 RX ADMIN — MAGNESIUM HYDROXIDE 30 ML: 400 SUSPENSION ORAL at 09:02

## 2024-08-13 RX ADMIN — HYDROMORPHONE HYDROCHLORIDE 0.2 MG: 0.2 INJECTION, SOLUTION INTRAMUSCULAR; INTRAVENOUS; SUBCUTANEOUS at 22:32

## 2024-08-13 RX ADMIN — IPRATROPIUM BROMIDE AND ALBUTEROL SULFATE 3 ML: .5; 3 SOLUTION RESPIRATORY (INHALATION) at 15:20

## 2024-08-13 RX ADMIN — IPRATROPIUM BROMIDE AND ALBUTEROL SULFATE 3 ML: .5; 3 SOLUTION RESPIRATORY (INHALATION) at 19:11

## 2024-08-13 RX ADMIN — BISACODYL 10 MG: 10 SUPPOSITORY RECTAL at 10:49

## 2024-08-13 RX ADMIN — IPRATROPIUM BROMIDE AND ALBUTEROL SULFATE 3 ML: .5; 3 SOLUTION RESPIRATORY (INHALATION) at 07:29

## 2024-08-13 RX ADMIN — LIDOCAINE 2 PATCH: 4 PATCH TOPICAL at 08:06

## 2024-08-13 RX ADMIN — Medication 1 TABLET: at 10:49

## 2024-08-13 RX ADMIN — ALLOPURINOL 100 MG: 100 TABLET ORAL at 08:07

## 2024-08-13 RX ADMIN — ALLOPURINOL 100 MG: 100 TABLET ORAL at 20:47

## 2024-08-13 RX ADMIN — AMLODIPINE BESYLATE 5 MG: 5 TABLET ORAL at 20:47

## 2024-08-13 RX ADMIN — ACETAMINOPHEN 975 MG: 325 TABLET ORAL at 08:07

## 2024-08-13 RX ADMIN — ACETAMINOPHEN 975 MG: 325 TABLET ORAL at 13:50

## 2024-08-13 RX ADMIN — SENNOSIDES AND DOCUSATE SODIUM 1 TABLET: 8.6; 5 TABLET ORAL at 08:07

## 2024-08-13 RX ADMIN — PANTOPRAZOLE SODIUM 40 MG: 40 TABLET, DELAYED RELEASE ORAL at 18:40

## 2024-08-13 ASSESSMENT — ACTIVITIES OF DAILY LIVING (ADL)
ADLS_ACUITY_SCORE: 31
ADLS_ACUITY_SCORE: 32
ADLS_ACUITY_SCORE: 32
ADLS_ACUITY_SCORE: 31

## 2024-08-13 NOTE — SIGNIFICANT EVENT
Significant Event Note    Time of event: 4:46 PM August 13, 2024    Description of event:  Paged regarding a fall  Patient admitted for mechanical fall with resultant T-spine closed fracture    Per nursing, patient was walking in [place] when she suddenly tipped over and fell  Fell onto her right shoulder, right elbow, and hit the right side of her head as well  Did not lose consciousness, does not complain about chest pain or shortness of breath prior to onset  Patient does not appear to have slipped  Aside from some significant shoulder pain and a little bit of pain on the right side of her head, is not complaining about any acute distress  Seems to remember most of the event, denies syncope, chest pain, shortness of breath, palpitations    Vital signs stable  Neurologically intact, large area of erythema and fluctuance on the right side consistent with a fall  Left side of cranium where previous laceration was appears to be clean and dry, nontender  Palpable sulcus sign on the right, strength 5 out of 5, neurologically intact, pulse intact  Cardiac and pulmonary exams unremarkable    Plan:  Plan for stat CT head, right shoulder and elbow x-rays to evaluate.  Suspect that if she dislocated her right shoulder, she will need it relocated.  Given patient does not quite remember what happened during the fall, also order EKG to rule out cardiac etiology.  Will order 0.5mg IV Ativan for head CT per patient request. Will sign this patient out to my oncoming colleague.      Discussed with: bedside nurse    Jessica Ellis MD

## 2024-08-13 NOTE — PROGRESS NOTES
[facility]    PROGRESS NOTE - Hospitalist Service    ASSESSMENT AND PLAN     Active Problems:    Fall, initial encounter    Closed fracture of eighth thoracic vertebra, unspecified fracture morphology, initial encounter (H)    DISH (diffuse idiopathic skeletal hyperostosis)    Sussy Cole is a 89 year old female with a pertinent history of TIA, intracranial atherosclerosis, bilateral carotid artery stenosis, anemia, hypertension, chronic obstructive pulmonary disease, chronic kidney disease stage 3, and breast cancer s/p lumpectomy who presents evaluation after a fall.  MRI with  subacute fracture T8 vertebral body without involvement of the posterior wall.  Seen by neurosurgery and is recommending nonoperative management.     Closed fracture of the thoracic vertebra 2/2 fall   CT of thoracic spine results T8 vertebral fracture.   MRI results to subacute fracture T8 vertebral body without involvement of the posterior wall.     Bedrest and head of bed less than 20 degrees  CT of head negative for bleed, left parietal scalp contusion laceration-plan to remove staples 8/14 prior to discharge  CT spine cervical results: No CT evidence for acute fracture or traumatic subluxation involving the cervical spine.  Multilevel cervical spondylosis, further detailed above.   CT spine thoracic: results Largely fused thoracic spine with acute appearing obliquely oriented fracture involving the T8 vertebral body with extension of fracture line towards a bridging ventral syndesmophyte at T8-T9. No extension of fracture planes into the posterior elements.  No high-grade spinal canal or neural foraminal stenosis in the thoracic spine.   - Neurosurgery recommending conservative management with brace x12 weeks and outpt follow up with XR in 3 weeks  -Patient is stating that she has her own brace at home which she wants to try.    - PT recommending possible home with home care PT-patient's daughter will be at  her home 8/14/2024.  Plan to discharge at that time.  Per PT okay to shower without brace in place while sitting on a shower chair.     Right shoulder and left rib pain likely related to fall  Shoulder x-ray and chest x-ray without acute fractures.     Constipation  Last BM 8/8/2024  - Start Senakot BID PO + Miralx BID PO.  Milk of magnesia  Dulcolax suppository and enema ordered as needed     # COPD  DuoNebs per RT 4 times daily     # Chronic macrocytic anemia  Resume home ferrous sulfate     # CKD- 3     # Hypertension  Monitor blood pressure  Hydralazine as needed  Resumed home amlodipine, Lasix     # Hypothyroid  Resume home levothyroxine     # Hyperlipidemia  Resume atorvastatin     #Hypothermia improved      Barriers to discharge: Safe discharge plan     Anticipated length of stay: 1 days       Medically Ready for Discharge: Ready Now    Clinically Significant Risk Factors                  # Hypertension: Noted on problem list               # Financial/Environmental Concerns: none        Subjective:  Patient sitting in chair.  Very concerned about not having a bowel movement for almost a week.  No abdominal discomfort.  No nausea.    PHYSICAL EXAM  Temp:  [97.7  F (36.5  C)-97.9  F (36.6  C)] 97.8  F (36.6  C)  Pulse:  [93-99] 97  Resp:  [18-20] 18  BP: (115-125)/(54-58) 125/58  SpO2:  [92 %-94 %] 94 %  Wt Readings from Last 1 Encounters:   08/09/24 87 kg (191 lb 12.8 oz)       Intake/Output Summary (Last 24 hours) at 8/13/2024 1342  Last data filed at 8/13/2024 0900  Gross per 24 hour   Intake 678 ml   Output 550 ml   Net 128 ml      Body mass index is 35.07 kg/m .    GENRL: Alert and answering questions appropriately. Not in acute distress. Sitting in a chair   CHEST: Breathing easily   EXTRM: No pedal edema  NEURO: Following commands   PSYCH: Normal affect and mood.   INTGM: No skin rash    Medical Decision Making       35 MINUTES SPENT BY ME on the date of service doing chart review, history, exam,  documentation & further activities per the note.      PERTINENT LABS/IMAGING:  Results for orders placed or performed during the hospital encounter of 08/08/24   Head CT w/o contrast    Impression    IMPRESSION:    1.  No evidence of acute traumatic intracranial abnormality.  2.  Left parietal scalp contusion/laceration. No subjacent calvarial fracture.  3.  Chronic changes as above.   CT Cervical Spine w/o Contrast    Impression    IMPRESSION:  CERVICAL SPINE CT:  1.  No CT evidence for acute fracture or traumatic subluxation involving the cervical spine.  2.  Multilevel cervical spondylosis, further detailed above.    THORACIC SPINE CT:  1.  Largely fused thoracic spine with acute appearing obliquely oriented fracture involving the T8 vertebral body with extension of fracture line towards a bridging ventral syndesmophyte at T8-T9. No extension of fracture planes into the posterior   elements.  2.  No high-grade spinal canal or neural foraminal stenosis in the thoracic spine.    Findings were discussed via telephone with Dr. French by myself at 3:35 PM CDT on 82,024.   CT Thoracic Spine w/o Contrast    Impression    IMPRESSION:  CERVICAL SPINE CT:  1.  No CT evidence for acute fracture or traumatic subluxation involving the cervical spine.  2.  Multilevel cervical spondylosis, further detailed above.    THORACIC SPINE CT:  1.  Largely fused thoracic spine with acute appearing obliquely oriented fracture involving the T8 vertebral body with extension of fracture line towards a bridging ventral syndesmophyte at T8-T9. No extension of fracture planes into the posterior   elements.  2.  No high-grade spinal canal or neural foraminal stenosis in the thoracic spine.    Findings were discussed via telephone with Dr. French by myself at 3:35 PM CDT on 82,024.   CT Lumbar Spine w/o Contrast    Impression    IMPRESSION:  1.  Minimal age indeterminate progression of a moderate L2 vertebral body compression fracture.   2.  Unchanged  "severe L4-5 canal stenosis.   Thoracic spine MRI w/o contrast    Impression    IMPRESSION:  1.  Acute to subacute fracture extending through the T8 vertebral body without involvement of the posterior wall. Possible involvement of the anterior longitudinal ligament, difficult to assess.  2.  Chronic L2 vertebral body compression deformity without definite acute/subacute component, somewhat difficult to assess on this study. Recommend dedicated lumbar spine MRI if there is continued concern.   XR Thoracic Lumbar Standing 2 Views    Impression    IMPRESSION: Compression fracture findings involve the superior endplate of L2 with slight bony retropulsion. The T8 fracture findings are better delineated on MRI and no new listhesis findings identified. Are not well seen on the plain film. Diffuse bony   osteopenic changes.    XR Chest Port 1 View    Impression    IMPRESSION: No acute cardiopulmonary abnormality. Old bilateral humeral fractures.   XR Shoulder Right Port G/E 2 Views    Impression    IMPRESSION: No acute fracture or malalignment. Severe glenohumeral and acromioclavicular joint degenerative changes with bone-on-bone articulation and osteophytosis. Osteopenia. Chronic lung changes. Atherosclerosis.     Most Recent 3 CBC's:  Recent Labs   Lab Test 08/11/24  0611 08/10/24  0721 08/09/24  0504 08/08/24 2005   WBC  --  7.3 6.8 9.1   HGB 10.2* 9.9* 10.2* 9.7*   MCV  --  110* 112* 111*   PLT  --  137* 126* 127*     Most Recent 3 BMP's:  Recent Labs   Lab Test 08/10/24  0721 08/09/24  0504 08/08/24 2005    139 140   POTASSIUM 4.3 4.6 4.6   CHLORIDE 98 102 102   CO2 30* 28 26   BUN 19.0 23.2* 27.1*   CR 0.86 1.01* 1.48*   ANIONGAP 9 9 12   CARMELA 8.9 8.6* 8.9   * 123* 119*     Most Recent 2 LFT's:  Recent Labs   Lab Test 06/02/21  1333 03/29/20  0428   AST 13 24   ALT 11 26   ALKPHOS 97 50   BILITOTAL 1.1* 3.2*       Recent Labs   Lab Test 03/22/22  0936   TRIG 53     No results for input(s): \"LDL\" in the " "last 10933 hours.  Recent Labs   Lab Test 08/10/24  0721      POTASSIUM 4.3   CHLORIDE 98   CO2 30*   *   BUN 19.0   CR 0.86   GFRESTIMATED 65   CARMELA 8.9     No results for input(s): \"A1C\" in the last 01517 hours.  Recent Labs   Lab Test 08/11/24  0611 08/10/24  0721 08/09/24  0504   HGB 10.2* 9.9* 10.2*     Recent Labs   Lab Test 03/26/20  1447   TROPONINI <0.01     Recent Labs   Lab Test 04/30/21  0947   BNP 30     Recent Labs   Lab Test 03/26/20  2132   TSH 1.20     Recent Labs   Lab Test 12/24/21  0022 03/29/20  0941 03/26/20  1447   INR 0.95 1.11* 1.05       Dana Salgado, DO  Hospitalist Service  [facility]      "

## 2024-08-13 NOTE — SIGNIFICANT EVENT
Significant Event Note    Time of event: 6:45 PM August 13, 2024    Description of event:  Following up on imaging results ordered by Dr. Ellis after mechanical fall onto R side.     XR R shoulder showed acute mildly displaced fx of R distal clavicle near AC joint and possible comminuted scapular fx.     CT head negative for intracranial process. XR elbow neg for acute fx.     Plan:  Appears to be a type 1 distal clavicle fx - conservative management w/pain control and immobilization with sling for now. Will reach out to ortho for further recs and possible consult.     CT R shoulder ordered to r/o possible scapular fx per radiology recs.       Honorbiju Wright MD    7:38 PM   Addendum  Contacted by PA from [facility]. Agreed w/plan above. They will plan to see patient tomorrow morning for further monitoring/recs. Consult order placed.     Also updated by RN that patient complaining of shoulder pain w/movement, which is expected given clavicle fx. Remains neurovascularly intact.

## 2024-08-13 NOTE — PLAN OF CARE
Problem: Adult Inpatient Plan of Care  Goal: Optimal Comfort and Wellbeing  Outcome: Progressing  Intervention: Monitor Pain and Promote Comfort  Recent Flowsheet Documentation  Taken 8/13/2024 0400 by Dana Cleaning RN  Pain Management Interventions:   declines   emotional support   repositioned   Goal Outcome Evaluation:  No new events overnight.  Reported pain all over though declined pain meds.  Slept in chair overnight.  Up to bathroom with assist of 1.  Brace on when up in chair.

## 2024-08-13 NOTE — PLAN OF CARE
"  Problem: Adult Inpatient Plan of Care  Goal: Plan of Care Review  Description: The Plan of Care Review/Shift note should be completed every shift.  The Outcome Evaluation is a brief statement about your assessment that the patient is improving, declining, or no change.  This information will be displayed automatically on your shift  note.  Outcome: Progressing  Goal: Patient-Specific Goal (Individualized)  Description: You can add care plan individualizations to a care plan. Examples of Individualization might be:  \"Parent requests to be called daily at 9am for status\", \"I have a hard time hearing out of my right ear\", or \"Do not touch me to wake me up as it startles  me\".  Outcome: Progressing  Goal: Absence of Hospital-Acquired Illness or Injury  Outcome: Progressing  Intervention: Prevent Skin Injury  Recent Flowsheet Documentation  Taken 8/12/2024 2132 by Waldo Hernández RN  Body Position: sitting up in bed  Taken 8/12/2024 1932 by Waldo Hernández RN  Body Position: sitting up in bed  Taken 8/12/2024 1732 by Waldo Hernández RN  Body Position: sitting up in bed  Taken 8/12/2024 1532 by Waldo Hernández RN  Body Position: sitting up in bed  Intervention: Prevent and Manage VTE (Venous Thromboembolism) Risk  Recent Flowsheet Documentation  Taken 8/12/2024 1603 by Waldo Hernández RN  VTE Prevention/Management: SCDs off (sequential compression devices)  Intervention: Prevent Infection  Recent Flowsheet Documentation  Taken 8/12/2024 1603 by Waldo Hernández RN  Infection Prevention: hand hygiene promoted  Goal: Optimal Comfort and Wellbeing  Outcome: Progressing  Goal: Readiness for Transition of Care  Outcome: Progressing     Problem: Risk for Delirium  Goal: Optimal Coping  Outcome: Progressing  Goal: Improved Behavioral Control  Outcome: Progressing  Goal: Improved Attention and Thought Clarity  Outcome: Progressing  Goal: Improved Sleep  Outcome: Progressing     Problem: Comorbidity " Management  Goal: Maintenance of COPD Symptom Control  Outcome: Progressing  Goal: Blood Pressure in Desired Range  Outcome: Progressing     Problem: Chronic Kidney Disease  Goal: Optimal Coping with Chronic Illness  Outcome: Progressing  Goal: Electrolyte Balance  Outcome: Progressing  Goal: Fluid Balance  Outcome: Progressing  Goal: Optimal Functional Ability  Outcome: Progressing  Goal: Absence of Anemia Signs and Symptoms  Outcome: Progressing  Goal: Optimal Oral Intake  Outcome: Progressing  Goal: Acceptable Pain Control  Outcome: Progressing  Goal: Minimize Renal Failure Effects  Outcome: Progressing     Problem: Anemia  Goal: Anemia Symptom Improvement  Outcome: Progressing     Problem: Constipation  Goal: Effective Bowel Elimination  Outcome: Progressing     Problem: Orthopaedic Fracture  Goal: Absence of Bleeding  Outcome: Progressing  Goal: Bowel Elimination  Outcome: Progressing  Goal: Absence of Embolism Signs and Symptoms  Outcome: Progressing  Intervention: Prevent or Manage Embolism Risk  Recent Flowsheet Documentation  Taken 8/12/2024 1603 by Waldo Hernández RN  VTE Prevention/Management: SCDs off (sequential compression devices)  Goal: Fracture Stability  Outcome: Progressing  Goal: Optimal Functional Ability  Outcome: Progressing  Intervention: Optimize Functional Ability  Recent Flowsheet Documentation  Taken 8/12/2024 2132 by Waldo Hernández RN  Positioning/Transfer Devices:   in use   pillows  Taken 8/12/2024 1732 by Waldo Hernández RN  Positioning/Transfer Devices:   pillows   in use  Taken 8/12/2024 1532 by Waldo Hernández RN  Positioning/Transfer Devices:   pillows   in use  Goal: Absence of Infection Signs and Symptoms  Outcome: Progressing  Goal: Effective Tissue Perfusion  Outcome: Progressing  Goal: Optimal Pain Control and Function  Outcome: Progressing  Goal: Effective Oxygenation and Ventilation  Outcome: Progressing  Intervention: Promote Airway Secretion  Clearance  Recent Flowsheet Documentation  Taken 8/12/2024 1603 by Waldo Hernández RN  Cough And Deep Breathing: done independently per patient  Intervention: Optimize Oxygenation and Ventilation  Recent Flowsheet Documentation  Taken 8/12/2024 1932 by Waldo Hernández RN  Head of Bed (HOB) Positioning: HOB at 60-90 degrees  Taken 8/12/2024 1732 by Waldo Hernández RN  Head of Bed (HOB) Positioning: HOB at 60-90 degrees  Taken 8/12/2024 1532 by Waldo Hernández RN  Head of Bed (HOB) Positioning: HOB at 60-90 degrees   Goal Outcome Evaluation:       Pt was up in the chair during the shift, minimal pain with movement and scheduled acetaminophen was given and it was effective per pt. Back brace was on at all times when she was out of the bed, VSS and will continue to monitor.

## 2024-08-13 NOTE — PLAN OF CARE
Problem: Adult Inpatient Plan of Care  Goal: Absence of Hospital-Acquired Illness or Injury  Intervention: Identify and Manage Fall Risk     Goal Outcome Evaluation:       Patient alert and oriented x4. Able to communicate needs without difficulty. Back pain is managed with scheduled tylenol and TLSO brace. SBA with ambulation using rolling walker.  Ambulated x2 today. Gait is slow and steady. PT/OT scheduled. Complained of constipation and received miralax, senna, MOM, prune juice and dulcolax suppository this morning.  Has had soft brown stool x2. Voiding adequate amounts of urine.  Regular diet with good intake. Staples intact to back of her head. Bruised sacral area. Refuses ice-packs. Home with family at discharge.  Maria G Townsend RN

## 2024-08-14 ENCOUNTER — APPOINTMENT (OUTPATIENT)
Dept: PHYSICAL THERAPY | Facility: HOSPITAL | Age: 89
DRG: 552 | End: 2024-08-14
Payer: MEDICARE

## 2024-08-14 ENCOUNTER — APPOINTMENT (OUTPATIENT)
Dept: OCCUPATIONAL THERAPY | Facility: HOSPITAL | Age: 89
DRG: 552 | End: 2024-08-14
Payer: MEDICARE

## 2024-08-14 ENCOUNTER — APPOINTMENT (OUTPATIENT)
Dept: CT IMAGING | Facility: HOSPITAL | Age: 89
DRG: 552 | End: 2024-08-14
Payer: MEDICARE

## 2024-08-14 LAB — GLUCOSE BLDC GLUCOMTR-MCNC: 126 MG/DL (ref 70–99)

## 2024-08-14 PROCEDURE — 97535 SELF CARE MNGMENT TRAINING: CPT | Mod: GO

## 2024-08-14 PROCEDURE — 97530 THERAPEUTIC ACTIVITIES: CPT | Mod: GP

## 2024-08-14 PROCEDURE — 250N000013 HC RX MED GY IP 250 OP 250 PS 637

## 2024-08-14 PROCEDURE — 272N000202 HC AEROBIKA WITH MANOMETER

## 2024-08-14 PROCEDURE — 94799 UNLISTED PULMONARY SVC/PX: CPT

## 2024-08-14 PROCEDURE — 250N000013 HC RX MED GY IP 250 OP 250 PS 637: Performed by: INTERNAL MEDICINE

## 2024-08-14 PROCEDURE — 97164 PT RE-EVAL EST PLAN CARE: CPT | Mod: GP

## 2024-08-14 PROCEDURE — 999N000157 HC STATISTIC RCP TIME EA 10 MIN

## 2024-08-14 PROCEDURE — 94640 AIRWAY INHALATION TREATMENT: CPT

## 2024-08-14 PROCEDURE — 99233 SBSQ HOSP IP/OBS HIGH 50: CPT | Performed by: INTERNAL MEDICINE

## 2024-08-14 PROCEDURE — 250N000013 HC RX MED GY IP 250 OP 250 PS 637: Performed by: STUDENT IN AN ORGANIZED HEALTH CARE EDUCATION/TRAINING PROGRAM

## 2024-08-14 PROCEDURE — 94640 AIRWAY INHALATION TREATMENT: CPT | Mod: 76

## 2024-08-14 PROCEDURE — 73200 CT UPPER EXTREMITY W/O DYE: CPT | Mod: RT,ME

## 2024-08-14 PROCEDURE — 250N000009 HC RX 250

## 2024-08-14 PROCEDURE — 999N000156 HC STATISTIC RCP CONSULT EA 30 MIN

## 2024-08-14 PROCEDURE — 120N000001 HC R&B MED SURG/OB

## 2024-08-14 RX ADMIN — LORAZEPAM 0.5 MG: 0.5 TABLET ORAL at 07:47

## 2024-08-14 RX ADMIN — IPRATROPIUM BROMIDE AND ALBUTEROL SULFATE 3 ML: .5; 3 SOLUTION RESPIRATORY (INHALATION) at 07:26

## 2024-08-14 RX ADMIN — ALLOPURINOL 100 MG: 100 TABLET ORAL at 08:23

## 2024-08-14 RX ADMIN — ACETAMINOPHEN 975 MG: 325 TABLET ORAL at 08:23

## 2024-08-14 RX ADMIN — LIDOCAINE 2 PATCH: 4 PATCH TOPICAL at 08:23

## 2024-08-14 RX ADMIN — POLYETHYLENE GLYCOL 3350 17 G: 17 POWDER, FOR SOLUTION ORAL at 08:23

## 2024-08-14 RX ADMIN — IPRATROPIUM BROMIDE AND ALBUTEROL SULFATE 3 ML: .5; 3 SOLUTION RESPIRATORY (INHALATION) at 12:14

## 2024-08-14 RX ADMIN — PANTOPRAZOLE SODIUM 40 MG: 40 TABLET, DELAYED RELEASE ORAL at 07:35

## 2024-08-14 RX ADMIN — HYDROXYZINE HYDROCHLORIDE 25 MG: 25 TABLET ORAL at 02:47

## 2024-08-14 RX ADMIN — ATORVASTATIN CALCIUM 20 MG: 10 TABLET, FILM COATED ORAL at 20:31

## 2024-08-14 RX ADMIN — POLYETHYLENE GLYCOL 3350 17 G: 17 POWDER, FOR SOLUTION ORAL at 20:38

## 2024-08-14 RX ADMIN — IPRATROPIUM BROMIDE AND ALBUTEROL SULFATE 3 ML: .5; 3 SOLUTION RESPIRATORY (INHALATION) at 16:38

## 2024-08-14 RX ADMIN — LEVOTHYROXINE SODIUM 112 MCG: 0.11 TABLET ORAL at 04:24

## 2024-08-14 RX ADMIN — SENNOSIDES AND DOCUSATE SODIUM 1 TABLET: 8.6; 5 TABLET ORAL at 08:23

## 2024-08-14 RX ADMIN — FUROSEMIDE 20 MG: 20 TABLET ORAL at 08:23

## 2024-08-14 RX ADMIN — AMLODIPINE BESYLATE 5 MG: 5 TABLET ORAL at 21:01

## 2024-08-14 RX ADMIN — ACETAMINOPHEN 975 MG: 325 TABLET ORAL at 13:06

## 2024-08-14 RX ADMIN — ASPIRIN 81 MG CHEWABLE TABLET 81 MG: 81 TABLET CHEWABLE at 08:23

## 2024-08-14 RX ADMIN — ACETAMINOPHEN 975 MG: 325 TABLET ORAL at 20:31

## 2024-08-14 RX ADMIN — IPRATROPIUM BROMIDE AND ALBUTEROL SULFATE 3 ML: .5; 3 SOLUTION RESPIRATORY (INHALATION) at 20:52

## 2024-08-14 RX ADMIN — SENNOSIDES AND DOCUSATE SODIUM 1 TABLET: 8.6; 5 TABLET ORAL at 20:31

## 2024-08-14 RX ADMIN — ALLOPURINOL 100 MG: 100 TABLET ORAL at 20:31

## 2024-08-14 RX ADMIN — OXYCODONE HYDROCHLORIDE 5 MG: 5 TABLET ORAL at 02:47

## 2024-08-14 RX ADMIN — PANTOPRAZOLE SODIUM 40 MG: 40 TABLET, DELAYED RELEASE ORAL at 17:17

## 2024-08-14 RX ADMIN — FERROUS SULFATE TAB 325 MG (65 MG ELEMENTAL FE) 325 MG: 325 (65 FE) TAB at 08:23

## 2024-08-14 RX ADMIN — Medication 1 TABLET: at 20:32

## 2024-08-14 RX ADMIN — Medication 1 TABLET: at 08:23

## 2024-08-14 ASSESSMENT — ACTIVITIES OF DAILY LIVING (ADL)
ADLS_ACUITY_SCORE: 44
ADLS_ACUITY_SCORE: 31
ADLS_ACUITY_SCORE: 40
ADLS_ACUITY_SCORE: 44
ADLS_ACUITY_SCORE: 44
ADLS_ACUITY_SCORE: 31
ADLS_ACUITY_SCORE: 40
ADLS_ACUITY_SCORE: 31
ADLS_ACUITY_SCORE: 44
ADLS_ACUITY_SCORE: 44
ADLS_ACUITY_SCORE: 40
ADLS_ACUITY_SCORE: 44
ADLS_ACUITY_SCORE: 40
ADLS_ACUITY_SCORE: 44
ADLS_ACUITY_SCORE: 42
ADLS_ACUITY_SCORE: 44
ADLS_ACUITY_SCORE: 31
ADLS_ACUITY_SCORE: 44
ADLS_ACUITY_SCORE: 40

## 2024-08-14 NOTE — PROGRESS NOTES
[facility]    PROGRESS NOTE - Hospitalist Service    ASSESSMENT AND PLAN     Active Problems:    Fall, initial encounter    Closed fracture of eighth thoracic vertebra, unspecified fracture morphology, initial encounter (H)    DISH (diffuse idiopathic skeletal hyperostosis)    Sussy Cole is a 89 year old female with a pertinent history of TIA, intracranial atherosclerosis, bilateral carotid artery stenosis, anemia, hypertension, chronic obstructive pulmonary disease, chronic kidney disease stage 3, and breast cancer s/p lumpectomy who presents evaluation after a fall.  MRI with  subacute fracture T8 vertebral body without involvement of the posterior wall.  Seen by neurosurgery and is recommending nonoperative management.    Acute fall 8/13  New displaced distal clavicle fracture along with possible scapular fracture  Evaluated by orthopedic surgery 8/14 with plans to follow-up in 2 weeks with a shoulder specialist-nonoperative management  Appreciate PT reevaluation and consideration for TCU placement  Updated patient's son Ray over the phone and answered all questions.     Closed fracture of the thoracic vertebra 2/2 fall   CT of thoracic spine results T8 vertebral fracture.   MRI results to subacute fracture T8 vertebral body without involvement of the posterior wall.     Bedrest and head of bed less than 20 degrees  CT of head negative for bleed, left parietal scalp contusion laceration-plan to remove staples 8/15 in the afternoon   CT spine cervical results: No CT evidence for acute fracture or traumatic subluxation involving the cervical spine.  Multilevel cervical spondylosis, further detailed above.   CT spine thoracic: results Largely fused thoracic spine with acute appearing obliquely oriented fracture involving the T8 vertebral body with extension of fracture line towards a bridging ventral syndesmophyte at T8-T9. No extension of fracture planes into the posterior elements.   No high-grade spinal canal or neural foraminal stenosis in the thoracic spine.   - Neurosurgery recommending conservative management with brace x12 weeks and outpt follow up with XR in 3 weeks  -Patient is stating that she has her own brace at home which she wants to try.    -8/13 PT recommending possible home with home care PT     Right shoulder and left rib pain likely related to fall  8/12 Shoulder x-ray and chest x-ray without acute fractures.     Acute kidney injury  Resolved    Constipation  Last BM 8/8/2024  - Start Senakot BID PO + Miralx BID PO.  Milk of magnesia  Dulcolax suppository and enema ordered as needed     # COPD  DuoNebs per RT 4 times daily     # Chronic macrocytic anemia  Resume home ferrous sulfate     # CKD- 3     # Hypertension  Monitor blood pressure  Hydralazine as needed  Resumed home amlodipine, Lasix     # Hypothyroid  Resume home levothyroxine     # Hyperlipidemia  Resume atorvastatin     #Hypothermia improved      Barriers to discharge: Safe discharge plan     Anticipated length of stay: 1-2 more days    Medically Ready for Discharge: Anticipated Tomorrow    Patient with acute fall in the afternoon of 8/13.  Apparently patient's children were updated.  I discussed case with patient's son Narinder over the phone 8/14.  He was in agreement with reevaluation with physical therapy and possible TCU placement.  Family seems not comfortable with patient going home alone due to her frequent falls.  I will plan to have physical therapy reevaluate patient.    Clinically Significant Risk Factors                  # Hypertension: Noted on problem list               # Financial/Environmental Concerns: none        Subjective:  Patient resting comfortably in a chair.  States she feels silly for following.  Agreeable to work with physical therapy.  No other complaints.    PHYSICAL EXAM  Temp:  [97.5  F (36.4  C)-98.2  F (36.8  C)] 98.2  F (36.8  C)  Pulse:  [] 97  Resp:  [18] 18  BP:  (108-152)/(55-78) 117/55  SpO2:  [90 %-98 %] 90 %  Wt Readings from Last 1 Encounters:   08/09/24 87 kg (191 lb 12.8 oz)       Intake/Output Summary (Last 24 hours) at 8/14/2024 1109  Last data filed at 8/14/2024 0733  Gross per 24 hour   Intake 240 ml   Output 350 ml   Net -110 ml      Body mass index is 35.07 kg/m .    GENRL: Alert and answering questions appropriately. Not in acute distress. Sitting in a chair   CHEST: Breathing easily   EXTRM: No pedal edema.  Right clavicle with erythema noted and palpable broken bone.  Right arm in a sling.  NEURO: Following commands   PSYCH: Normal affect and mood.   INTGM: No skin rash    Medical Decision Making       55 MINUTES SPENT BY ME on the date of service doing chart review, history, exam, documentation & further activities per the note.      PERTINENT LABS/IMAGING:  Results for orders placed or performed during the hospital encounter of 08/08/24   Head CT w/o contrast    Impression    IMPRESSION:    1.  No evidence of acute traumatic intracranial abnormality.  2.  Left parietal scalp contusion/laceration. No subjacent calvarial fracture.  3.  Chronic changes as above.   CT Cervical Spine w/o Contrast    Impression    IMPRESSION:  CERVICAL SPINE CT:  1.  No CT evidence for acute fracture or traumatic subluxation involving the cervical spine.  2.  Multilevel cervical spondylosis, further detailed above.    THORACIC SPINE CT:  1.  Largely fused thoracic spine with acute appearing obliquely oriented fracture involving the T8 vertebral body with extension of fracture line towards a bridging ventral syndesmophyte at T8-T9. No extension of fracture planes into the posterior   elements.  2.  No high-grade spinal canal or neural foraminal stenosis in the thoracic spine.    Findings were discussed via telephone with Dr. French by myself at 3:35 PM CDT on 82,024.   CT Thoracic Spine w/o Contrast    Impression    IMPRESSION:  CERVICAL SPINE CT:  1.  No CT evidence for acute  fracture or traumatic subluxation involving the cervical spine.  2.  Multilevel cervical spondylosis, further detailed above.    THORACIC SPINE CT:  1.  Largely fused thoracic spine with acute appearing obliquely oriented fracture involving the T8 vertebral body with extension of fracture line towards a bridging ventral syndesmophyte at T8-T9. No extension of fracture planes into the posterior   elements.  2.  No high-grade spinal canal or neural foraminal stenosis in the thoracic spine.    Findings were discussed via telephone with Dr. French by myself at 3:35 PM CDT on 82,024.   CT Lumbar Spine w/o Contrast    Impression    IMPRESSION:  1.  Minimal age indeterminate progression of a moderate L2 vertebral body compression fracture.   2.  Unchanged severe L4-5 canal stenosis.   Thoracic spine MRI w/o contrast    Impression    IMPRESSION:  1.  Acute to subacute fracture extending through the T8 vertebral body without involvement of the posterior wall. Possible involvement of the anterior longitudinal ligament, difficult to assess.  2.  Chronic L2 vertebral body compression deformity without definite acute/subacute component, somewhat difficult to assess on this study. Recommend dedicated lumbar spine MRI if there is continued concern.   XR Thoracic Lumbar Standing 2 Views    Impression    IMPRESSION: Compression fracture findings involve the superior endplate of L2 with slight bony retropulsion. The T8 fracture findings are better delineated on MRI and no new listhesis findings identified. Are not well seen on the plain film. Diffuse bony   osteopenic changes.    XR Chest Port 1 View    Impression    IMPRESSION: No acute cardiopulmonary abnormality. Old bilateral humeral fractures.   XR Shoulder Right Port G/E 2 Views    Impression    IMPRESSION: No acute fracture or malalignment. Severe glenohumeral and acromioclavicular joint degenerative changes with bone-on-bone articulation and osteophytosis. Osteopenia. Chronic  lung changes. Atherosclerosis.   CT Head w/o Contrast    Impression    IMPRESSION:  1.  New small right parietal scalp hematoma without acute intracranial process.  2.  Decreased size of the left parietal scalp hematoma since 8/8/2024.   XR Elbow Right 2 Views    Impression    IMPRESSION: Bones are demineralized. No acute displaced elbow fracture identified. No joint effusion. Joint spaces are maintained.   XR Shoulder Right 2 Views    Impression    IMPRESSION: Bones are demineralized. Acute mildly displaced fracture of the right distal clavicle near the AC joint. There is a curvilinear ossific density and bony irregularity along the superior margin of the scapula, and there appears to be offset   along the superior scapular body/spine region. Findings raise concern for a comminuted scapular fracture and CT of the shoulder is recommended for further evaluation.    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 No definitive glenohumeral dislocation there is some inferior positioning of the humeral head with   respect to the glenoid which appears similar to the 2/22/2024 study.     NOTE: ABNORMAL REPORT    THE DICTATION ABOVE DESCRIBES AN ABNORMALITY FOR WHICH FOLLOW-UP IS NEEDED.    CT Shoulder Right w/o Contrast    Impression    IMPRESSION:  1.  Mildly comminuted but not significantly displaced fracture of the distal clavicle.  2.  Old healed fracture of the proximal humerus with residual posttraumatic deformity.  3.  Superimposed degenerative change at both the glenohumeral and acromioclavicular joints.  4.  Additional nondisplaced fracture of the supraspinous portion of the scapula.  5.  Slight soft tissue swelling surrounding the clavicular fracture but no evidence for organized hematoma.  6.  Exam otherwise negative.       Most Recent 3 CBC's:  Recent Labs   Lab Test 08/11/24  0611 08/10/24  0721  "08/09/24  0504 08/08/24 2005   WBC  --  7.3 6.8 9.1   HGB 10.2* 9.9* 10.2* 9.7*   MCV  --  110* 112* 111*   PLT  --  137* 126* 127*     Most Recent 3 BMP's:  Recent Labs   Lab Test 08/10/24  0721 08/09/24  0504 08/08/24 2005    139 140   POTASSIUM 4.3 4.6 4.6   CHLORIDE 98 102 102   CO2 30* 28 26   BUN 19.0 23.2* 27.1*   CR 0.86 1.01* 1.48*   ANIONGAP 9 9 12   CARMELA 8.9 8.6* 8.9   * 123* 119*     Most Recent 2 LFT's:  Recent Labs   Lab Test 06/02/21  1333 03/29/20  0428   AST 13 24   ALT 11 26   ALKPHOS 97 50   BILITOTAL 1.1* 3.2*       Recent Labs   Lab Test 03/22/22  0936   TRIG 53     No results for input(s): \"LDL\" in the last 56289 hours.  Recent Labs   Lab Test 08/10/24  0721      POTASSIUM 4.3   CHLORIDE 98   CO2 30*   *   BUN 19.0   CR 0.86   GFRESTIMATED 65   CARMELA 8.9     No results for input(s): \"A1C\" in the last 22836 hours.  Recent Labs   Lab Test 08/11/24  0611 08/10/24  0721 08/09/24  0504   HGB 10.2* 9.9* 10.2*     Recent Labs   Lab Test 03/26/20  1447   TROPONINI <0.01     Recent Labs   Lab Test 04/30/21  0947   BNP 30     Recent Labs   Lab Test 03/26/20  2132   TSH 1.20     Recent Labs   Lab Test 12/24/21  0022 03/29/20  0941 03/26/20  1447   INR 0.95 1.11* 1.05       Dana Salgado, DO  Hospitalist Service  [facility]      "

## 2024-08-14 NOTE — PLAN OF CARE
Goal Outcome Evaluation:                    Problem: Adult Inpatient Plan of Care  Goal: Optimal Comfort and Wellbeing  Outcome: Progressing     Pt rated pain in shoulder a 3/10. Reported pain to be focused on right clavicle. Sling in place on arm.   No raised area felt on back right side of head.   Pt reported pain in back with movement, requesting PRN pain medication. IV placement needed prior to medication administration.   CT ordered for further assessment of shoulder, pt requesting prn antianxiety medication to be administered prior to CT; order obtained.

## 2024-08-14 NOTE — PLAN OF CARE
"Goal Outcome Evaluation:      Plan of Care Reviewed With: patient    Overall Patient Progress: no changeOverall Patient Progress: no change       Problem: Adult Inpatient Plan of Care  Goal: Patient-Specific Goal (Individualized)  Description: You can add care plan individualizations to a care plan. Examples of Individualization might be:  \"Parent requests to be called daily at 9am for status\", \"I have a hard time hearing out of my right ear\", or \"Do not touch me to wake me up as it startles  me\".  Outcome: Progressing   Goal is to not fall again \"I've fallen twice in the past week!\"    Problem: Risk for Delirium  Goal: Improved Attention and Thought Clarity  Outcome: Progressing   Is alert and orientated x4. No confusion, able to make needs known.    Problem: Comorbidity Management  Goal: Maintenance of COPD Symptom Control  Outcome: Progressing   Continues to be on room air, lungs are clear, no shortness of breath.     Problem: Chronic Kidney Disease  Goal: Optimal Oral Intake  Outcome: Progressing   Is eating and drinking in good amounts. Did c/o constipation earlier but did report she had 2 bowel movements today. Has prune juice on her tray as needed.     Problem: Chronic Kidney Disease  Goal: Acceptable Pain Control  Outcome: Progressing   Had a fall this evening. Complains of additional shoulder pain on the right side. Pain is 10 with movement, 3-4 at rest. Did offer pain medication prn, she reports since she was constipated, she's afraid to take more pain medications and risk getting \"bound\" up. Did accept tylenol, will continue to monitor. Orthopedic doctor to evaluate patient tonight for new fracture.         "

## 2024-08-14 NOTE — CONSULTS
[facility] ([facility])  SPIRITUAL ASSESSMENT Progress Note  [facility]. [address]    REFERRAL SOURCE: Consult - DANIELA Hi (pr. Gerardo-marlen - emphasis on second syllable) was in a significant amount of pain. Nonetheless she was an engaging conversationalist. We spoke about her current Sikh and  (please DO NOT contact)  her janice journey with her  and the upcoming election and federal politics.     She was  to a [employer]ism who  19 years ago.  She has found her spiritual home in the [employer] Sikh ([employer]).  She derives meaning from her [employer]regation janice which is helping her deal with the disappointment she is experiencing since injuring her arm and losing a great deal of independence.      PLAN: Sussy is very open to SHS visits. Shs will follow as able during this hospitalization.      Rebekah Bashir, Ph.D., [facility]      SHS available  for emergency requests/referrals, either by having the on-call  paged or by entering an ASAP/STAT consult in Epic (this will also page the on-call ).

## 2024-08-14 NOTE — PLAN OF CARE
Problem: Orthopaedic Fracture  Goal: Fracture Stability  Outcome: Progressing     Problem: Orthopaedic Fracture  Goal: Optimal Pain Control and Function  Outcome: Progressing     Problem: Orthopaedic Fracture  Goal: Effective Oxygenation and Ventilation  Outcome: Progressing  Intervention: Promote Airway Secretion Clearance  Recent Flowsheet Documentation  Taken 8/14/2024 0200 by Ivana Ellis RN  Cough And Deep Breathing: done independently per patient  Activity Management:   activity adjusted per tolerance   activity encouraged   up ad scott   up in chair     Problem: Fall Injury Risk  Goal: Absence of Fall and Fall-Related Injury  Outcome: Progressing  Intervention: Identify and Manage Contributors  Recent Flowsheet Documentation  Taken 8/14/2024 0200 by Ivana Ellis RN  Medication Review/Management: medications reviewed  Intervention: Promote Injury-Free Environment  Recent Flowsheet Documentation  Taken 8/14/2024 0200 by Ivana Ellis RN  Safety Promotion/Fall Prevention:   activity supervised   assistive device/personal items within reach   clutter free environment maintained   lighting adjusted   nonskid shoes/slippers when out of bed   room door open   safety round/check completed     Goal Outcome Evaluation:  Pt is alert and oriented x4. TLSO brace on. Right arm slang on. Purewick in place. Pain managed with PRN 5 mg oxycodone and 25 mg Atrarax. Pt sleeping in recliner. Waiting to go down for CT.

## 2024-08-14 NOTE — PROGRESS NOTES
Respiratory Care Note    Pt completed home regimen of Duoneb tx. Unable to assess BS due to patient in pain, back brace on, and in sling. Vital signs remained WNL. RT will continue to follow.     Mayra Diamond, RT

## 2024-08-14 NOTE — PLAN OF CARE
Problem: Adult Inpatient Plan of Care  Goal: Plan of Care Review  Outcome: Progressing   Goal Outcome Evaluation:         Patient alert and oriented x4. Able to communicate her needs. TLSO and sling to RUE is on. Skin is extremely bruised to right shoulder/chest/upper back area. Refuses ice. Lidocaine patches applied. Tylenol scheduled. Refuses prn oxycodone. States her pain is manageable. Transfers with the assist of (2) using transfer belt. PT/OT scheduled. Pure-wick intact draining yellow urine. Regular diet with good intake. Ortho following.   Maria G Townsend RN

## 2024-08-14 NOTE — PROGRESS NOTES
Care Management Follow Up    Length of Stay (days): 6    Expected Discharge Date: 08/15/2024     Concerns to be Addressed: discharge planning     Patient plan of care discussed at interdisciplinary rounds: Yes    Anticipated Discharge Disposition: Transitional Care     Anticipated Discharge Services: Home Care  Anticipated Discharge DME:      Patient/family educated on Medicare website which has current facility and service quality ratings: yes  Education Provided on the Discharge Plan: Yes  Patient/Family in Agreement with the Plan:      Referrals Placed by CM/SW: Post Acute Facilities  Private pay costs discussed: transportation costs    Additional Information:  Pt fell last night. Ortho consulted to evaluate for any potential new fracture. Therapy will also need to reevaluate Pt's mobility for updated recommendations after the fall. Therapy is recommending home with assist and home with home care at this time. Pt was accepted by [facility] for home PT/OT/HHA services.     1:30 PM  Therapy recommendations updated to TCU.     2:15 PM  VANDANA met and spoke with Pt about TCU. Pt reported that she has been to two different TCUs in the past and she does not think positively of TCU facilities. Pt stated she had a terrible experience at [facility] and would never want to return to that facility. Pt reported she went to [facility] which wasn't great but she liked it there because the food was good and they had an electric recliner that they let her sleep in. Pt reported that she would only go to TCU that let her sleep in an electric recliner chair. SW reported that CM was unsure what facilities would have electric recliners that are located in their TCU rooms. SW reported CM could try to call facilities but asked what she would decide if CM is unable to find a nearby facility that would allow her to sleep in an electric recliner . Pt reported that she will have to reconsider her options if no other  option is available. SW left a TCU list in Pt's room with CM contact [phone].     3:21 PM  SW sent a referral to [facility] asking if [facility] would allow for this again. [facility] liaison Kassie reported that she will look into it and get back to CM.      JUAN CARLOS Flynn

## 2024-08-14 NOTE — CONSULTS
ORTHOPEDIC CONSULTATION    Consultation  Sussy Cole,  1935, MRN [id number]    Fall, initial encounter [W19.XXXA]  Closed fracture of eighth thoracic vertebra, unspecified fracture morphology, initial encounter (H) [S27.920A]    PCP: Bobby Quiroga, [phone]   Code status:  Full Code       Extended Emergency Contact Information  Primary Emergency Contact: Rukhsana Cole  Mobile Phone: [phone]  Relation: Daughter  Secondary Emergency Contact: Narinder Cole  Work Phone: [phone]  Relation: Daughter         IMPRESSION:  Right acute closed mildly comminuted minimally displaced distal clavicle fracture  Right acute closed nondisplaced supraspinous scapular fracture     PLAN:  This patient was discussed with Dr. Felton, on-call surgeon for [facility] and they are in agreement with the following plan.   -No indication for urgent surgical intervention at this time.  Plan for nonoperative management of these fractures.  -Sling to the right upper extremity to be worn at all times besides hygiene cares.  -Nonweightbearing right upper extremity  -Pain control per primary team  -Will follow-up with one of our shoulder surgeons in 2 weeks time following discharge    Ortho will sign off. Please feel free to reach out with any further questions or concerns.       Thank you for including [facility] in the care of Sussy Cole. It has been a pleasure participating in their care.        CHIEF COMPLAINT: <principal problem not specified>    HISTORY OF PRESENT ILLNESS:  The patient is seen in orthopedic consultation at the request of Dana Salgado for right shoulder pain.  The patient is a 89 year old female    The patient presents today with right shoulder pain following a fall yesterday here in the hospital.  She is having pain to the anterior and posterior aspect of her shoulder.  She is in a sling at the time of my visit and is fairly comfortable in the sling today.  She is not  "having any numbness/tingling through her right upper extremity besides some baseline ulnar neuropathy that she had prior to her fall.  She notes that she does have a history of a proximal humerus fracture that was treated nonoperatively in the past.  She has baseline deficit in shoulder range of motion ever since this injury.      ALLERGIES:   Review of patient's allergies indicates   Allergies   Allergen Reactions    Hydrocodone-Acetaminophen Nausea and Vomiting    Amoxicillin Other (See Comments)     Sores in mouth, tongue slightly swollen    Hydrochlorothiazide Unknown    Levofloxacin Other (See Comments)     \"mouth gets raw\"    Morphine Nausea and Vomiting         MEDICATIONS UPON ADMISSION:  Medications were reviewed.  They include:   Medications Prior to Admission   Medication Sig Dispense Refill Last Dose    allopurinoL (ZYLOPRIM) 100 MG tablet [ALLOPURINOL (ZYLOPRIM) 100 MG TABLET] Take 100 mg by mouth 2 (two) times a day.    8/8/2024 at am    amLODIPine (NORVASC) 5 MG tablet Take 1 tablet by mouth daily   8/8/2024 at am    aspirin (ASA) 81 MG chewable tablet Take 1 tablet (81 mg) by mouth daily 90 tablet 0 8/8/2024 at am    atorvastatin (LIPITOR) 20 MG tablet TAKE 1 TABLET (20 MG) BY MOUTH DAILY 90 tablet 0 8/7/2024 at pm    calcium-vitamin D3-vitamin K (VIACTIV) 650 mg-12.5 mcg-40 mcg Chew [CALCIUM-VITAMIN D3-VITAMIN K (VIACTIV) 650 MG-12.5 MCG-40 MCG CHEW] Chew 1 tablet 2 (two) times a day.    8/8/2024 at am    clotrimazole-betamethasone (LOTRISONE) 1-0.05 % external cream Apply topically 2 times daily as needed   Unknown at prn    diclofenac sodium (VOLTAREN) 1 % Gel [DICLOFENAC SODIUM (VOLTAREN) 1 % GEL] Apply 2 g topically 4 (four) times a day as needed. 2g to LUE, 4g to LLE  0 Unknown at prn    ferrous sulfate (FEROSUL) 325 (65 Fe) MG tablet Take 1 tablet by mouth daily (with breakfast)   8/8/2024 at am    furosemide (LASIX) 20 MG tablet Take 20 mg by mouth daily   8/8/2024 at am    ipratropium - " albuterol 0.5 mg/2.5 mg/3 mL (DUONEB) 0.5-2.5 (3) MG/3ML neb solution Take 1 vial by nebulization 2 times daily   8/8/2024 at am    levothyroxine (SYNTHROID, LEVOTHROID) 112 MCG tablet [LEVOTHYROXINE (SYNTHROID, LEVOTHROID) 112 MCG TABLET] Take 112 mcg by mouth Daily at 6:00 am.    8/8/2024 at am    multivitamin with minerals (THERA-M) 9 mg iron-400 mcg Tab tablet [MULTIVITAMIN WITH MINERALS (THERA-M) 9 MG IRON-400 MCG TAB TABLET] Take 1 tablet by mouth daily.    8/8/2024 at am    omeprazole (PRILOSEC) 20 MG capsule [OMEPRAZOLE (PRILOSEC) 20 MG CAPSULE] Take 1 capsule (20 mg total) by mouth 2 (two) times a day before meals. 60 capsule 0 8/8/2024 at am         SOCIAL HISTORY:   she  reports that she has quit smoking. She has never used smokeless tobacco. She reports that she does not drink alcohol and does not use drugs.      FAMILY HISTORY:  family history includes Glaucoma in her brother.      REVIEW OF SYSTEMS:   Reviewed with patient. See HPI, otherwise negative       PHYSICAL EXAMINATION:  Vitals: /55 (BP Location: Left leg)   Pulse 97   Temp 98.2  F (36.8  C) (Oral)   Resp 18   Wt 87 kg (191 lb 12.8 oz)   SpO2 90%   BMI 35.07 kg/m    General: On examination, the patient is resting comfortably, NAD, awake, and alert and oriented to person, place, time, and, and general circumstances   SKIN: There is no evidence of erythema, warmth, ecchymosis, swelling, deformity, crepitus, break to the skin, open wound.  Pulses:  Brachial and radial pulse is intact and equal bilaterally  Sensation: Slightly decreased in ulnar nerve distribution but this is baseline.  And all other dermatomes sensation is intact and equal bilaterally  Tenderness: Expected tenderness palpation of the distal clavicle, tenderness over the superior aspect of the scapula  ROM: Patient is able to wiggle fingers without pain, flex/extend wrist without pain, flex/extend elbow without pain.  Deferred shoulder range of motion.  She is able to  make a thumbs up, cross fingers, and make an okay sign  Motor: hand  intact, wrist flexion/extension with resistance intact.  Contralateral side= Full range of motion, Negative joint instability findings, 5/5 motor groups about the joint, Non-tender.       RADIOGRAPHIC EVALUATION:  Personally reviewed  EXAM: XR SHOULDER RIGHT 2 VIEWS  LOCATION: [facility]  DATE: 8/13/2024     INDICATION: Fall, palpable stepoff.  COMPARISON: 8/12/2024. 2/22/2024.                                                                      IMPRESSION: Bones are demineralized. Acute mildly displaced fracture of the right distal clavicle near the AC joint. There is a curvilinear ossific density and bony irregularity along the superior margin of the scapula, and there appears to be offset   along the superior scapular body/spine region. Findings raise concern for a comminuted scapular fracture and CT of the shoulder is recommended for further evaluation.     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 No definitive glenohumeral dislocation there is some inferior positioning of the humeral head with   respect to the glenoid which appears similar to the 2/22/2024 study.      EXAM: CT SHOULDER RIGHT W/O CONTRAST  LOCATION: [facility]  DATE: 8/14/2024     INDICATION: Possible R sided comminuted scapular fracture seen on XR; Shoulder pain; Fracture, known or r o, or trauma; Shoulder x ray result available; No known automatically detected potential contraindications to MRI.  COMPARISON: None.  TECHNIQUE: Noncontrast. Axial, sagittal and coronal thin-section reconstruction. Dose reduction techniques were used.      FINDINGS:      BONES:  -Old healed fracture of the proximal humerus with posttraumatic deformity and superimposed degenerative change at the glenohumeral joint. No evidence for  acute fracture or dislocation. Marked hypertrophic change at the AC joint without diastasis. There   is a tiny effusion. There is a comminuted fracture of the distal clavicle without significant angulation deformity or foreshortening. The fracture extends into the articular margin of the AC joint. Additional nondisplaced fracture of the supraspinous   portion of the scapula. The scapular spine and infraspinous portion are intact.     SOFT TISSUES:  -Minimal edema surrounding the clavicle fracture but no evidence for significant organized hematoma.                                                                      IMPRESSION:  1.  Mildly comminuted but not significantly displaced fracture of the distal clavicle.  2.  Old healed fracture of the proximal humerus with residual posttraumatic deformity.  3.  Superimposed degenerative change at both the glenohumeral and acromioclavicular joints.  4.  Additional nondisplaced fracture of the supraspinous portion of the scapula.  5.  Slight soft tissue swelling surrounding the clavicular fracture but no evidence for organized hematoma.  6.  Exam otherwise negative.    PERTINENT LABS:  Personally reviewed  Recent Labs   Lab Test 08/11/24  0611 08/10/24  0721 12/24/21  0023 12/24/21  0022   INR  --   --   --  0.95   HGB 10.2* 9.9*   < >  --    PLT  --  137*   < >  --     < > = values in this interval not displayed.         NIKKO MUÑOZ PA-C  Date: 8/14/2024  Time: 10:24 AM  [facility]    CC1:   Dana Salgado    CC2:   Bobby Quiroga

## 2024-08-15 ENCOUNTER — APPOINTMENT (OUTPATIENT)
Dept: PHYSICAL THERAPY | Facility: HOSPITAL | Age: 89
DRG: 552 | End: 2024-08-15
Payer: MEDICARE

## 2024-08-15 ENCOUNTER — APPOINTMENT (OUTPATIENT)
Dept: OCCUPATIONAL THERAPY | Facility: HOSPITAL | Age: 89
DRG: 552 | End: 2024-08-15
Payer: MEDICARE

## 2024-08-15 PROBLEM — S42.034D CLOSED NONDISPLACED FRACTURE OF ACROMIAL END OF RIGHT CLAVICLE WITH ROUTINE HEALING: Status: ACTIVE | Noted: 2024-08-15

## 2024-08-15 PROCEDURE — 97110 THERAPEUTIC EXERCISES: CPT | Mod: GP

## 2024-08-15 PROCEDURE — 99232 SBSQ HOSP IP/OBS MODERATE 35: CPT | Performed by: HOSPITALIST

## 2024-08-15 PROCEDURE — 97530 THERAPEUTIC ACTIVITIES: CPT | Mod: GP

## 2024-08-15 PROCEDURE — 94799 UNLISTED PULMONARY SVC/PX: CPT

## 2024-08-15 PROCEDURE — 120N000001 HC R&B MED SURG/OB

## 2024-08-15 PROCEDURE — 94640 AIRWAY INHALATION TREATMENT: CPT

## 2024-08-15 PROCEDURE — 250N000013 HC RX MED GY IP 250 OP 250 PS 637: Performed by: STUDENT IN AN ORGANIZED HEALTH CARE EDUCATION/TRAINING PROGRAM

## 2024-08-15 PROCEDURE — 250N000013 HC RX MED GY IP 250 OP 250 PS 637: Performed by: INTERNAL MEDICINE

## 2024-08-15 PROCEDURE — 97535 SELF CARE MNGMENT TRAINING: CPT | Mod: GO

## 2024-08-15 PROCEDURE — 999N000157 HC STATISTIC RCP TIME EA 10 MIN

## 2024-08-15 PROCEDURE — 250N000009 HC RX 250

## 2024-08-15 PROCEDURE — 97116 GAIT TRAINING THERAPY: CPT | Mod: GP

## 2024-08-15 PROCEDURE — 250N000013 HC RX MED GY IP 250 OP 250 PS 637: Performed by: HOSPITALIST

## 2024-08-15 PROCEDURE — 94640 AIRWAY INHALATION TREATMENT: CPT | Mod: 76

## 2024-08-15 PROCEDURE — 250N000013 HC RX MED GY IP 250 OP 250 PS 637

## 2024-08-15 RX ORDER — MINERAL OIL 100 G/100G
1 OIL RECTAL ONCE
Status: COMPLETED | OUTPATIENT
Start: 2024-08-15 | End: 2024-08-15

## 2024-08-15 RX ORDER — SODIUM PHOSPHATE,MONO-DIBASIC 19G-7G/118
1 ENEMA (ML) RECTAL ONCE
Status: COMPLETED | OUTPATIENT
Start: 2024-08-15 | End: 2024-08-15

## 2024-08-15 RX ADMIN — IPRATROPIUM BROMIDE AND ALBUTEROL SULFATE 3 ML: .5; 3 SOLUTION RESPIRATORY (INHALATION) at 12:52

## 2024-08-15 RX ADMIN — POLYETHYLENE GLYCOL 3350 17 G: 17 POWDER, FOR SOLUTION ORAL at 20:22

## 2024-08-15 RX ADMIN — MAGNESIUM HYDROXIDE 30 ML: 400 SUSPENSION ORAL at 10:26

## 2024-08-15 RX ADMIN — FUROSEMIDE 20 MG: 20 TABLET ORAL at 10:06

## 2024-08-15 RX ADMIN — IPRATROPIUM BROMIDE AND ALBUTEROL SULFATE 3 ML: .5; 3 SOLUTION RESPIRATORY (INHALATION) at 08:21

## 2024-08-15 RX ADMIN — MINERAL OIL 1 ENEMA: 100 ENEMA RECTAL at 12:03

## 2024-08-15 RX ADMIN — ASPIRIN 81 MG CHEWABLE TABLET 81 MG: 81 TABLET CHEWABLE at 10:06

## 2024-08-15 RX ADMIN — Medication 1 TABLET: at 10:06

## 2024-08-15 RX ADMIN — AMLODIPINE BESYLATE 5 MG: 5 TABLET ORAL at 20:24

## 2024-08-15 RX ADMIN — ACETAMINOPHEN 975 MG: 325 TABLET ORAL at 20:22

## 2024-08-15 RX ADMIN — IPRATROPIUM BROMIDE AND ALBUTEROL SULFATE 3 ML: .5; 3 SOLUTION RESPIRATORY (INHALATION) at 21:25

## 2024-08-15 RX ADMIN — IPRATROPIUM BROMIDE AND ALBUTEROL SULFATE 3 ML: .5; 3 SOLUTION RESPIRATORY (INHALATION) at 16:16

## 2024-08-15 RX ADMIN — ACETAMINOPHEN 975 MG: 325 TABLET ORAL at 09:57

## 2024-08-15 RX ADMIN — LEVOTHYROXINE SODIUM 112 MCG: 0.11 TABLET ORAL at 04:07

## 2024-08-15 RX ADMIN — ALLOPURINOL 100 MG: 100 TABLET ORAL at 20:24

## 2024-08-15 RX ADMIN — ALLOPURINOL 100 MG: 100 TABLET ORAL at 10:06

## 2024-08-15 RX ADMIN — ACETAMINOPHEN 975 MG: 325 TABLET ORAL at 14:27

## 2024-08-15 RX ADMIN — PANTOPRAZOLE SODIUM 40 MG: 40 TABLET, DELAYED RELEASE ORAL at 16:29

## 2024-08-15 RX ADMIN — PANTOPRAZOLE SODIUM 40 MG: 40 TABLET, DELAYED RELEASE ORAL at 06:20

## 2024-08-15 RX ADMIN — SENNOSIDES AND DOCUSATE SODIUM 1 TABLET: 8.6; 5 TABLET ORAL at 10:06

## 2024-08-15 RX ADMIN — ATORVASTATIN CALCIUM 20 MG: 10 TABLET, FILM COATED ORAL at 20:24

## 2024-08-15 RX ADMIN — BISACODYL 10 MG: 10 SUPPOSITORY RECTAL at 02:29

## 2024-08-15 RX ADMIN — POLYETHYLENE GLYCOL 3350 17 G: 17 POWDER, FOR SOLUTION ORAL at 10:06

## 2024-08-15 RX ADMIN — Medication 1 TABLET: at 20:24

## 2024-08-15 RX ADMIN — SENNOSIDES AND DOCUSATE SODIUM 1 TABLET: 8.6; 5 TABLET ORAL at 20:24

## 2024-08-15 RX ADMIN — FERROUS SULFATE TAB 325 MG (65 MG ELEMENTAL FE) 325 MG: 325 (65 FE) TAB at 09:57

## 2024-08-15 RX ADMIN — HYDROXYZINE HYDROCHLORIDE 25 MG: 25 TABLET ORAL at 12:01

## 2024-08-15 ASSESSMENT — ACTIVITIES OF DAILY LIVING (ADL)
ADLS_ACUITY_SCORE: 44
ADLS_ACUITY_SCORE: 43
ADLS_ACUITY_SCORE: 44
ADLS_ACUITY_SCORE: 43
ADLS_ACUITY_SCORE: 44
ADLS_ACUITY_SCORE: 39
ADLS_ACUITY_SCORE: 44
ADLS_ACUITY_SCORE: 44
ADLS_ACUITY_SCORE: 43
ADLS_ACUITY_SCORE: 44
ADLS_ACUITY_SCORE: 44
ADLS_ACUITY_SCORE: 43
ADLS_ACUITY_SCORE: 44
ADLS_ACUITY_SCORE: 43

## 2024-08-15 NOTE — PROGRESS NOTES
Care Management Follow Up    Length of Stay (days): 7    Expected Discharge Date: 08/16/2024    Anticipated Discharge Plan:  Transitional Care    Transportation: Confirmed Family/friend    PT Recommendations: Transitional Care Facility, Per plan established by the PT  OT Recommendations:  (S) Transitional Care Facility     Barriers to Discharge: placement    Prior Living Situation: house with alone     Patient/Spokesperson Updated: Yes. Who? Patient and daughter Grace    Additional Information:  CM is working on finding TCU placement for Pt. Pt informed CM yesterday (8/14) that she would only go to a TCU that let her sleep in an electric recliner chair. Pt reported that [facility] allowed her to do this when she stayed there in 2021. SW had sent a referral to the facility asking if they could consider allowing for this again. Referral is pending at this time.    8:57 AM  SW called and followed up with [facility] liaison Kassie regarding Pt's referral for [facility]. SW informed Kassie that Pt is medically ready to discharge. Kassie reported she will look into it further this morning and will call back.     9:08 AM  SW received a phone call from Pt's daughter Grace who called to provide TCU choices to CM. Grace asked for CM to try sending TCU referrals to:  [facility] (informed daughter of the daily private room fee)    Referrals sent.     9:31 AM  SW received a phone call from Kassie who confirmed that [facility] can offer a private TCU room with an electric recliner chair today. SW reported that CM will speak with Pt and call back with a discharge time.    10:55 AM  SW spoke with Pt about nursing home accepting her for placement. Pt appeared to become very frustrated with CM suggesting discharge for today as she said she does not want to even have a conversation about discharging until she has a BM and her staples removed this  afternoon. VANDANA explained that CM was trying to update her that CM was able to identify an accepting facility that met her requests. Pt asked for CM to leave as she was feeling very frustrated about being pushed out so quickly.     VANDANA spoke to Kassie over the phone and explained that Pt is agreeable to the TCU placement but was not willing to discuss a discharge time due to still needing to have a BM and staples removed. Kassie confirmed that they could still accept her tomorrow if needed.    VANDANA called Grace and updated her regarding the discussion with Pt. Grace reported that Pt has been in a lot of pain but Grace was very understanding that CM was trying to keep her informed about discharge. Grace reported that she cannot transport Pt today but can transport Pt at discharge tomorrow if needed.     11:52 AM  After speaking with MD, VANDANA confirmed a plan with Grace and Kassie for Pt to discharge to [facility] (TCU) tomorrow. Grace will plan to transport at noon.     12:33 PM  VANDANA met with Pt and provided her with the updated plan for discharge tomorrow. Pt reported agreement with plans to discharge tomorrow.    CM colleague assisted with completed PAS. [id number].      JUAN CARLOS Flynn

## 2024-08-15 NOTE — PLAN OF CARE
Problem: Orthopaedic Fracture  Goal: Absence of Bleeding  Outcome: Progressing     Problem: Orthopaedic Fracture  Goal: Effective Oxygenation and Ventilation  Outcome: Progressing  Intervention: Promote Airway Secretion Clearance  Recent Flowsheet Documentation  Taken 8/15/2024 0105 by Ivana Ellis RN  Cough And Deep Breathing: done independently per patient  Activity Management:   activity adjusted per tolerance   activity encouraged   up ad scott   up in chair     Problem: Fall Injury Risk  Goal: Absence of Fall and Fall-Related Injury  Outcome: Progressing  Intervention: Identify and Manage Contributors  Recent Flowsheet Documentation  Taken 8/15/2024 0105 by Ivana Ellis RN  Medication Review/Management: medications reviewed  Intervention: Promote Injury-Free Environment  Recent Flowsheet Documentation  Taken 8/15/2024 0105 by Ivana Ellis RN  Safety Promotion/Fall Prevention:   activity supervised   assistive device/personal items within reach   clutter free environment maintained   lighting adjusted   nonskid shoes/slippers when out of bed   room door open   safety round/check completed     Problem: Comorbidity Management  Goal: Blood Pressure in Desired Range  Outcome: Progressing  Intervention: Maintain Blood Pressure Management  Recent Flowsheet Documentation  Taken 8/15/2024 0105 by Ivana Ellis RN  Medication Review/Management: medications reviewed     Goal Outcome Evaluation:  Pt is A&Ox4. Able to make needs known. TLSO brace on. Sling on RUE. Pt is compliant with NWB on RUE. Purewick in place. Pt request to sleep in recliner throughout the night. Around 2 am, pt complained of stomach pain and said it was due to constipation. Pt refused pain meds and requests for Dulcolax suppository to help her have a bowel movement. Pt had a medium bowel movement towards end of shift.

## 2024-08-15 NOTE — PLAN OF CARE
"  Problem: Adult Inpatient Plan of Care  Goal: Plan of Care Review  Description: The Plan of Care Review/Shift note should be completed every shift.  The Outcome Evaluation is a brief statement about your assessment that the patient is improving, declining, or no change.  This information will be displayed automatically on your shift  note.  8/15/2024 1509 by Tiffanie Serrano RN  Outcome: Progressing  8/15/2024 1509 by Tiffanie Serrano RN  Outcome: Progressing  Goal: Patient-Specific Goal (Individualized)  Description: You can add care plan individualizations to a care plan. Examples of Individualization might be:  \"Parent requests to be called daily at 9am for status\", \"I have a hard time hearing out of my right ear\", or \"Do not touch me to wake me up as it startles  me\".  8/15/2024 1509 by Tiffaine Serrano RN  Outcome: Progressing  8/15/2024 1509 by Tiffanie Serrano RN  Outcome: Progressing  Goal: Absence of Hospital-Acquired Illness or Injury  8/15/2024 1509 by Tiffanie Serrano RN  Outcome: Progressing  8/15/2024 1509 by Tiffanie Serrano RN  Outcome: Progressing  Intervention: Identify and Manage Fall Risk  Recent Flowsheet Documentation  Taken 8/15/2024 1031 by Tiffanie Serrano RN  Safety Promotion/Fall Prevention:   activity supervised   assistive device/personal items within reach  Goal: Optimal Comfort and Wellbeing  8/15/2024 1509 by Tiffanie Serrano RN  Outcome: Progressing  8/15/2024 1509 by Tiffanie Serrano RN  Outcome: Progressing  Intervention: Monitor Pain and Promote Comfort  Recent Flowsheet Documentation  Taken 8/15/2024 1017 by Tiffanie Serrano RN  Pain Management Interventions: medication (see MAR)  Goal: Readiness for Transition of Care  8/15/2024 1509 by Tiffanei Serrano RN  Outcome: Progressing  8/15/2024 1509 by Tiffanie Serrano RN  Outcome: Progressing     Problem: Risk for Delirium  Goal: Optimal Coping  8/15/2024 1509 by Tiffanie Serrano RN  Outcome: Progressing  8/15/2024 1509 by Tiffanie Serrano RN  Outcome: Progressing  Goal: Improved " Behavioral Control  8/15/2024 1509 by Tiffanie Serrano RN  Outcome: Progressing  8/15/2024 1509 by Tiffanie Serrano RN  Outcome: Progressing  Intervention: Minimize Safety Risk  Recent Flowsheet Documentation  Taken 8/15/2024 1031 by Tiffanie Serrano RN  Communication Enhancement Strategies:   call light answered in person   extra time allowed for response  Goal: Improved Attention and Thought Clarity  8/15/2024 1509 by Tiffanie Serrano RN  Outcome: Progressing  8/15/2024 1509 by Tiffanie Serrano RN  Outcome: Progressing  Intervention: Maximize Cognitive Function  Recent Flowsheet Documentation  Taken 8/15/2024 1031 by Tiffanie Serrano RN  Sensory Stimulation Regulation: care clustered  Reorientation Measures: clock in view  Goal: Improved Sleep  8/15/2024 1509 by Tiffanie Serrano RN  Outcome: Progressing  8/15/2024 1509 by Tiffanie Serrano RN  Outcome: Progressing     Problem: Comorbidity Management  Goal: Maintenance of COPD Symptom Control  8/15/2024 1509 by Tiffanie Serrano RN  Outcome: Progressing  8/15/2024 1509 by Tiffanie Serrano RN  Outcome: Progressing  Goal: Blood Pressure in Desired Range  8/15/2024 1509 by Tiffanie Serrano RN  Outcome: Progressing  8/15/2024 1509 by Tiffanie Serrano RN  Outcome: Progressing     Problem: Chronic Kidney Disease  Goal: Optimal Coping with Chronic Illness  8/15/2024 1509 by Tiffanie Serrano RN  Outcome: Progressing  8/15/2024 1509 by Tiffanie Serrano RN  Outcome: Progressing  Goal: Electrolyte Balance  8/15/2024 1509 by Tiffanie Serrano RN  Outcome: Progressing  8/15/2024 1509 by Tiffanie Serrano RN  Outcome: Progressing  Goal: Fluid Balance  8/15/2024 1509 by Tiffanie Serrano RN  Outcome: Progressing  8/15/2024 1509 by Tiffanie Serrano RN  Outcome: Progressing  Goal: Optimal Functional Ability  8/15/2024 1509 by Tiffanie Serrano RN  Outcome: Progressing  8/15/2024 1509 by Tiffanie Serrano RN  Outcome: Progressing  Goal: Absence of Anemia Signs and Symptoms  8/15/2024 1509 by Tiffanie Serrano RN  Outcome: Progressing  8/15/2024 1509 by Tiffanie Serrano  RN  Outcome: Progressing  Goal: Optimal Oral Intake  8/15/2024 1509 by Tiffanie Serrano RN  Outcome: Progressing  8/15/2024 1509 by Tiffanie Serrano RN  Outcome: Progressing  Goal: Acceptable Pain Control  8/15/2024 1509 by Tiffanie Serrano RN  Outcome: Progressing  8/15/2024 1509 by Tiffanie Serrano RN  Outcome: Progressing  Intervention: Prevent or Manage Pain  Recent Flowsheet Documentation  Taken 8/15/2024 1017 by Tiffanie Serrano RN  Pain Management Interventions: medication (see MAR)  Goal: Minimize Renal Failure Effects  8/15/2024 1509 by Tiffanie Serrano RN  Outcome: Progressing  8/15/2024 1509 by Tiffanie Serrano RN  Outcome: Progressing  Intervention: Protect and Monitor Dialysis Access Site  Recent Flowsheet Documentation  Taken 8/15/2024 1031 by Tiffanie Serrano RN  Safety Precautions: spinal precautions maintained     Problem: Anemia  Goal: Anemia Symptom Improvement  8/15/2024 1509 by Tiffanie Serrano RN  Outcome: Progressing  8/15/2024 1509 by Tiffanie Serrano RN  Outcome: Progressing  Intervention: Monitor and Manage Anemia  Recent Flowsheet Documentation  Taken 8/15/2024 1031 by Tiffanie Serrano RN  Safety Promotion/Fall Prevention:   activity supervised   assistive device/personal items within reach     Problem: Constipation  Goal: Effective Bowel Elimination  8/15/2024 1509 by Tiffanie Serrano RN  Outcome: Not Progressing  8/15/2024 1509 by Tiffanie Serrano RN  Outcome: Progressing  Intervention: Promote Effective Bowel Elimination  Recent Flowsheet Documentation  Taken 8/15/2024 1031 by Tiffanie Serrano RN  Bowel Elimination Management:   enema given   suppository given     Problem: Orthopaedic Fracture  Goal: Absence of Bleeding  8/15/2024 1509 by Tiffanie Serrano RN  Outcome: Progressing  8/15/2024 1509 by Tiffanie Serrano RN  Outcome: Progressing  Goal: Bowel Elimination  8/15/2024 1509 by Tiffanie Serrano RN  Outcome: Progressing  8/15/2024 1509 by Tiffanie Serrano RN  Outcome: Progressing  Intervention: Promote Effective Bowel Elimination  Recent Flowsheet  Documentation  Taken 8/15/2024 1031 by Tiffanie Serrano RN  Bowel Elimination Management:   enema given   suppository given  Bowel Elimination Promotion:   adequate fluid intake promoted   ambulation promoted  Goal: Absence of Embolism Signs and Symptoms  8/15/2024 1509 by Tiffanie Serrano RN  Outcome: Progressing  8/15/2024 1509 by Tiffanie Serrano RN  Outcome: Progressing  Goal: Fracture Stability  8/15/2024 1509 by Tiffanie Serrano RN  Outcome: Progressing  8/15/2024 1509 by Tiffanie Serrano RN  Outcome: Progressing  Goal: Optimal Functional Ability  8/15/2024 1509 by Tiffanie Serrano RN  Outcome: Progressing  8/15/2024 1509 by Tiffanie Serrano RN  Outcome: Progressing  Goal: Absence of Infection Signs and Symptoms  8/15/2024 1509 by Tiffanie Serrano RN  Outcome: Progressing  8/15/2024 1509 by Tiffanie Serrano RN  Outcome: Progressing  Goal: Effective Tissue Perfusion  8/15/2024 1509 by Tiffanie Serrano RN  Outcome: Progressing  8/15/2024 1509 by Tiffanie Serrano RN  Outcome: Progressing  Goal: Optimal Pain Control and Function  8/15/2024 1509 by Tiffanie Serrano RN  Outcome: Progressing  8/15/2024 1509 by Tiffanie Serrano RN  Outcome: Progressing  Intervention: Manage Acute Orthopaedic-Related Pain  Recent Flowsheet Documentation  Taken 8/15/2024 1017 by Tiffanie Serrano RN  Pain Management Interventions: medication (see MAR)  Goal: Effective Oxygenation and Ventilation  8/15/2024 1509 by Tiffanie Serrano RN  Outcome: Progressing  8/15/2024 1509 by Tiffanie Serrano RN  Outcome: Progressing  Intervention: Promote Airway Secretion Clearance  Recent Flowsheet Documentation  Taken 8/15/2024 1031 by Tiffanie Serrnao RN  Cough And Deep Breathing: done with encouragement     Problem: Fall Injury Risk  Goal: Absence of Fall and Fall-Related Injury  8/15/2024 1509 by Tiffanie Serrano RN  Outcome: Progressing  8/15/2024 1509 by Tiffanie Serrano RN  Outcome: Progressing  Intervention: Promote Injury-Free Environment  Recent Flowsheet Documentation  Taken 8/15/2024 1031 by Tiffanie Serrano RN  Safety  Promotion/Fall Prevention:   activity supervised   assistive device/personal items within reach   Goal Outcome Evaluation:    Pt alert, oriented, anxious. Pt focused on being constipated this morning. Pt had received supp on NOC, hard stool results per report. Given scheduled senna, miralax and prn MOM. Pt insisting on enema, given. No results. BS active. Passing gas. Reports upper abdominal pain/pressure d/t constipation. Received scheduled tylenol. Refused lidocaine patches. Pt stated she was very anxious d/t the constipation and SW talking to her about discharge to TCU. Given prn atarx. Did improve anxiety. Pt states she is not ready to discharge and will not discharge until her constipation resolves.   Right sling in place. TLSO on when up. CMS intact. Staples to back of head in place. Due to be removed today by nursing. Pt did not want them removed earlier as she wanted to take care f her constipation first and felt over whelmed. Will pass on to evening RN to readdress.

## 2024-08-15 NOTE — PROGRESS NOTES
[facility]    Medicine Progress Note - Hospitalist Service    Date of Admission:  8/8/2024    Assessment & Plan                Sussy Cole is a 89 year old female with history of TIA, intracranial atherosclerosis, bilateral carotid artery stenosis, anemia, hypertension, chronic obstructive pulmonary disease, chronic kidney disease stage 3, and breast cancer s/p lumpectomy who presented for evaluation after a fall found to have subacute fracture T8 vertebral body.  Seen by neurosurgery and is recommending nonoperative management. While here had another fall and broke her clavicle. TCU planned for tomorrow. Hospital Day: 8       Closed fracture of the T8 vertebral body 2/2 fall   MRI subacute fracture T8 vertebral body without involvement of the posterior wall.     - Neurosurgery recommending conservative management with brace x12 weeks and outpt follow up with XR in 3 weeks  -Patient has her own brace and per Nsg this is an appropriate brace for current fracture.   -brace on whenever HOB >30 degrees and whenever OOB. No lifting >10 lbs. No repetitive twisting or bending.  -8/13 PT recommending possible home with home care PT    left parietal scalp contusion laceration  -sustained in fall PTA  -remove staples today    Acute fall 8/13 in hospital  New minimally displaced distal clavicle fracture  Nondisplaced scapular fracture  -was ambulating in hallway, fell over against the wall striking her head and shoulder  -new minimally displaced clavicle fracture, nondisplaced scapular fracture, no other new injury  -Evaluated by orthopedic surgery 8/14 with plans to follow-up in 2 weeks with a shoulder specialist-nonoperative management  -NWB RUE, wear sling at all times except for hygiene     Right shoulder and left rib pain likely related to fall  8/12 Shoulder x-ray and chest x-ray without acute fractures.     Acute kidney injury  # CKD- 3  - SHIRA Resolved  -BMP in AM     Constipation  Last BM  8/13/2024 small amount  - on Senakot BID PO + Miralx BID PO.   -minimal result from suppository  -mineral oil enema today ([employer] apparently unavailable today)  -ambulate frequently  -check TSH  -hold home ferrous sulfate     # COPD  -DuoNebs per RT 4 times daily     # Chronic macrocytic anemia  -hold home ferrous sulfate given severe constipation and Hgb stable     # Hypertension  Monitor blood pressure  Hydralazine as needed  home amlodipine, Lasix     # Hypothyroid  home levothyroxine     # Hyperlipidemia  home atorvastatin     #Hypothermia resolved           Diet: Regular Diet Adult    DVT Prophylaxis: Moderate risk.   Pneumatic Compression Devices  Jenkins Catheter: Not present  Lines: None     Cardiac Monitoring: None  Code Status: Full Code      Clinically Significant Risk Factors                  # Hypertension: Noted on problem list               # Financial/Environmental Concerns: none               Disposition Plan     Medically Ready for Discharge: Anticipated Tomorrow         Discharge barrier(s): constipation  Care discussed with: patient, dtr, RN, care mgr      Bertha Birch MD  Hospitalist Service  [facility]  Securely message with [employer] (more info)  Text page via [employer] Paging/Directory   ______________________________________________________________________      Physical Exam   Vital Signs: Temp: 97.6  F (36.4  C) Temp src: Oral BP: 124/59 Pulse: 108   Resp: 18 SpO2: 93 % O2 Device: None (Room air)    Weight: 191 lbs 12.8 oz    General: in no apparent distress, non-toxic, and alert female sitting in bedside chair oriented x3. Perseverating on topic of her constipation  HEENT: Head normocephalic atraumatic, oral mucosa moist. Sclerae anicteric  Exam interrupted  Skin: No rashes or lesions  Extremities: No peripheral edema  Psych: Normal affect, irritable mood. Initially would not tell me how she is doing, wants her dtr on speakerphone to tell me. Then started  perseverating on her constipation.  Neuro: Grossly normal      Medical Decision Making               Data   No results found for this or any previous visit (from the past 16 hour(s)).    Interval History     Patient complains of constipation and  feels like bound up with cement.  Enema planned today. Dtr updated on speakerphone. Plan is TCU when bed found.

## 2024-08-15 NOTE — PROGRESS NOTES
VANDANA assisting with pre admission screening for discharge to transitional care. [id number] completed.       JUAN CARLOS Inman at 12:01 PM on 08/15/24

## 2024-08-15 NOTE — PROGRESS NOTES
Physical Therapy        08/14/24 1600   Appointment Info   Signing Clinician's Name / Credentials (PT) Tory Jones DPT   Living Environment   People in Home alone   Current Living Arrangements house   Home Accessibility stairs to enter home;stairs within home   Number of Stairs, Main Entrance 4   Stair Railings, Main Entrance railings safe and in good condition   Self-Care   Equipment Currently Used at Home cane, straight;walker, rolling;grab bar, tub/shower   Fall history within last six months yes   Activity/Exercise/Self-Care Comment pt fell here at hospital, fracturing right clavicle and scapula. Typically uses cane in right hand, or uses walker.   General Information   Onset of Illness/Injury or Date of Surgery 08/08/24   Referring Physician Mirta Hernández   Patient/Family Therapy Goals Statement (PT) get stronger   Pertinent History of Current Problem (include personal factors and/or comorbidities that impact the POC) 89 year old female with a pertinent history of TIA, intracranial atherosclerosis, bilateral carotid artery stenosis, anemia, hypertension, chronic obstructive pulmonary disease, chronic kidney disease stage 3, and breast cancer s/p lumpectomy who presents evaluation after a fall. MRI with subacute fracture T8 vertebral body without involvement of the posterior wall. New displaced distal clavicle fracture and possible scapular fracture   Existing Precautions/Restrictions brace worn when out of bed;fall;weight bearing  (don brace in bed)   Weight-Bearing Status - RUE nonweight-bearing   Weight-Bearing Status - LLE weight-bearing as tolerated   Weight-Bearing Status - RLE weight-bearing as tolerated   Cognition   Cognitive Status Comments grossly oriented and conversationally appropriate   Pain Assessment   Patient Currently in Pain Yes, see Vital Sign flowsheet   Posture    Posture Forward head position   Range of Motion (ROM)   ROM Comment RUE immobilized   Strength (Manual Muscle Testing)    Strength (Manual Muscle Testing) Deficits observed during functional mobility   Bed Mobility   Comment, (Bed Mobility) pt in chair before and after PT   Transfers   Comment, (Transfers) pt unable to stand fully from chair using LUE, even with mod-maxAx1   Sensory Examination   Sensory Perception patient reports no sensory changes   Clinical Impression   Criteria for Skilled Therapeutic Intervention Yes, treatment indicated   PT Diagnosis (PT) impaired functional mobility   Influenced by the following impairments weakness and orthopedic restrictions   Functional limitations due to impairments unable to navigate home environment   Clinical Presentation (PT Evaluation Complexity) stable   Clinical Presentation Rationale presents as medically diagnosed   Clinical Decision Making (Complexity) moderate complexity   Planned Therapy Interventions (PT) balance training;bed mobility training;gait training;home exercise program;neuromuscular re-education;patient/family education;strengthening;transfer training;stair training;orthotic fitting/training;progressive activity/exercise   Risk & Benefits of therapy have been explained evaluation/treatment results reviewed;patient   PT Total Evaluation Time   PT Eval, Re-eval Minutes (89300) 10   Physical Therapy Goals   PT Frequency 5x/week   PT Predicted Duration/Target Date for Goal Attainment 08/21/24   PT: Bed Mobility Minimal assist;Supine to/from sit;Within precautions   PT: Transfers Minimal assist;Sit to/from stand;Within precautions   PT: Gait Minimal assist;25 feet;Within precautions;Davis walker   Therapeutic Activity   Therapeutic Activities: dynamic activities to improve functional performance Minutes (34487) 10   Symptoms Noted During/After Treatment Fatigue   Treatment Detail/Skilled Intervention pt completed 2 sets of 5 isometric chair pushups with LUE, unable to clear chair. 2 attempts to stand using bed rail and modAx1, limited by weakness and pain.   PT Discharge  Planning   PT Plan log roll, TLSO >30 deg, RUE sling; transfers, standing gemini with bed rail, progress to beni-walker as able   PT Discharge Recommendation (DC Rec) (S)  Transitional Care Facility   PT Rationale for DC Rec change in status s/p RUE fractures; pt at higher risk of falls and unable to care for self or perform basic mobility tasks unassisted   PT Brief overview of current status unable to perform basic transfers or ambulation at this time.   Total Session Time   Timed Code Treatment Minutes 10   Total Session Time (sum of timed and untimed services) 20         Tory Jones, DPT 8/14/2024

## 2024-08-15 NOTE — PLAN OF CARE
Problem: Constipation  Goal: Effective Bowel Elimination  Outcome: Progressing  Intervention: Promote Effective Bowel Elimination  Recent Flowsheet Documentation  Taken 8/14/2024 1611 by Shawna Frias, RN  Bowel Function Promotion:   prompt response to urge promoted   straining discouraged  Bowel Elimination Management:   relaxation techniques promoted   sitting position facilitated     Problem: Orthopaedic Fracture  Goal: Optimal Functional Ability  Intervention: Optimize Functional Ability  Recent Flowsheet Documentation  Taken 8/14/2024 1932 by Shawna Frias, RN  Positioning/Transfer Devices:   pillows   in use    Problem: Orthopaedic Fracture  Goal: Optimal Pain Control and Function  Outcome: Progressing    Goal Outcome Evaluation:  Patient spent a fair shift. Alert and oriented x 4. Patient reports little or no pain without activities, but hurts with activities with pain rate reaching up to 8/10. Patient received scheduled tylenol  at bedtime with good effect. Patient has been sitting in the recliner all evening, and refused to lay in bed stating that she feels more discomfort when in bed. Patient was up on the commode. Voided, but no bowel movement. Patient received scheduled bowel medications at bedtime. PureWick in use.

## 2024-08-15 NOTE — PROGRESS NOTES
CLINICAL NUTRITION SERVICES    Reviewed nutrition risk factors due to LOS. Pt is tolerating diet, eating well per nursing documentation and ordering balanced meals. No nutrition issues identified at this time. RD will follow peripherally at this time, unless consulted.

## 2024-08-16 VITALS
BODY MASS INDEX: 35.07 KG/M2 | SYSTOLIC BLOOD PRESSURE: 118 MMHG | DIASTOLIC BLOOD PRESSURE: 61 MMHG | OXYGEN SATURATION: 90 % | WEIGHT: 191.8 LBS | HEART RATE: 106 BPM | TEMPERATURE: 97.6 F | RESPIRATION RATE: 18 BRPM

## 2024-08-16 LAB
ANION GAP SERPL CALCULATED.3IONS-SCNC: 14 MMOL/L (ref 7–15)
BUN SERPL-MCNC: 23.2 MG/DL (ref 8–23)
CALCIUM SERPL-MCNC: 9.2 MG/DL (ref 8.8–10.4)
CHLORIDE SERPL-SCNC: 95 MMOL/L (ref 98–107)
CREAT SERPL-MCNC: 0.98 MG/DL (ref 0.51–0.95)
EGFRCR SERPLBLD CKD-EPI 2021: 56 ML/MIN/1.73M2
GLUCOSE SERPL-MCNC: 112 MG/DL (ref 70–99)
HCO3 SERPL-SCNC: 27 MMOL/L (ref 22–29)
POTASSIUM SERPL-SCNC: 4.4 MMOL/L (ref 3.4–5.3)
SODIUM SERPL-SCNC: 136 MMOL/L (ref 135–145)
TSH SERPL DL<=0.005 MIU/L-ACNC: 4.44 UIU/ML (ref 0.3–4.2)

## 2024-08-16 PROCEDURE — 99239 HOSP IP/OBS DSCHRG MGMT >30: CPT | Performed by: HOSPITALIST

## 2024-08-16 PROCEDURE — 250N000013 HC RX MED GY IP 250 OP 250 PS 637: Performed by: INTERNAL MEDICINE

## 2024-08-16 PROCEDURE — 80048 BASIC METABOLIC PNL TOTAL CA: CPT | Performed by: HOSPITALIST

## 2024-08-16 PROCEDURE — 94799 UNLISTED PULMONARY SVC/PX: CPT

## 2024-08-16 PROCEDURE — 250N000009 HC RX 250

## 2024-08-16 PROCEDURE — 999N000157 HC STATISTIC RCP TIME EA 10 MIN

## 2024-08-16 PROCEDURE — 250N000013 HC RX MED GY IP 250 OP 250 PS 637: Performed by: STUDENT IN AN ORGANIZED HEALTH CARE EDUCATION/TRAINING PROGRAM

## 2024-08-16 PROCEDURE — 36415 COLL VENOUS BLD VENIPUNCTURE: CPT | Performed by: HOSPITALIST

## 2024-08-16 PROCEDURE — 94640 AIRWAY INHALATION TREATMENT: CPT

## 2024-08-16 PROCEDURE — 250N000013 HC RX MED GY IP 250 OP 250 PS 637

## 2024-08-16 PROCEDURE — 84443 ASSAY THYROID STIM HORMONE: CPT | Performed by: HOSPITALIST

## 2024-08-16 PROCEDURE — 250N000013 HC RX MED GY IP 250 OP 250 PS 637: Performed by: HOSPITALIST

## 2024-08-16 RX ORDER — OXYCODONE HYDROCHLORIDE 5 MG/1
2.5 TABLET ORAL EVERY 6 HOURS PRN
Qty: 6 TABLET | Refills: 0 | Status: SHIPPED | OUTPATIENT
Start: 2024-08-16 | End: 2024-08-22

## 2024-08-16 RX ORDER — FERROUS SULFATE 325(65) MG
325 TABLET ORAL
DISCHARGE
Start: 2024-08-23 | End: 2024-08-23 | Stop reason: DRUGHIGH

## 2024-08-16 RX ORDER — LIDOCAINE 4 G/G
2 PATCH TOPICAL EVERY 24 HOURS
Qty: 20 PATCH | Refills: 0 | Status: SHIPPED | OUTPATIENT
Start: 2024-08-16 | End: 2024-09-04 | Stop reason: ALTCHOICE

## 2024-08-16 RX ORDER — BISACODYL 10 MG
10 SUPPOSITORY, RECTAL RECTAL DAILY PRN
DISCHARGE
Start: 2024-08-16

## 2024-08-16 RX ORDER — AMOXICILLIN 250 MG
1 CAPSULE ORAL 2 TIMES DAILY
DISCHARGE
Start: 2024-08-16

## 2024-08-16 RX ORDER — POLYETHYLENE GLYCOL 3350 17 G/17G
17 POWDER, FOR SOLUTION ORAL 2 TIMES DAILY
DISCHARGE
Start: 2024-08-16

## 2024-08-16 RX ORDER — ACETAMINOPHEN 325 MG/1
975 TABLET ORAL 3 TIMES DAILY
DISCHARGE
Start: 2024-08-16 | End: 2024-08-22

## 2024-08-16 RX ORDER — GABAPENTIN 100 MG/1
100 CAPSULE ORAL ONCE
Status: COMPLETED | OUTPATIENT
Start: 2024-08-16 | End: 2024-08-16

## 2024-08-16 RX ORDER — GABAPENTIN 100 MG/1
100 CAPSULE ORAL
Status: DISCONTINUED | OUTPATIENT
Start: 2024-08-16 | End: 2024-08-16 | Stop reason: HOSPADM

## 2024-08-16 RX ADMIN — ALLOPURINOL 100 MG: 100 TABLET ORAL at 10:18

## 2024-08-16 RX ADMIN — Medication 1 TABLET: at 10:18

## 2024-08-16 RX ADMIN — FUROSEMIDE 20 MG: 20 TABLET ORAL at 10:18

## 2024-08-16 RX ADMIN — IPRATROPIUM BROMIDE AND ALBUTEROL SULFATE 3 ML: .5; 3 SOLUTION RESPIRATORY (INHALATION) at 07:42

## 2024-08-16 RX ADMIN — GABAPENTIN 100 MG: 100 CAPSULE ORAL at 10:18

## 2024-08-16 RX ADMIN — PANTOPRAZOLE SODIUM 40 MG: 40 TABLET, DELAYED RELEASE ORAL at 07:08

## 2024-08-16 RX ADMIN — ACETAMINOPHEN 975 MG: 325 TABLET ORAL at 10:17

## 2024-08-16 RX ADMIN — DOCUSATE SODIUM 226 ML: 50 LIQUID ORAL at 08:30

## 2024-08-16 RX ADMIN — SENNOSIDES AND DOCUSATE SODIUM 1 TABLET: 8.6; 5 TABLET ORAL at 10:18

## 2024-08-16 RX ADMIN — ASPIRIN 81 MG CHEWABLE TABLET 81 MG: 81 TABLET CHEWABLE at 10:18

## 2024-08-16 RX ADMIN — LEVOTHYROXINE SODIUM 112 MCG: 0.11 TABLET ORAL at 05:06

## 2024-08-16 ASSESSMENT — ACTIVITIES OF DAILY LIVING (ADL)
ADLS_ACUITY_SCORE: 39

## 2024-08-16 NOTE — PROGRESS NOTES
Patient was alert and oriented this shift. She still had complaints of constipation this morning. A new docusate/mineral oil/Naphos enema was ordered and given. She had large results later in the morning. She did have complaints of back and arm pain, but declined oxycodone due to constipation. She did take her scheduled tylenol.  She was discharge to TCU today. Given wheelchair transport to TCU.  Oxana Arvizu RN

## 2024-08-16 NOTE — DISCHARGE SUMMARY
[facility] MEDICINE  DISCHARGE SUMMARY     Primary Care Physician: Bobby Quiroga  Admission Date: 8/8/2024   Discharge Provider: Bertha Birch MD Discharge Date: 8/16/2024   Diet:   Active Diet and Nourishment Order   Procedures    Regular Diet Adult    Diet       Code Status: Full Code   Activity: DCACTIVITY: Activity as tolerated        Condition at Discharge: Good     REASON FOR PRESENTATION(See Admission Note for Details)   Fall, back pain    PRINCIPAL & ACTIVE DISCHARGE DIAGNOSES     Active Problems:    Hypertension    Hypothyroidism    Renal failure (ARF), acute on chronic  (H24)    COPD with chronic bronchitis (H)    Fall, initial encounter    Closed fracture of eighth thoracic vertebra, unspecified fracture morphology, initial encounter (H)    DISH (diffuse idiopathic skeletal hyperostosis)    Closed nondisplaced fracture of acromial end of right clavicle with routine healing      PENDING LABS     Unresulted Labs Ordered in the Past 30 Days of this Admission       No orders found from 7/9/2024 to 8/9/2024.            PROCEDURES ( this hospitalization only)      none    RECOMMENDATIONS TO OUTPATIENT PROVIDER FOR F/U VISIT     Follow-up Appointments     Follow Up and recommended labs and tests      Follow up with group home physician.  The following labs/tests are   recommended: BMP and Hgb in 1 week. TSH in 3 months.  Follow up with Neurosurgery in 3 weeks for X Rays. Call [facility] at [phone] to schedule.  Follow up with [facility] shoulder specialist in 2 weeks. Call   [facility] at [phone] to schedule.            DISPOSITION     Skilled Nursing Facility    SUMMARY OF HOSPITAL COURSE:      Sussy Cole is a 89 year old female with history of TIA, intracranial atherosclerosis, bilateral carotid artery stenosis, anemia, hypertension, chronic obstructive pulmonary disease, chronic kidney disease stage 3, and breast cancer  s/p lumpectomy who presented for evaluation after a fall found to have subacute fracture T8 vertebral body.  Seen by neurosurgery and recommended nonoperative management. While here had another fall and broke her clavicle. TCU planned for today. Hospital Day: 9        Closed fracture of the T8 vertebral body 2/2 fall   MRI subacute fracture T8 vertebral body without involvement of the posterior wall.     - Neurosurgery recommending conservative management with brace x12 weeks and outpt follow up with XR in 3 weeks  -Patient has her own brace and per Nsg this is an appropriate brace for current fracture.   -brace on whenever HOB >30 degrees and whenever OOB. No lifting >10 lbs. No repetitive twisting or bending.  -PT recommending TCU     left parietal scalp contusion laceration  -sustained in fall PTA  -removed staples 8/15     Acute fall 8/13 in hospital  New minimally displaced distal clavicle fracture  Nondisplaced scapular fracture  -was ambulating in hallway, fell over against the wall striking her head and shoulder  -new minimally displaced clavicle fracture, nondisplaced scapular fracture, no other new injury  -Evaluated by orthopedic surgery 8/14 with plans to follow-up in 2 weeks with a shoulder specialist- nonoperative management  -NWB RUE, wear sling at all times except for hygiene  -tylenol, lidocaine patch, low dose oxycodone PRN     Right shoulder and left rib pain likely related to fall  8/12 Shoulder x-ray and chest x-ray without acute fractures.    Constipation  Reported no BM <1 week, here despite aggressive bowel regimen was only passing hard small stools. Minimal result from mineral oil enema 8/15. Finally had large BM today after [place] enema.  - Senakot BID PO + Miralx BID PO. Instructions to hold or modify this regimen if stools are excessive now that constipation relieved  -bisacodyl suppository PRN  -ambulate frequently  -TSH ok  -hold home ferrous sulfate     # COPD  -DuoNebs 2x daily per  home regimen     # Chronic macrocytic anemia  -hold home ferrous sulfate given severe constipation and Hgb stable. Could resume in 2 weeks     # Hypertension  home amlodipine, Lasix     # Hypothyroid  home levothyroxine     # Hyperlipidemia  home atorvastatin       Discharge Medications with Med changes:     Current Discharge Medication List        START taking these medications    Details   acetaminophen (TYLENOL) 325 MG tablet Take 3 tablets (975 mg) by mouth 3 times daily    Associated Diagnoses: Closed fracture of eighth thoracic vertebra, unspecified fracture morphology, initial encounter (H); Closed nondisplaced fracture of acromial end of right clavicle with routine healing      bisacodyl (DULCOLAX) 10 MG suppository Place 1 suppository (10 mg) rectally daily as needed for constipation    Associated Diagnoses: Slow transit constipation      Lidocaine (LIDOCARE) 4 % Patch Place 2 patches onto the skin every 24 hours To prevent lidocaine toxicity, patient should be patch free for 12 hrs daily.  Qty: 20 patch, Refills: 0    Associated Diagnoses: Closed fracture of eighth thoracic vertebra, unspecified fracture morphology, initial encounter (H); Closed nondisplaced fracture of acromial end of right clavicle with routine healing      oxyCODONE (ROXICODONE) 5 MG tablet Take 0.5 tablets (2.5 mg) by mouth every 6 hours as needed for severe pain  Qty: 6 tablet, Refills: 0    Associated Diagnoses: Closed fracture of eighth thoracic vertebra, unspecified fracture morphology, initial encounter (H); Closed nondisplaced fracture of acromial end of right clavicle with routine healing      polyethylene glycol (MIRALAX) 17 GM/Dose powder Take 17 g by mouth 2 times daily If stool hard or difficult to pass, continue BID. If loose stool 1-2x daily, continue current dose. If loose stool >2x daily, decrease dose to once daily. If continued loose stool >2x daily, change to BID PRN constipation.    Associated Diagnoses: Slow transit  constipation      senna-docusate (SENOKOT-S/PERICOLACE) 8.6-50 MG tablet Take 1 tablet by mouth 2 times daily If stool hard or difficult to pass, continue BID. If loose stool 1-2x daily, continue current dose. If loose stool >2x daily, decrease dose to once daily. If continued loose stool >2x daily, change to BID PRN constipation.    Associated Diagnoses: Slow transit constipation           CONTINUE these medications which have CHANGED    Details   ferrous sulfate (FEROSUL) 325 (65 Fe) MG tablet Take 1 tablet (325 mg) by mouth daily (with breakfast) Resume once constipation resolved    Associated Diagnoses: Macrocytic anemia           CONTINUE these medications which have NOT CHANGED    Details   allopurinoL (ZYLOPRIM) 100 MG tablet [ALLOPURINOL (ZYLOPRIM) 100 MG TABLET] Take 100 mg by mouth 2 (two) times a day.       amLODIPine (NORVASC) 5 MG tablet Take 1 tablet by mouth daily      aspirin (ASA) 81 MG chewable tablet Take 1 tablet (81 mg) by mouth daily  Qty: 90 tablet, Refills: 0    Associated Diagnoses: TIA (transient ischemic attack); Vertebrobasilar insufficiency; Intracranial atherosclerosis      atorvastatin (LIPITOR) 20 MG tablet TAKE 1 TABLET (20 MG) BY MOUTH DAILY  Qty: 90 tablet, Refills: 0    Comments: Future refills should be requested of PCP Rosalee Bravo with [facility]  Associated Diagnoses: TIA (transient ischemic attack); Vertebrobasilar insufficiency; Intracranial atherosclerosis      calcium-vitamin D3-vitamin K (VIACTIV) 650 mg-12.5 mcg-40 mcg Chew [CALCIUM-VITAMIN D3-VITAMIN K (VIACTIV) 650 MG-12.5 MCG-40 MCG CHEW] Chew 1 tablet 2 (two) times a day.       clotrimazole-betamethasone (LOTRISONE) 1-0.05 % external cream Apply topically 2 times daily as needed      diclofenac sodium (VOLTAREN) 1 % Gel [DICLOFENAC SODIUM (VOLTAREN) 1 % GEL] Apply 2 g topically 4 (four) times a day as needed. 2g to LUE, 4g to LLE  Refills: 0    Associated Diagnoses: Pain      furosemide (LASIX) 20 MG tablet Take 20 mg  by mouth daily      ipratropium - albuterol 0.5 mg/2.5 mg/3 mL (DUONEB) 0.5-2.5 (3) MG/3ML neb solution Take 1 vial by nebulization 2 times daily      levothyroxine (SYNTHROID, LEVOTHROID) 112 MCG tablet [LEVOTHYROXINE (SYNTHROID, LEVOTHROID) 112 MCG TABLET] Take 112 mcg by mouth Daily at 6:00 am.       multivitamin with minerals (THERA-M) 9 mg iron-400 mcg Tab tablet [MULTIVITAMIN WITH MINERALS (THERA-M) 9 MG IRON-400 MCG TAB TABLET] Take 1 tablet by mouth daily.       omeprazole (PRILOSEC) 20 MG capsule [OMEPRAZOLE (PRILOSEC) 20 MG CAPSULE] Take 1 capsule (20 mg total) by mouth 2 (two) times a day before meals.  Qty: 60 capsule, Refills: 0    Associated Diagnoses: Gastrointestinal hemorrhage with melena               Consults     NEUROSURGERY IP CONSULT  CARE MANAGEMENT / SOCIAL WORK IP CONSULT  PHYSICAL THERAPY ADULT IP CONSULT  OCCUPATIONAL THERAPY ADULT IP CONSULT  ORTHOPEDIC SURGERY IP CONSULT  [facility] IP CONSULT  PHYSICAL THERAPY ADULT IP CONSULT  OCCUPATIONAL THERAPY ADULT IP CONSULT      SIGNIFICANT IMAGING FINDINGS     Results for orders placed or performed during the hospital encounter of 08/08/24   Head CT w/o contrast    Impression    IMPRESSION:    1.  No evidence of acute traumatic intracranial abnormality.  2.  Left parietal scalp contusion/laceration. No subjacent calvarial fracture.  3.  Chronic changes as above.   CT Cervical Spine w/o Contrast    Impression    IMPRESSION:  CERVICAL SPINE CT:  1.  No CT evidence for acute fracture or traumatic subluxation involving the cervical spine.  2.  Multilevel cervical spondylosis, further detailed above.    THORACIC SPINE CT:  1.  Largely fused thoracic spine with acute appearing obliquely oriented fracture involving the T8 vertebral body with extension of fracture line towards a bridging ventral syndesmophyte at T8-T9. No extension of fracture planes into the posterior   elements.  2.  No high-grade spinal canal or neural foraminal  stenosis in the thoracic spine.    Findings were discussed via telephone with Dr. French by myself at 3:35 PM CDT on 82,024.   CT Thoracic Spine w/o Contrast    Impression    IMPRESSION:  CERVICAL SPINE CT:  1.  No CT evidence for acute fracture or traumatic subluxation involving the cervical spine.  2.  Multilevel cervical spondylosis, further detailed above.    THORACIC SPINE CT:  1.  Largely fused thoracic spine with acute appearing obliquely oriented fracture involving the T8 vertebral body with extension of fracture line towards a bridging ventral syndesmophyte at T8-T9. No extension of fracture planes into the posterior   elements.  2.  No high-grade spinal canal or neural foraminal stenosis in the thoracic spine.    Findings were discussed via telephone with Dr. French by myself at 3:35 PM CDT on 82,024.   CT Lumbar Spine w/o Contrast    Impression    IMPRESSION:  1.  Minimal age indeterminate progression of a moderate L2 vertebral body compression fracture.   2.  Unchanged severe L4-5 canal stenosis.   Thoracic spine MRI w/o contrast    Impression    IMPRESSION:  1.  Acute to subacute fracture extending through the T8 vertebral body without involvement of the posterior wall. Possible involvement of the anterior longitudinal ligament, difficult to assess.  2.  Chronic L2 vertebral body compression deformity without definite acute/subacute component, somewhat difficult to assess on this study. Recommend dedicated lumbar spine MRI if there is continued concern.   XR Thoracic Lumbar Standing 2 Views    Impression    IMPRESSION: Compression fracture findings involve the superior endplate of L2 with slight bony retropulsion. The T8 fracture findings are better delineated on MRI and no new listhesis findings identified. Are not well seen on the plain film. Diffuse bony   osteopenic changes.    XR Chest Port 1 View    Impression    IMPRESSION: No acute cardiopulmonary abnormality. Old bilateral humeral fractures.   XR  Shoulder Right Port G/E 2 Views    Impression    IMPRESSION: No acute fracture or malalignment. Severe glenohumeral and acromioclavicular joint degenerative changes with bone-on-bone articulation and osteophytosis. Osteopenia. Chronic lung changes. Atherosclerosis.   CT Head w/o Contrast    Impression    IMPRESSION:  1.  New small right parietal scalp hematoma without acute intracranial process.  2.  Decreased size of the left parietal scalp hematoma since 8/8/2024.   XR Elbow Right 2 Views    Impression    IMPRESSION: Bones are demineralized. No acute displaced elbow fracture identified. No joint effusion. Joint spaces are maintained.   XR Shoulder Right 2 Views    Impression    IMPRESSION: Bones are demineralized. Acute mildly displaced fracture of the right distal clavicle near the AC joint. There is a curvilinear ossific density and bony irregularity along the superior margin of the scapula, and there appears to be offset   along the superior scapular body/spine region. Findings raise concern for a comminuted scapular fracture and CT of the shoulder is recommended for further evaluation.    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 No definitive glenohumeral dislocation there is some inferior positioning of the humeral head with   respect to the glenoid which appears similar to the 2/22/2024 study.     NOTE: ABNORMAL REPORT    THE DICTATION ABOVE DESCRIBES AN ABNORMALITY FOR WHICH FOLLOW-UP IS NEEDED.    CT Shoulder Right w/o Contrast    Impression    IMPRESSION:  1.  Mildly comminuted but not significantly displaced fracture of the distal clavicle.  2.  Old healed fracture of the proximal humerus with residual posttraumatic deformity.  3.  Superimposed degenerative change at both the glenohumeral and acromioclavicular joints.  4.  Additional nondisplaced fracture of the supraspinous portion of the  scapula.  5.  Slight soft tissue swelling surrounding the clavicular fracture but no evidence for organized hematoma.  6.  Exam otherwise negative.         SIGNIFICANT LABORATORY FINDINGS     See emr    Discharge Orders        XR Thoracic Spine 2 Views     Spine  Referral      General info for SNF    Length of Stay Estimate: Short Term Care: Estimated # of Days 31-90  Condition at Discharge: Stable  Level of care:skilled   Rehabilitation Potential: Good  Admission H&P remains valid and up-to-date: Yes  Recent Chemotherapy: N/A  Use Nursing Home Standing Orders: Yes  Free of communicable diseases: Yes     Mantoux instructions    Give two-step Mantoux (PPD) Per Facility Policy Yes     Follow Up and recommended labs and tests    Follow up with intermediate physician.  The following labs/tests are recommended: BMP and Hgb in 1 week. TSH in 3 months.  Follow up with Neurosurgery in 3 weeks for X Rays. Call [facility] at [phone] to schedule.  Follow up with [facility] shoulder specialist in 2 weeks. Call [facility] at [phone] to schedule.     Reason for your hospital stay    Vertebral compression fracture, clavicle fracture, falls     Activity - Up with nursing assistance     Weight bearing status    NWB RUE     Additional Discharge Instructions    Lumbar brace on whenever HOB >30 degrees and whenever OOB. No lifting >10 lbs. No repetitive twisting or bending.  Wear RUE sling at all times except for hygiene.     Full Code     Physical Therapy Adult Consult    Evaluate and treat as clinically indicated.    Reason:  Weakness, deconditioning     Occupational Therapy Adult Consult    Evaluate and treat as clinically indicated.    Reason:  Weakness, deconditioning     Fall precautions     Diet    Follow this diet upon discharge:     Regular Diet Adult       Examination   Physical Exam   Temp:  [97.6  F (36.4  C)-98  F (36.7  C)] 97.6  F (36.4  C)  Pulse:  [103-109] 106  Resp:   [18] 18  BP: (118-174)/(59-85) 118/61  SpO2:  [90 %-95 %] 90 %  Wt Readings from Last 1 Encounters:   08/09/24 87 kg (191 lb 12.8 oz)       General: in no apparent distress, non-toxic, and alert female sitting in bedside chair oriented x3. Perseverating on topic of her constipation  HEENT: Head normocephalic atraumatic, oral mucosa moist. Sclerae anicteric  Skin: No rashes or lesions  Extremities: No peripheral edema  Psych: Normal affect, irritable mood.  Neuro: Grossly normal    Called and updated son Ray about discharge plan    Please see EMR for more detailed significant labs, imaging, consultant notes etc.    I, Bertha Birch MD, personally saw the patient today and spent greater than 30 minutes discharging this patient.    Bertha Birch MD  [facility]    CC:Bobby Quiroga

## 2024-08-16 NOTE — PROGRESS NOTES
Physical Therapy Discharge Summary    Reason for therapy discharge:    Discharged to transitional care facility.    Progress towards therapy goal(s). See goals on Care Plan in [facility] electronic health record for goal details.  Goals not met.  Barriers to achieving goals:   discharge from facility.    Therapy recommendation(s):    Continued therapy is recommended.  Rationale/Recommendations:  TCU to progress independence with functional mobility.

## 2024-08-16 NOTE — PLAN OF CARE
Occupational Therapy Discharge Summary    Reason for therapy discharge:    Discharged to transitional care facility.    Progress towards therapy goal(s). See goals on Care Plan in [facility] electronic health record for goal details.  Goals partially met.  Barriers to achieving goals:   discharge from facility.    Therapy recommendation(s):    Continued therapy is recommended.  Rationale/Recommendations:  Pt would benefit from cont OT in TCU setting to further improve IND and increase safety.    MAGEN Melendez/L. 8/16/2024, 9:31 AM

## 2024-08-16 NOTE — PROGRESS NOTES
11 staples removed from patients scalp. Area is reddened with scant bloody drainage. Patient tolerated it well.  Maria G Townsend RN

## 2024-08-16 NOTE — PROGRESS NOTES
Care Management Discharge Note    Discharge Date: 08/16/2024       Discharge Disposition: Transitional Care    Discharge Services: None    Discharge DME:      Discharge Transportation: health plan transportation (WC transport at 11:30 AM ([phone]))    Private pay costs discussed: transportation costs    Does the patient's insurance plan have a 3 day qualifying hospital stay waiver?  No    PAS Confirmation Code: [id number]  Patient/family educated on Medicare website which has current facility and service quality ratings: yes    Education Provided on the Discharge Plan: Yes  Persons Notified of Discharge Plans: ALLISON Brewer  Patient/Family in Agreement with the Plan:  yes    Handoff Referral Completed: Yes    Additional Information:    Received VM from Janet Edwards ([phone]) overnight - changed their mind about family transporting, asks to set up a WC ride. She reported that son will pay for the cost, in agreement with cost - asks if we need a credit card number or if it gets billed.    VANDANA called out to Grace - no answer, left VM. VANDANA stated that WC ride can be set up and cost will be billed.       ---[employer] wheelchair ride set for 11:30 AM window today to [facility].       MD/staff updated.       LUIS Casarez

## 2024-08-16 NOTE — PLAN OF CARE
Problem: Comorbidity Management  Goal: Blood Pressure in Desired Range  Intervention: Maintain Blood Pressure Management  Recent Flowsheet Documentation  Taken 8/15/2024 1640 by Shawna Frias RN  Medication Review/Management: medications reviewed     Problem: Constipation  Goal: Effective Bowel Elimination  Outcome: Progressing  Intervention: Promote Effective Bowel Elimination  Recent Flowsheet Documentation  Taken 8/15/2024 1640 by Shawna Frias RN  Bowel Elimination Management:   enema given   suppository given     Problem: Orthopaedic Fracture  Goal: Absence of Bleeding  Outcome: Progressing  Intervention: Monitor and Manage Fracture Bleeding  Recent Flowsheet Documentation  Taken 8/15/2024 1640 by Shawna Frias RN  Fracture Immobilization: immobilization device maintained   Goal Outcome Evaluation:  Patient spent a fair shift. Continue to perseverate on the discharge plan stating she does not think that her condition is stable enough for discharge. Patient continue to perseverate on bowel movement. Used the commode, voided but no bowel movement this shift. PureWick in use at bedtime. Patient sleeps in the chair per her baseline.

## 2024-08-16 NOTE — PLAN OF CARE
Problem: Fall Injury Risk  Goal: Absence of Fall and Fall-Related Injury  Outcome: Progressing  Intervention: Identify and Manage Contributors  Recent Flowsheet Documentation  Taken 8/16/2024 0200 by Ivana Ellis RN  Medication Review/Management: medications reviewed  Intervention: Promote Injury-Free Environment  Recent Flowsheet Documentation  Taken 8/16/2024 0200 by Ivana Ellis RN  Safety Promotion/Fall Prevention:   activity supervised   assistive device/personal items within reach   clutter free environment maintained   lighting adjusted   nonskid shoes/slippers when out of bed   room door open   safety round/check completed     Problem: Orthopaedic Fracture  Goal: Fracture Stability  Outcome: Progressing     Problem: Orthopaedic Fracture  Goal: Optimal Functional Ability  Outcome: Progressing  Intervention: Optimize Functional Ability  Recent Flowsheet Documentation  Taken 8/16/2024 0200 by Ivana Ellis RN  Activity Management:   activity adjusted per tolerance   activity encouraged   up ad scott   up in chair  Positioning/Transfer Devices:   pillows   in use  Taken 8/16/2024 0132 by Ivana Ellis RN  Positioning/Transfer Devices:   pillows   in use     Problem: Orthopaedic Fracture  Goal: Absence of Infection Signs and Symptoms  Outcome: Progressing     Problem: Orthopaedic Fracture  Goal: Optimal Pain Control and Function  Outcome: Progressing     Problem: Constipation  Goal: Effective Bowel Elimination  Outcome: Progressing     Problem: Comorbidity Management  Goal: Blood Pressure in Desired Range  Outcome: Progressing  Intervention: Maintain Blood Pressure Management  Recent Flowsheet Documentation  Taken 8/16/2024 0200 by Ivana Ellis RN  Medication Review/Management: medications reviewed     Goal Outcome Evaluation:  Pt is alert and oriented x4. Small bright red bleeding noted from staple removal site on scalp. Pt touched staple removal site due to itchiness. Reminders to not touch site given. Pt sitting and  sleeping in recliner per her baseline. Towards the end of the shift, pt was brought into bed and sleeping.

## 2024-08-19 ENCOUNTER — TRANSITIONAL CARE UNIT VISIT (OUTPATIENT)
Dept: GERIATRICS | Facility: CLINIC | Age: 89
End: 2024-08-19
Payer: MEDICARE

## 2024-08-19 ENCOUNTER — DOCUMENTATION ONLY (OUTPATIENT)
Dept: GERIATRICS | Facility: CLINIC | Age: 89
End: 2024-08-19

## 2024-08-19 ENCOUNTER — LAB REQUISITION (OUTPATIENT)
Dept: LAB | Facility: CLINIC | Age: 89
End: 2024-08-19
Payer: MEDICARE

## 2024-08-19 VITALS
DIASTOLIC BLOOD PRESSURE: 63 MMHG | RESPIRATION RATE: 18 BRPM | BODY MASS INDEX: 35.66 KG/M2 | OXYGEN SATURATION: 92 % | TEMPERATURE: 97.3 F | HEIGHT: 62 IN | SYSTOLIC BLOOD PRESSURE: 142 MMHG | WEIGHT: 193.8 LBS | HEART RATE: 90 BPM

## 2024-08-19 DIAGNOSIS — I10 PRIMARY HYPERTENSION: ICD-10-CM

## 2024-08-19 DIAGNOSIS — S42.034D CLOSED NONDISPLACED FRACTURE OF ACROMIAL END OF RIGHT CLAVICLE WITH ROUTINE HEALING: ICD-10-CM

## 2024-08-19 DIAGNOSIS — J44.9 CHRONIC OBSTRUCTIVE PULMONARY DISEASE, UNSPECIFIED COPD TYPE (H): ICD-10-CM

## 2024-08-19 DIAGNOSIS — N18.32 CHRONIC KIDNEY DISEASE, STAGE 3B (H): ICD-10-CM

## 2024-08-19 DIAGNOSIS — K59.01 SLOW TRANSIT CONSTIPATION: ICD-10-CM

## 2024-08-19 DIAGNOSIS — R53.81 PHYSICAL DECONDITIONING: Primary | ICD-10-CM

## 2024-08-19 DIAGNOSIS — D64.9 ANEMIA, UNSPECIFIED: ICD-10-CM

## 2024-08-19 DIAGNOSIS — S22.069S CLOSED FRACTURE OF EIGHTH THORACIC VERTEBRA, UNSPECIFIED FRACTURE MORPHOLOGY, SEQUELA: ICD-10-CM

## 2024-08-19 PROBLEM — E66.01 MORBID OBESITY (H): Status: RESOLVED | Noted: 2021-05-25 | Resolved: 2024-08-19

## 2024-08-19 PROCEDURE — 99309 SBSQ NF CARE MODERATE MDM 30: CPT | Performed by: NURSE PRACTITIONER

## 2024-08-19 NOTE — LETTER
8/19/2024      Sussy Cole  [address]  [facility] [address]        [facility] GERIATRICS    PRIMARY CARE PROVIDER AND CLINIC:  Bobby Quiroga NP, [address] / [facility] [address]  Chief Complaint   Patient presents with     Hospital F/U      [facility] Medical Record Number:  [id number]  Place of Service where encounter took place:  [facility] (U) [[id number]]    Sussy Cole  is a 89 year old  (1935), admitted to the above facility from  [facility]. Hospital stay 8/8/24/ through 8/16/24. Patient with PMH TIA, intracranial atherosclerosis, carotid artery stenosis, COPD, anemia, HTN, CKD3 and breast cancer presented after a fall. She was found to have a T8 fracture. Neurosurgery recommended a brace x 12 weeks. She had another fall on 8/13 while in the hospital, found to have a minimally displaced R distal clavicle fracture and nondisplaced R scapula fracture. She is NWB to E, sling at all times.     HPI obtained from patient visit, review of nursing home record, discussion with facility staff, and Epic review.     HPI:    Patient is aware of all her injuries. She says her pain is in her mid back and the top of her right shoulder for the most part. She thinks she may have hurt her shoulder in her initial fall, but it took some time to convince them to order the CT scan (xrays did show fractures). She had some severe constipation in the hospital, she says this is better now. Most of the visit is spent with her chatting about her past. She says [place] farm, does not have much formal education. She prefers if we avoid using medical terms.      CODE STATUS/ADVANCE DIRECTIVES DISCUSSION:  No CPR- Pre-arrest intubation OK    ALLERGIES:   Allergies   Allergen Reactions     Hydrocodone-Acetaminophen Nausea and Vomiting     Amoxicillin Other (See Comments)     Sores in mouth, tongue slightly swollen     Hydrochlorothiazide Unknown      "Levofloxacin Other (See Comments)     \"mouth gets raw\"     Morphine Nausea and Vomiting      PAST MEDICAL HISTORY:   Past Medical History:   Diagnosis Date     Breast cancer (H)      Closed compression fracture of L2 vertebra, initial encounter (H) 06/02/2021     COPD (chronic obstructive pulmonary disease) (H)      Duodenal ulcer 04/2020     Duodenal ulcer due to nonsteroidal anti-inflammatory drug (NSAID) 04/03/2020    Formatting of this note might be different from the original. 3-26-20  IR embolization on 3-29-20, multiple units of blood. Admit hgb  6.1. aleve stopped. Patient had been taking aleve with prednisone.      Gastrointestinal hemorrhage with melena 03/26/2020    Added automatically from request for surgery [id number] Formatting of this note might be different from the original. Added automatically from request for surgery [id number]      History of humerus fracture 04/29/2021     HTN (hypertension)      Osteoporosis      Status post shoulder surgery 03/24/2009 12/14/2007  Unremarkable.  Repeat in 10 years Formatting of this note might be different from the original. 12/14/2007  Unremarkable.  Repeat in 10 years      Thyroid disease       PAST SURGICAL HISTORY:   has a past surgical history that includes IR Visceral Angiogram (3/29/2020); Midline Insertion - Double Lumen (03/29/2020); Ir Mesenteric Angiogram (03/29/2020); Pr Esophagogastroduodenoscopy Transoral Diagnostic (N/A, 03/29/2020); Lumpectomy breast; back surgery; and Bone marrow biopsy, bone specimen, needle/trocar (Right, 4/14/2022).  FAMILY HISTORY: family history includes Glaucoma in her brother.  SOCIAL HISTORY:   reports that she has quit smoking. She has never used smokeless tobacco. She reports that she does not drink alcohol and does not use drugs.  Patient's living condition: lives with family, daughter     Post Discharge Medication Reconciliation Status:   MED REC REQUIRED  Post Medication Reconciliation Status:  Discharge medications " reconciled, continue medications without change       Current Outpatient Medications   Medication Sig Dispense Refill     acetaminophen (TYLENOL) 325 MG tablet Take 3 tablets (975 mg) by mouth 3 times daily       allopurinoL (ZYLOPRIM) 100 MG tablet [ALLOPURINOL (ZYLOPRIM) 100 MG TABLET] Take 100 mg by mouth 2 (two) times a day.        amLODIPine (NORVASC) 5 MG tablet Take 1 tablet by mouth daily       aspirin (ASA) 81 MG chewable tablet Take 1 tablet (81 mg) by mouth daily 90 tablet 0     atorvastatin (LIPITOR) 20 MG tablet TAKE 1 TABLET (20 MG) BY MOUTH DAILY 90 tablet 0     bisacodyl (DULCOLAX) 10 MG suppository Place 1 suppository (10 mg) rectally daily as needed for constipation       calcium-vitamin D3-vitamin K (VIACTIV) 650 mg-12.5 mcg-40 mcg Chew [CALCIUM-VITAMIN D3-VITAMIN K (VIACTIV) 650 MG-12.5 MCG-40 MCG CHEW] Chew 1 tablet 2 (two) times a day.        clotrimazole-betamethasone (LOTRISONE) 1-0.05 % external cream Apply topically 2 times daily as needed       diclofenac sodium (VOLTAREN) 1 % Gel [DICLOFENAC SODIUM (VOLTAREN) 1 % GEL] Apply 2 g topically 4 (four) times a day as needed. 2g to LUE, 4g to LLE  0     [START ON 8/23/2024] ferrous sulfate (FEROSUL) 325 (65 Fe) MG tablet Take 1 tablet (325 mg) by mouth daily (with breakfast) Resume once constipation resolved       furosemide (LASIX) 20 MG tablet Take 20 mg by mouth daily       ipratropium - albuterol 0.5 mg/2.5 mg/3 mL (DUONEB) 0.5-2.5 (3) MG/3ML neb solution Take 1 vial by nebulization 2 times daily       levothyroxine (SYNTHROID, LEVOTHROID) 112 MCG tablet [LEVOTHYROXINE (SYNTHROID, LEVOTHROID) 112 MCG TABLET] Take 112 mcg by mouth Daily at 6:00 am.        Lidocaine (LIDOCARE) 4 % Patch Place 2 patches onto the skin every 24 hours To prevent lidocaine toxicity, patient should be patch free for 12 hrs daily. 20 patch 0     multivitamin with minerals (THERA-M) 9 mg iron-400 mcg Tab tablet [MULTIVITAMIN WITH MINERALS (THERA-M) 9 MG IRON-400 MCG  "TAB TABLET] Take 1 tablet by mouth daily.        omeprazole (PRILOSEC) 20 MG capsule [OMEPRAZOLE (PRILOSEC) 20 MG CAPSULE] Take 1 capsule (20 mg total) by mouth 2 (two) times a day before meals. 60 capsule 0     oxyCODONE (ROXICODONE) 5 MG tablet Take 0.5 tablets (2.5 mg) by mouth every 6 hours as needed for severe pain 6 tablet 0     polyethylene glycol (MIRALAX) 17 GM/Dose powder Take 17 g by mouth 2 times daily If stool hard or difficult to pass, continue BID. If loose stool 1-2x daily, continue current dose. If loose stool >2x daily, decrease dose to once daily. If continued loose stool >2x daily, change to BID PRN constipation.       senna-docusate (SENOKOT-S/PERICOLACE) 8.6-50 MG tablet Take 1 tablet by mouth 2 times daily If stool hard or difficult to pass, continue BID. If loose stool 1-2x daily, continue current dose. If loose stool >2x daily, decrease dose to once daily. If continued loose stool >2x daily, change to BID PRN constipation.       No current facility-administered medications for this visit.       ROS:  4 point ROS including Respiratory, CV, GI and , other than that noted in the HPI,  is negative    Vitals:  BP (!) 142/63   Pulse 90   Temp 97.3  F (36.3  C)   Resp 18   Ht 1.575 m (5' 2\")   Wt 87.9 kg (193 lb 12.8 oz)   SpO2 92%   BMI 35.45 kg/m    Exam:  GENERAL APPEARANCE:  Alert, in no distress, oriented  ENT:  Mouth and posterior oropharynx normal, moist mucous membranes  EYES:  EOM normal, conjunctiva and lids normal  RESP:  no respiratory distress  M/S:   back brace and arm sling in place  PSYCH:  oriented X 3, affect and mood normal    Lab/Diagnostic data:  Recent labs in [facility] reviewed by me today.     ASSESSMENT/PLAN:  (R53.81) Physical deconditioning  (primary encounter diagnosis)  Comment: Based on ER note, her fall at home was due to her tripping on gravel. The fall in the hospital was loss of balance. It does not sound as though she is falling frequently. PT/OT will " certainly assess for any high risk areas  Plan: PT/OT eval and treat, discharge planning per their recommendations.    (S22.069S) Closed fracture of eighth thoracic vertebra, unspecified fracture morphology, sequela  Comment: Pain controlled, but she has not exerted too much in therapy yet.   Plan: Continue current POC with no changes at this time and adjustments as needed. F/up with neurosurgery as recommended    (S42.034D) Closed nondisplaced fracture of acromial end of right clavicle with routine healing  Comment: Non-operative management with rest and NWB.   Plan: Continue current POC with no changes at this time and adjustments as needed. F/up with ortho as recommended    (J44.9) Chronic obstructive pulmonary disease, unspecified COPD type (H)  Comment: Chronic condition being managed with medications and is currently asymptomatic.  Plan: Continue current POC with no changes at this time and adjustments as needed.    (I10) Primary hypertension  Comment: Some mildly elevated BP readings. To avoid risk of hypotension, falls, dizziness and tissue hypoperfusion, recommend  BP goal is < 150/90mmHg.  Plan: Continue current POC with no changes at this time and adjustments as needed.    (N18.32) Chronic kidney disease, stage 3b (H)  Comment: Baseline creatinine around 1.   Plan: Avoid nephrotoxic medications and adjust medications per renal function. Monitor renal function as needed.    (K59.01) Slow transit constipation  Comment: Controlled with current regimen.   Plan: Continue Miralax and Senna-S. Monitor bowel function. Adjust medication as clinically indicated.      Electronically signed by:  Elizabeth Vincent, JUNIE CNP                     Sincerely,        JUNIE Wagoner CNP

## 2024-08-19 NOTE — PROGRESS NOTES
"[facility] GERIATRICS    PRIMARY CARE PROVIDER AND CLINIC:  Bobby Quiroga NP, [address] [facility] / [facility] [address]  Chief Complaint   Patient presents with    Hospital F/U      [facility] Medical Record Number:  [id number]  Place of Service where encounter took place:  [facility] (U) [[id number]]    Sussy Cole  is a 89 year old  (1935), admitted to the above facility from  [facility]. Hospital stay 8/8/24/ through 8/16/24. Patient with PMH TIA, intracranial atherosclerosis, carotid artery stenosis, COPD, anemia, HTN, CKD3 and breast cancer presented after a fall. She was found to have a T8 fracture. Neurosurgery recommended a brace x 12 weeks. She had another fall on 8/13 while in the hospital, found to have a minimally displaced R distal clavicle fracture and nondisplaced R scapula fracture. She is NWB to RUE, sling at all times.     HPI obtained from patient visit, review of nursing home record, discussion with facility staff, and Epic review.     HPI:    Patient is aware of all her injuries. She says her pain is in her mid back and the top of her right shoulder for the most part. She thinks she may have hurt her shoulder in her initial fall, but it took some time to convince them to order the CT scan (xrays did show fractures). She had some severe constipation in the hospital, she says this is better now. Most of the visit is spent with her chatting about her past. She says she was raised [place] a farm, does not have much formal education. She prefers if we avoid using medical terms.      CODE STATUS/ADVANCE DIRECTIVES DISCUSSION:  No CPR- Pre-arrest intubation OK    ALLERGIES:   Allergies   Allergen Reactions    Hydrocodone-Acetaminophen Nausea and Vomiting    Amoxicillin Other (See Comments)     Sores in mouth, tongue slightly swollen    Hydrochlorothiazide Unknown    Levofloxacin Other (See Comments)     \"mouth gets raw\"    Morphine Nausea and Vomiting    "   PAST MEDICAL HISTORY:   Past Medical History:   Diagnosis Date    Breast cancer (H)     Closed compression fracture of L2 vertebra, initial encounter (H) 06/02/2021    COPD (chronic obstructive pulmonary disease) (H)     Duodenal ulcer 04/2020    Duodenal ulcer due to nonsteroidal anti-inflammatory drug (NSAID) 04/03/2020    Formatting of this note might be different from the original. 3-26-20  IR embolization on 3-29-20, multiple units of blood. Admit hgb  6.1. aleve stopped. Patient had been taking aleve with prednisone.     Gastrointestinal hemorrhage with melena 03/26/2020    Added automatically from request for surgery [id number] Formatting of this note might be different from the original. Added automatically from request for surgery [id number]     History of humerus fracture 04/29/2021    HTN (hypertension)     Osteoporosis     Status post shoulder surgery 03/24/2009 12/14/2007  Unremarkable.  Repeat in 10 years Formatting of this note might be different from the original. 12/14/2007  Unremarkable.  Repeat in 10 years     Thyroid disease       PAST SURGICAL HISTORY:   has a past surgical history that includes IR Visceral Angiogram (3/29/2020); Midline Insertion - Double Lumen (03/29/2020); Ir Mesenteric Angiogram (03/29/2020); Pr Esophagogastroduodenoscopy Transoral Diagnostic (N/A, 03/29/2020); Lumpectomy breast; back surgery; and Bone marrow biopsy, bone specimen, needle/trocar (Right, 4/14/2022).  FAMILY HISTORY: family history includes Glaucoma in her brother.  SOCIAL HISTORY:   reports that she has quit smoking. She has never used smokeless tobacco. She reports that she does not drink alcohol and does not use drugs.  Patient's living condition: lives with family, daughter     Post Discharge Medication Reconciliation Status:   MED REC REQUIRED  Post Medication Reconciliation Status:  Discharge medications reconciled, continue medications without change       Current Outpatient Medications   Medication Sig  Dispense Refill    acetaminophen (TYLENOL) 325 MG tablet Take 3 tablets (975 mg) by mouth 3 times daily      allopurinoL (ZYLOPRIM) 100 MG tablet [ALLOPURINOL (ZYLOPRIM) 100 MG TABLET] Take 100 mg by mouth 2 (two) times a day.       amLODIPine (NORVASC) 5 MG tablet Take 1 tablet by mouth daily      aspirin (ASA) 81 MG chewable tablet Take 1 tablet (81 mg) by mouth daily 90 tablet 0    atorvastatin (LIPITOR) 20 MG tablet TAKE 1 TABLET (20 MG) BY MOUTH DAILY 90 tablet 0    bisacodyl (DULCOLAX) 10 MG suppository Place 1 suppository (10 mg) rectally daily as needed for constipation      calcium-vitamin D3-vitamin K (VIACTIV) 650 mg-12.5 mcg-40 mcg Chew [CALCIUM-VITAMIN D3-VITAMIN K (VIACTIV) 650 MG-12.5 MCG-40 MCG CHEW] Chew 1 tablet 2 (two) times a day.       clotrimazole-betamethasone (LOTRISONE) 1-0.05 % external cream Apply topically 2 times daily as needed      diclofenac sodium (VOLTAREN) 1 % Gel [DICLOFENAC SODIUM (VOLTAREN) 1 % GEL] Apply 2 g topically 4 (four) times a day as needed. 2g to LUE, 4g to LLE  0    [START ON 8/23/2024] ferrous sulfate (FEROSUL) 325 (65 Fe) MG tablet Take 1 tablet (325 mg) by mouth daily (with breakfast) Resume once constipation resolved      furosemide (LASIX) 20 MG tablet Take 20 mg by mouth daily      ipratropium - albuterol 0.5 mg/2.5 mg/3 mL (DUONEB) 0.5-2.5 (3) MG/3ML neb solution Take 1 vial by nebulization 2 times daily      levothyroxine (SYNTHROID, LEVOTHROID) 112 MCG tablet [LEVOTHYROXINE (SYNTHROID, LEVOTHROID) 112 MCG TABLET] Take 112 mcg by mouth Daily at 6:00 am.       Lidocaine (LIDOCARE) 4 % Patch Place 2 patches onto the skin every 24 hours To prevent lidocaine toxicity, patient should be patch free for 12 hrs daily. 20 patch 0    multivitamin with minerals (THERA-M) 9 mg iron-400 mcg Tab tablet [MULTIVITAMIN WITH MINERALS (THERA-M) 9 MG IRON-400 MCG TAB TABLET] Take 1 tablet by mouth daily.       omeprazole (PRILOSEC) 20 MG capsule [OMEPRAZOLE (PRILOSEC) 20 MG  "CAPSULE] Take 1 capsule (20 mg total) by mouth 2 (two) times a day before meals. 60 capsule 0    oxyCODONE (ROXICODONE) 5 MG tablet Take 0.5 tablets (2.5 mg) by mouth every 6 hours as needed for severe pain 6 tablet 0    polyethylene glycol (MIRALAX) 17 GM/Dose powder Take 17 g by mouth 2 times daily If stool hard or difficult to pass, continue BID. If loose stool 1-2x daily, continue current dose. If loose stool >2x daily, decrease dose to once daily. If continued loose stool >2x daily, change to BID PRN constipation.      senna-docusate (SENOKOT-S/PERICOLACE) 8.6-50 MG tablet Take 1 tablet by mouth 2 times daily If stool hard or difficult to pass, continue BID. If loose stool 1-2x daily, continue current dose. If loose stool >2x daily, decrease dose to once daily. If continued loose stool >2x daily, change to BID PRN constipation.       No current facility-administered medications for this visit.       ROS:  4 point ROS including Respiratory, CV, GI and , other than that noted in the HPI,  is negative    Vitals:  BP (!) 142/63   Pulse 90   Temp 97.3  F (36.3  C)   Resp 18   Ht 1.575 m (5' 2\")   Wt 87.9 kg (193 lb 12.8 oz)   SpO2 92%   BMI 35.45 kg/m    Exam:  GENERAL APPEARANCE:  Alert, in no distress, oriented  ENT:  Mouth and posterior oropharynx normal, moist mucous membranes  EYES:  EOM normal, conjunctiva and lids normal  RESP:  no respiratory distress  M/S:   back brace and arm sling in place  PSYCH:  oriented X 3, affect and mood normal    Lab/Diagnostic data:  Recent labs in [facility] reviewed by me today.     ASSESSMENT/PLAN:  (R53.81) Physical deconditioning  (primary encounter diagnosis)  Comment: Based on ER note, her fall at home was due to her tripping on gravel. The fall in the hospital was loss of balance. It does not sound as though she is falling frequently. PT/OT will certainly assess for any high risk areas  Plan: PT/OT eval and treat, discharge planning per their " recommendations.    (S22.069S) Closed fracture of eighth thoracic vertebra, unspecified fracture morphology, sequela  Comment: Pain controlled, but she has not exerted too much in therapy yet.   Plan: Continue current POC with no changes at this time and adjustments as needed. F/up with neurosurgery as recommended    (S42.288D) Closed nondisplaced fracture of acromial end of right clavicle with routine healing  Comment: Non-operative management with rest and NWB.   Plan: Continue current POC with no changes at this time and adjustments as needed. F/up with ortho as recommended    (J44.9) Chronic obstructive pulmonary disease, unspecified COPD type (H)  Comment: Chronic condition being managed with medications and is currently asymptomatic.  Plan: Continue current POC with no changes at this time and adjustments as needed.    (I10) Primary hypertension  Comment: Some mildly elevated BP readings. To avoid risk of hypotension, falls, dizziness and tissue hypoperfusion, recommend  BP goal is < 150/90mmHg.  Plan: Continue current POC with no changes at this time and adjustments as needed.    (N18.32) Chronic kidney disease, stage 3b (H)  Comment: Baseline creatinine around 1.   Plan: Avoid nephrotoxic medications and adjust medications per renal function. Monitor renal function as needed.    (K59.01) Slow transit constipation  Comment: Controlled with current regimen.   Plan: Continue Miralax and Senna-S. Monitor bowel function. Adjust medication as clinically indicated.      Electronically signed by:  JUNIE Wagoner CNP

## 2024-08-21 ENCOUNTER — TELEPHONE (OUTPATIENT)
Dept: GERIATRICS | Facility: CLINIC | Age: 89
End: 2024-08-21
Payer: MEDICARE

## 2024-08-21 DIAGNOSIS — F41.9 ANXIETY: Primary | ICD-10-CM

## 2024-08-21 RX ORDER — HYDROXYZINE HYDROCHLORIDE 25 MG/1
25 TABLET, FILM COATED ORAL EVERY 6 HOURS PRN
Status: SHIPPED
Start: 2024-08-21 | End: 2024-09-11

## 2024-08-21 NOTE — CONFIDENTIAL NOTE
Patient is requesting something else for anxiety and pain. She does not want narcotics. She is already on acetaminophen and lidocaine patches. Will try adding hydroxyzine    Order:  Hydroxyzine 25mg q6h prn

## 2024-08-22 ENCOUNTER — TRANSITIONAL CARE UNIT VISIT (OUTPATIENT)
Dept: GERIATRICS | Facility: CLINIC | Age: 89
End: 2024-08-22
Payer: MEDICARE

## 2024-08-22 VITALS
DIASTOLIC BLOOD PRESSURE: 70 MMHG | RESPIRATION RATE: 20 BRPM | SYSTOLIC BLOOD PRESSURE: 135 MMHG | HEIGHT: 62 IN | HEART RATE: 75 BPM | TEMPERATURE: 97.7 F | WEIGHT: 193.8 LBS | OXYGEN SATURATION: 91 % | BODY MASS INDEX: 35.66 KG/M2

## 2024-08-22 DIAGNOSIS — L30.8 DERMATITIS ASSOCIATED WITH MOISTURE: ICD-10-CM

## 2024-08-22 DIAGNOSIS — S42.101S CLOSED FRACTURE OF RIGHT SCAPULA, UNSPECIFIED PART OF SCAPULA, SEQUELA: ICD-10-CM

## 2024-08-22 DIAGNOSIS — S42.001S CLOSED DISPLACED FRACTURE OF RIGHT CLAVICLE, UNSPECIFIED PART OF CLAVICLE, SEQUELA: ICD-10-CM

## 2024-08-22 DIAGNOSIS — G45.9 TIA (TRANSIENT ISCHEMIC ATTACK): ICD-10-CM

## 2024-08-22 DIAGNOSIS — F41.9 ANXIETY: ICD-10-CM

## 2024-08-22 DIAGNOSIS — S22.069S CLOSED FRACTURE OF EIGHTH THORACIC VERTEBRA, UNSPECIFIED FRACTURE MORPHOLOGY, SEQUELA: Primary | ICD-10-CM

## 2024-08-22 PROCEDURE — 99309 SBSQ NF CARE MODERATE MDM 30: CPT | Performed by: NURSE PRACTITIONER

## 2024-08-22 RX ORDER — TRAMADOL HYDROCHLORIDE 50 MG/1
25 TABLET ORAL EVERY 8 HOURS PRN
COMMUNITY

## 2024-08-22 RX ORDER — ACETAMINOPHEN 500 MG
1000 TABLET ORAL 3 TIMES DAILY
COMMUNITY

## 2024-08-22 NOTE — PROGRESS NOTES
"                                                                                   [facility]      Code Status:  DNR  Visit Type: RECHECK     Facility:   [facility] ([facility]) [[id number]]         History of Present Illness: Sussy Cole is a 89 year old female with a past medical history for TIA, CAD, COPD, anemia, HTN, CKD3 and Breast Ca.  Recently hospitalized after a fall in which she sustained T8 fracture.  She had a fall in the hospital and sustained right distal clavicle and right scapula fraction.  She was seen by ortho and was recommended to wear an LSO for 12 weeks and to be NWB of RUE with the use of a sling.      Today, she has lots of complaints and states she did not sleep well last night and she requested her \"anxiety medication\" but it wasn't given until late and so she feels this is why she could not sleep.  She is quite agitated during our conversation but does ease at the end of the conversation.  She endorses her pain is 'bad\" when she moves. She does not want to take oxycodone as it causes severe constipation.  She also is having a hard time with the tablet form of apap.      Current Outpatient Medications   Medication Sig Dispense Refill    acetaminophen (TYLENOL) 500 MG tablet Take 1,000 mg by mouth 3 times daily.      traMADol (ULTRAM) 50 MG tablet Take 25 mg by mouth every 8 hours as needed for severe pain.      allopurinoL (ZYLOPRIM) 100 MG tablet [ALLOPURINOL (ZYLOPRIM) 100 MG TABLET] Take 100 mg by mouth 2 (two) times a day.       amLODIPine (NORVASC) 5 MG tablet Take 1 tablet by mouth daily      aspirin (ASA) 81 MG chewable tablet Take 1 tablet (81 mg) by mouth daily 90 tablet 0    atorvastatin (LIPITOR) 20 MG tablet TAKE 1 TABLET (20 MG) BY MOUTH DAILY 90 tablet 0    bisacodyl (DULCOLAX) 10 MG suppository Place 1 suppository (10 mg) rectally daily as needed for constipation      calcium-vitamin D3-vitamin K (VIACTIV) 650 mg-12.5 mcg-40 mcg Chew [CALCIUM-VITAMIN " D3-VITAMIN K (VIACTIV) 650 MG-12.5 MCG-40 MCG CHEW] Chew 1 tablet 2 (two) times a day.       clotrimazole-betamethasone (LOTRISONE) 1-0.05 % external cream Apply topically 2 times daily as needed      diclofenac sodium (VOLTAREN) 1 % Gel [DICLOFENAC SODIUM (VOLTAREN) 1 % GEL] Apply 2 g topically 4 (four) times a day as needed. 2g to LUE, 4g to LLE  0    [START ON 8/23/2024] ferrous sulfate (FEROSUL) 325 (65 Fe) MG tablet Take 1 tablet (325 mg) by mouth daily (with breakfast) Resume once constipation resolved      furosemide (LASIX) 20 MG tablet Take 20 mg by mouth daily      hydrOXYzine HCl (ATARAX) 25 MG tablet Take 1 tablet (25 mg) by mouth every 6 hours as needed for anxiety.      ipratropium - albuterol 0.5 mg/2.5 mg/3 mL (DUONEB) 0.5-2.5 (3) MG/3ML neb solution Take 1 vial by nebulization 2 times daily      levothyroxine (SYNTHROID, LEVOTHROID) 112 MCG tablet [LEVOTHYROXINE (SYNTHROID, LEVOTHROID) 112 MCG TABLET] Take 112 mcg by mouth Daily at 6:00 am.       Lidocaine (LIDOCARE) 4 % Patch Place 2 patches onto the skin every 24 hours To prevent lidocaine toxicity, patient should be patch free for 12 hrs daily. 20 patch 0    multivitamin with minerals (THERA-M) 9 mg iron-400 mcg Tab tablet [MULTIVITAMIN WITH MINERALS (THERA-M) 9 MG IRON-400 MCG TAB TABLET] Take 1 tablet by mouth daily.       omeprazole (PRILOSEC) 20 MG capsule [OMEPRAZOLE (PRILOSEC) 20 MG CAPSULE] Take 1 capsule (20 mg total) by mouth 2 (two) times a day before meals. 60 capsule 0    polyethylene glycol (MIRALAX) 17 GM/Dose powder Take 17 g by mouth 2 times daily If stool hard or difficult to pass, continue BID. If loose stool 1-2x daily, continue current dose. If loose stool >2x daily, decrease dose to once daily. If continued loose stool >2x daily, change to BID PRN constipation.      senna-docusate (SENOKOT-S/PERICOLACE) 8.6-50 MG tablet Take 1 tablet by mouth 2 times daily If stool hard or difficult to pass, continue BID. If loose stool 1-2x  "daily, continue current dose. If loose stool >2x daily, decrease dose to once daily. If continued loose stool >2x daily, change to BID PRN constipation.         Review of Systems   Patient denies fever, chills, headache, lightheadedness, dizziness, rhinorrhea, cough, congestion, shortness of breath, chest pain, palpitations, abdominal pain, n/v, diarrhea, constipation, change in appetite, change in sleep pattern, dysuria, frequency, burning or pain with urination.  Other than stated in HPI all other review of systems is negative.       Physical Exam  Vital signs:/70   Pulse 75   Temp 97.7  F (36.5  C)   Resp 20   Ht 1.575 m (5' 2\")   Wt 87.9 kg (193 lb 12.8 oz)   SpO2 91%   BMI 35.45 kg/m     GENERAL APPEARANCE: Well developed, well nourished, agitated for most of the visit  HEENT: normocephalic, atraumatic  sclerae anicteric, conjunctivae clear and moist, EOM intact  LUNGS: Lung sounds CTA, no adventitious sounds, respiratory effort normal.  CARD: RRR, S1, S2, without murmurs, gallops, rubs  ABD: Soft, nondistended and nontender with normal bowel sounds.   MSK: right UE restricted in movement due to pain  EXTREMITIES: No cyanosis, clubbing or edema.  NEURO: Alert and oriented x 3.. Face is symmetric.  SKIN: intertigo under breasts.   PSYCH: anxious and agitated        Labs:    Last Comprehensive Metabolic Panel:  Lab Results   Component Value Date     08/16/2024    POTASSIUM 4.4 08/16/2024    CHLORIDE 95 (L) 08/16/2024    CO2 27 08/16/2024    ANIONGAP 14 08/16/2024     (H) 08/16/2024    BUN 23.2 (H) 08/16/2024    CR 0.98 (H) 08/16/2024    GFRESTIMATED 56 (L) 08/16/2024    CARMELA 9.2 08/16/2024       Lab Results   Component Value Date    WBC 7.3 08/10/2024     Lab Results   Component Value Date    RBC 2.74 08/10/2024     Lab Results   Component Value Date    HGB 10.2 08/11/2024     Lab Results   Component Value Date    HCT 30.1 08/10/2024     Lab Results   Component Value Date     " 08/10/2024     Lab Results   Component Value Date    MCH 36.1 08/10/2024     Lab Results   Component Value Date    MCHC 32.9 08/10/2024     Lab Results   Component Value Date    RDW 14.8 08/10/2024     Lab Results   Component Value Date     08/10/2024           Assessment/Plan:  Closed fracture of eighth thoracic vertebra, unspecified fracture morphology, sequela  Closed displaced fracture of right clavicle, unspecified part of clavicle, sequela  Closed fracture of right scapula, unspecified part of scapula, sequela  Pain not controlled,  changed Apap to ES 1000mg TID.  Discontinue oxycodone.  Start low dose tramadol every 8 hours.  Cr <1 so could increase tramadol to 50mg if not controlled on next visit.  PT/OT eval and treat.  She needs to follow up with NSG in 3 weeks and Orthopedics in 2 weeks.     TIA (transient ischemic attack)  May have mild cognitive impairment.  OT to do cognitive assessment    Anxiety  States she does not deal with anxiety at home and this is new.  I discuss starting on Zoloft however it was hard to keep patient on subject for consent and so this was not started.  Continue with prn hydroxyzine.   I feel if pain is better controlled anxiety may be less.      Dermatitis associated with moisture  Intertrigo of breasts, patient states she does not want powder or cream.  I ordered interdry daily.  Also barrier cream for perineum.         Electronically signed by:Bertha Sigala NP

## 2024-08-22 NOTE — LETTER
" 8/22/2024      Sussy Cole  [address]  [place] [address]                                                                                           [facility]      Code Status:  DNR  Visit Type: RECHECK     Facility:   [facility] ([facility]) [[id number]]         History of Present Illness: Sussy Cole is a 89 year old female with a past medical history for TIA, CAD, COPD, anemia, HTN, CKD3 and Breast Ca.  Recently hospitalized after a fall in which she sustained T8 fracture.  She had a fall in the hospital and sustained right distal clavicle and right scapula fraction.  She was seen by ortho and was recommended to wear an LSO for 12 weeks and to be NWB of RUE with the use of a sling.      Today, she has lots of complaints and states she did not sleep well last night and she requested her \"anxiety medication\" but it wasn't given until late and so she feels this is why she could not sleep.  She is quite agitated during our conversation but does ease at the end of the conversation.  She endorses her pain is 'bad\" when she moves. She does not want to take oxycodone as it causes severe constipation.  She also is having a hard time with the tablet form of apap.      Current Outpatient Medications   Medication Sig Dispense Refill     acetaminophen (TYLENOL) 500 MG tablet Take 1,000 mg by mouth 3 times daily.       traMADol (ULTRAM) 50 MG tablet Take 25 mg by mouth every 8 hours as needed for severe pain.       allopurinoL (ZYLOPRIM) 100 MG tablet [ALLOPURINOL (ZYLOPRIM) 100 MG TABLET] Take 100 mg by mouth 2 (two) times a day.        amLODIPine (NORVASC) 5 MG tablet Take 1 tablet by mouth daily       aspirin (ASA) 81 MG chewable tablet Take 1 tablet (81 mg) by mouth daily 90 tablet 0     atorvastatin (LIPITOR) 20 MG tablet TAKE 1 TABLET (20 MG) BY MOUTH DAILY 90 tablet 0     bisacodyl (DULCOLAX) 10 MG suppository Place 1 suppository (10 mg) rectally daily as needed for constipation       " calcium-vitamin D3-vitamin K (VIACTIV) 650 mg-12.5 mcg-40 mcg Chew [CALCIUM-VITAMIN D3-VITAMIN K (VIACTIV) 650 MG-12.5 MCG-40 MCG CHEW] Chew 1 tablet 2 (two) times a day.        clotrimazole-betamethasone (LOTRISONE) 1-0.05 % external cream Apply topically 2 times daily as needed       diclofenac sodium (VOLTAREN) 1 % Gel [DICLOFENAC SODIUM (VOLTAREN) 1 % GEL] Apply 2 g topically 4 (four) times a day as needed. 2g to LUE, 4g to LLE  0     [START ON 8/23/2024] ferrous sulfate (FEROSUL) 325 (65 Fe) MG tablet Take 1 tablet (325 mg) by mouth daily (with breakfast) Resume once constipation resolved       furosemide (LASIX) 20 MG tablet Take 20 mg by mouth daily       hydrOXYzine HCl (ATARAX) 25 MG tablet Take 1 tablet (25 mg) by mouth every 6 hours as needed for anxiety.       ipratropium - albuterol 0.5 mg/2.5 mg/3 mL (DUONEB) 0.5-2.5 (3) MG/3ML neb solution Take 1 vial by nebulization 2 times daily       levothyroxine (SYNTHROID, LEVOTHROID) 112 MCG tablet [LEVOTHYROXINE (SYNTHROID, LEVOTHROID) 112 MCG TABLET] Take 112 mcg by mouth Daily at 6:00 am.        Lidocaine (LIDOCARE) 4 % Patch Place 2 patches onto the skin every 24 hours To prevent lidocaine toxicity, patient should be patch free for 12 hrs daily. 20 patch 0     multivitamin with minerals (THERA-M) 9 mg iron-400 mcg Tab tablet [MULTIVITAMIN WITH MINERALS (THERA-M) 9 MG IRON-400 MCG TAB TABLET] Take 1 tablet by mouth daily.        omeprazole (PRILOSEC) 20 MG capsule [OMEPRAZOLE (PRILOSEC) 20 MG CAPSULE] Take 1 capsule (20 mg total) by mouth 2 (two) times a day before meals. 60 capsule 0     polyethylene glycol (MIRALAX) 17 GM/Dose powder Take 17 g by mouth 2 times daily If stool hard or difficult to pass, continue BID. If loose stool 1-2x daily, continue current dose. If loose stool >2x daily, decrease dose to once daily. If continued loose stool >2x daily, change to BID PRN constipation.       senna-docusate (SENOKOT-S/PERICOLACE) 8.6-50 MG tablet Take 1  "tablet by mouth 2 times daily If stool hard or difficult to pass, continue BID. If loose stool 1-2x daily, continue current dose. If loose stool >2x daily, decrease dose to once daily. If continued loose stool >2x daily, change to BID PRN constipation.         Review of Systems   Patient denies fever, chills, headache, lightheadedness, dizziness, rhinorrhea, cough, congestion, shortness of breath, chest pain, palpitations, abdominal pain, n/v, diarrhea, constipation, change in appetite, change in sleep pattern, dysuria, frequency, burning or pain with urination.  Other than stated in HPI all other review of systems is negative.       Physical Exam  Vital signs:/70   Pulse 75   Temp 97.7  F (36.5  C)   Resp 20   Ht 1.575 m (5' 2\")   Wt 87.9 kg (193 lb 12.8 oz)   SpO2 91%   BMI 35.45 kg/m     GENERAL APPEARANCE: Well developed, well nourished, agitated for most of the visit  HEENT: normocephalic, atraumatic  sclerae anicteric, conjunctivae clear and moist, EOM intact  LUNGS: Lung sounds CTA, no adventitious sounds, respiratory effort normal.  CARD: RRR, S1, S2, without murmurs, gallops, rubs  ABD: Soft, nondistended and nontender with normal bowel sounds.   MSK: right UE restricted in movement due to pain  EXTREMITIES: No cyanosis, clubbing or edema.  NEURO: Alert and oriented x 3.. Face is symmetric.  SKIN: intertigo under breasts.   PSYCH: anxious and agitated        Labs:    Last Comprehensive Metabolic Panel:  Lab Results   Component Value Date     08/16/2024    POTASSIUM 4.4 08/16/2024    CHLORIDE 95 (L) 08/16/2024    CO2 27 08/16/2024    ANIONGAP 14 08/16/2024     (H) 08/16/2024    BUN 23.2 (H) 08/16/2024    CR 0.98 (H) 08/16/2024    GFRESTIMATED 56 (L) 08/16/2024    CARMELA 9.2 08/16/2024       Lab Results   Component Value Date    WBC 7.3 08/10/2024     Lab Results   Component Value Date    RBC 2.74 08/10/2024     Lab Results   Component Value Date    HGB 10.2 08/11/2024     Lab Results "   Component Value Date    HCT 30.1 08/10/2024     Lab Results   Component Value Date     08/10/2024     Lab Results   Component Value Date    MCH 36.1 08/10/2024     Lab Results   Component Value Date    MCHC 32.9 08/10/2024     Lab Results   Component Value Date    RDW 14.8 08/10/2024     Lab Results   Component Value Date     08/10/2024           Assessment/Plan:  Closed fracture of eighth thoracic vertebra, unspecified fracture morphology, sequela  Closed displaced fracture of right clavicle, unspecified part of clavicle, sequela  Closed fracture of right scapula, unspecified part of scapula, sequela  Pain not controlled,  changed Apap to ES 1000mg TID.  Discontinue oxycodone.  Start low dose tramadol every 8 hours.  Cr <1 so could increase tramadol to 50mg if not controlled on next visit.  PT/OT eval and treat.  She needs to follow up with NSG in 3 weeks and Orthopedics in 2 weeks.     TIA (transient ischemic attack)  May have mild cognitive impairment.  OT to do cognitive assessment    Anxiety  States she does not deal with anxiety at home and this is new.  I discuss starting on Zoloft however it was hard to keep patient on subject for consent and so this was not started.  Continue with prn hydroxyzine.   I feel if pain is better controlled anxiety may be less.      Dermatitis associated with moisture  Intertrigo of breasts, patient states she does not want powder or cream.  I ordered interdry daily.  Also barrier cream for perineum.         Electronically signed by:Bertha Sigala NP              Sincerely,        Bertha Sigala NP

## 2024-08-23 ENCOUNTER — TELEPHONE (OUTPATIENT)
Dept: GERIATRICS | Facility: CLINIC | Age: 89
End: 2024-08-23
Payer: MEDICARE

## 2024-08-23 LAB
ANION GAP SERPL CALCULATED.3IONS-SCNC: 9 MMOL/L (ref 7–15)
BUN SERPL-MCNC: 25.5 MG/DL (ref 8–23)
CALCIUM SERPL-MCNC: 8.7 MG/DL (ref 8.8–10.4)
CHLORIDE SERPL-SCNC: 106 MMOL/L (ref 98–107)
CREAT SERPL-MCNC: 0.86 MG/DL (ref 0.51–0.95)
EGFRCR SERPLBLD CKD-EPI 2021: 65 ML/MIN/1.73M2
GLUCOSE SERPL-MCNC: 88 MG/DL (ref 70–99)
HCO3 SERPL-SCNC: 26 MMOL/L (ref 22–29)
HGB BLD-MCNC: 8.3 G/DL (ref 11.7–15.7)
POTASSIUM SERPL-SCNC: 4.3 MMOL/L (ref 3.4–5.3)
SODIUM SERPL-SCNC: 141 MMOL/L (ref 135–145)

## 2024-08-23 PROCEDURE — 36415 COLL VENOUS BLD VENIPUNCTURE: CPT | Performed by: INTERNAL MEDICINE

## 2024-08-23 PROCEDURE — 80048 BASIC METABOLIC PNL TOTAL CA: CPT | Performed by: INTERNAL MEDICINE

## 2024-08-23 PROCEDURE — P9604 ONE-WAY ALLOW PRORATED TRIP: HCPCS | Performed by: INTERNAL MEDICINE

## 2024-08-23 PROCEDURE — 82310 ASSAY OF CALCIUM: CPT | Performed by: INTERNAL MEDICINE

## 2024-08-23 PROCEDURE — 85018 HEMOGLOBIN: CPT | Performed by: INTERNAL MEDICINE

## 2024-08-23 RX ORDER — FERROUS SULFATE 325(65) MG
325 TABLET ORAL DAILY
COMMUNITY
Start: 2024-08-23

## 2024-08-23 NOTE — TELEPHONE ENCOUNTER
Orders relayed to facility nurse, Sonu.       ----- Message from Beatriz REYNA sent at 8/23/2024 12:42 PM CDT -----    ----- Message -----  From: Bobbi Caldwell CNP  Sent: 8/23/2024  12:40 PM CDT  To: Beatriz Bergman RN    BMP stable- NNO; Hgb dropped 2 pts, doubt this is soley related to stopping her iron but would resume iron every other day and recheck hgb on Wednesday. Ensure bowel regimen given recent constipation. Appears she had a hematologist in the past but ca  nnot find notes. Could add on MMA and homocystein levels given macroctyic anemia and low normal iron sat in February.      Addendum:  Nurse called back to report that patient is refusing for ferrous sulfate to be given every other day.  She says she was told by her oncologist that she should be on ferrous sulfate daily.      New orders per Yolanda Caldwell FNP:  Resume ferrous sulfate 325mg daily per patient request.  Update patient's oncologist.      Fede Guadalupe RN

## 2024-08-23 NOTE — RESULT ENCOUNTER NOTE
BMP stable- NNO; Hgb dropped 2 pts, doubt this is soley related to stopping her iron but would resume iron every other day and recheck hgb on Wednesday. Ensure bowel regimen given recent constipation. Appears she had a hematologist in the past but cannot find notes. Could add on MMA and homocystein levels given macroctyic anemia and low normal iron sat in February.

## 2024-08-26 ENCOUNTER — LAB REQUISITION (OUTPATIENT)
Dept: LAB | Facility: CLINIC | Age: 89
End: 2024-08-26
Payer: MEDICARE

## 2024-08-26 DIAGNOSIS — R53.1 WEAKNESS: ICD-10-CM

## 2024-08-28 ENCOUNTER — TRANSITIONAL CARE UNIT VISIT (OUTPATIENT)
Dept: GERIATRICS | Facility: CLINIC | Age: 89
End: 2024-08-28
Payer: MEDICARE

## 2024-08-28 VITALS
HEIGHT: 62 IN | DIASTOLIC BLOOD PRESSURE: 60 MMHG | TEMPERATURE: 98 F | HEART RATE: 83 BPM | RESPIRATION RATE: 16 BRPM | BODY MASS INDEX: 34.37 KG/M2 | WEIGHT: 186.8 LBS | OXYGEN SATURATION: 95 % | SYSTOLIC BLOOD PRESSURE: 132 MMHG

## 2024-08-28 DIAGNOSIS — J44.9 CHRONIC OBSTRUCTIVE PULMONARY DISEASE, UNSPECIFIED COPD TYPE (H): ICD-10-CM

## 2024-08-28 DIAGNOSIS — R29.6 RECURRENT FALLS: ICD-10-CM

## 2024-08-28 DIAGNOSIS — L08.9 SCALP ABRASION, INFECTED, INITIAL ENCOUNTER: ICD-10-CM

## 2024-08-28 DIAGNOSIS — Z85.3 HX: BREAST CANCER: ICD-10-CM

## 2024-08-28 DIAGNOSIS — S00.01XA SCALP ABRASION, INFECTED, INITIAL ENCOUNTER: ICD-10-CM

## 2024-08-28 DIAGNOSIS — S42.191D CLOSED FRACTURE OF OTHER PART OF RIGHT SCAPULA WITH ROUTINE HEALING, SUBSEQUENT ENCOUNTER: ICD-10-CM

## 2024-08-28 DIAGNOSIS — N18.30 STAGE 3 CHRONIC KIDNEY DISEASE, UNSPECIFIED WHETHER STAGE 3A OR 3B CKD (H): ICD-10-CM

## 2024-08-28 DIAGNOSIS — S22.060D COMPRESSION FRACTURE OF T8 VERTEBRA WITH ROUTINE HEALING, SUBSEQUENT ENCOUNTER: Primary | ICD-10-CM

## 2024-08-28 DIAGNOSIS — S01.01XD LACERATION OF SCALP, SUBSEQUENT ENCOUNTER: ICD-10-CM

## 2024-08-28 DIAGNOSIS — R53.81 PHYSICAL DECONDITIONING: ICD-10-CM

## 2024-08-28 DIAGNOSIS — I12.9 BENIGN HYPERTENSIVE KIDNEY DISEASE WITH CHRONIC KIDNEY DISEASE STAGE I THROUGH STAGE IV, OR UNSPECIFIED: ICD-10-CM

## 2024-08-28 DIAGNOSIS — S42.001D CLOSED NONDISPLACED FRACTURE OF RIGHT CLAVICLE WITH ROUTINE HEALING, UNSPECIFIED PART OF CLAVICLE, SUBSEQUENT ENCOUNTER: ICD-10-CM

## 2024-08-28 DIAGNOSIS — E03.9 HYPOTHYROIDISM, UNSPECIFIED TYPE: ICD-10-CM

## 2024-08-28 DIAGNOSIS — Z86.73 HISTORY OF TIA (TRANSIENT ISCHEMIC ATTACK) AND STROKE: ICD-10-CM

## 2024-08-28 DIAGNOSIS — K21.9 GASTROESOPHAGEAL REFLUX DISEASE WITHOUT ESOPHAGITIS: ICD-10-CM

## 2024-08-28 DIAGNOSIS — K59.01 SLOW TRANSIT CONSTIPATION: ICD-10-CM

## 2024-08-28 LAB — HGB BLD-MCNC: 9.4 G/DL (ref 11.7–15.7)

## 2024-08-28 PROCEDURE — 36415 COLL VENOUS BLD VENIPUNCTURE: CPT | Performed by: INTERNAL MEDICINE

## 2024-08-28 PROCEDURE — 99305 1ST NF CARE MODERATE MDM 35: CPT | Performed by: INTERNAL MEDICINE

## 2024-08-28 PROCEDURE — P9603 ONE-WAY ALLOW PRORATED MILES: HCPCS | Performed by: INTERNAL MEDICINE

## 2024-08-28 PROCEDURE — 85018 HEMOGLOBIN: CPT | Performed by: INTERNAL MEDICINE

## 2024-08-28 RX ORDER — GUAIFENESIN 600 MG/1
600 TABLET, EXTENDED RELEASE ORAL 2 TIMES DAILY
COMMUNITY

## 2024-08-28 NOTE — LETTER
" 8/28/2024      Sussy Cole  [address]  [place] [address]        M [facility] GERIATRICS  INITIAL VISIT NOTE  August 28, 2024      PRIMARY CARE PROVIDER AND CLINIC:  Bobby Quirogae / [facility] [id number]    M [facility] Medical Record Number:  [id number]  Place of Service where encounter took place:  [facility] (U) [[id number]]    Chief Complaint   Patient presents with     Hospital F/U       HPI:    Sussy Cole is a 89 year old  (1935) female seen today at [facility]. Medical history is notable for HTN, COPD, TIA, bilateral carotid artery stenosis, CKD and breast cancer. She was hospitalized at [facility] from 8/8/2024 to 8/16/2024 where she presented after a fall and was found to have a subacute T8 compression fracture.  Neurosurgery recommended nonoperative management and follow-up in 12 weeks.  Hospital course complicated by a fall on 8/13/2024 with resultant right clavicular and right supraspinous scapular fracture.  Ortho consulted and recommended nonop management and follow-up in 2 weeks.  She sustained a scalp laceration from the initial fall and staples were removed on 8/15/2024. She was admitted to this facility for  rehab, medical management, and nursing care.      History obtained from: facility chart records, facility staff, patient report, and [facility] chart review.      Today, Ms. Cole is seen in her room sitting in her recliner.  Her back pain is controlled.  Her right clavicle \"barks\" from time to time, with it being most comfortable when her arm is bent with her fingers at her navel.  No chest pain or dyspnea.  She wanted me to look at her head as one of the staff commented that she had a bit of a bump.  Her goal is to return home.  She is working with therapies.  No acute concerns today per discussion with nursing.    CODE STATUS: DNR / DNI    ALLERGIES:  Allergies   Allergen Reactions     Hydrocodone-Acetaminophen " "Nausea and Vomiting     Amoxicillin Other (See Comments)     Sores in mouth, tongue slightly swollen     Hydrochlorothiazide Unknown     Levofloxacin Other (See Comments)     \"mouth gets raw\"     Morphine Nausea and Vomiting       PAST MEDICAL HISTORY:   Past Medical History:   Diagnosis Date     Breast cancer (H)      Closed compression fracture of L2 vertebra, initial encounter (H) 06/02/2021     COPD (chronic obstructive pulmonary disease) (H)      Duodenal ulcer 04/2020     Duodenal ulcer due to nonsteroidal anti-inflammatory drug (NSAID) 04/03/2020    Formatting of this note might be different from the original. 3-26-20  IR embolization on 3-29-20, multiple units of blood. Admit hgb  6.1. aleve stopped. Patient had been taking aleve with prednisone.      Gastrointestinal hemorrhage with melena 03/26/2020    Added automatically from request for surgery [id number] Formatting of this note might be different from the original. Added automatically from request for surgery [id number]      History of humerus fracture 04/29/2021     HTN (hypertension)      Osteoporosis      Status post shoulder surgery 03/24/2009 12/14/2007  Unremarkable.  Repeat in 10 years Formatting of this note might be different from the original. 12/14/2007  Unremarkable.  Repeat in 10 years      Thyroid disease        PAST SURGICAL HISTORY:   Past Surgical History:   Procedure Laterality Date     BACK SURGERY       BONE MARROW BIOPSY, BONE SPECIMEN, NEEDLE/TROCAR Right 4/14/2022    Procedure: UNILATERAL BONE MARROW BIOPSY, SIDE DETERMINDED DAY OF PROCEDURE, ILIAC CREST;  Surgeon: Enzo Mitchell MD;  Location: [facility] OR     IR MESENTERIC ANGIOGRAM  03/29/2020     IR VISCERAL ANGIOGRAM  3/29/2020     LUMPECTOMY BREAST       MIDLINE INSERTION - DOUBLE LUMEN  03/29/2020          CO ESOPHAGOGASTRODUODENOSCOPY TRANSORAL DIAGNOSTIC N/A 03/29/2020    Procedure: ESOPHAGOGASTRODUODENOSCOPY (EGD) with Epi injection and cautery;  Surgeon: Jacki, " Pacheco Crow DO;  Location: [facility];  Service: Gastroenterology       FAMILY HISTORY:   Family History   Problem Relation Age of Onset     Glaucoma Brother        SOCIAL HISTORY:   Patient's living condition:  alone in a house    MEDICATIONS:  Post Discharge Medication Reconciliation Status: discharge medications reconciled and changed, per note/orders.  Current Outpatient Medications   Medication Sig Dispense Refill     acetaminophen (TYLENOL) 500 MG tablet Take 1,000 mg by mouth 3 times daily.       allopurinoL (ZYLOPRIM) 100 MG tablet [ALLOPURINOL (ZYLOPRIM) 100 MG TABLET] Take 100 mg by mouth 2 (two) times a day.        amLODIPine (NORVASC) 5 MG tablet Take 1 tablet by mouth daily       aspirin (ASA) 81 MG chewable tablet Take 1 tablet (81 mg) by mouth daily 90 tablet 0     atorvastatin (LIPITOR) 20 MG tablet TAKE 1 TABLET (20 MG) BY MOUTH DAILY 90 tablet 0     bisacodyl (DULCOLAX) 10 MG suppository Place 1 suppository (10 mg) rectally daily as needed for constipation       calcium-vitamin D3-vitamin K (VIACTIV) 650 mg-12.5 mcg-40 mcg Chew [CALCIUM-VITAMIN D3-VITAMIN K (VIACTIV) 650 MG-12.5 MCG-40 MCG CHEW] Chew 1 tablet 2 (two) times a day.        clotrimazole-betamethasone (LOTRISONE) 1-0.05 % external cream Apply topically 2 times daily as needed       diclofenac sodium (VOLTAREN) 1 % Gel [DICLOFENAC SODIUM (VOLTAREN) 1 % GEL] Apply 2 g topically 4 (four) times a day as needed. 2g to LUE, 4g to LLE  0     ferrous sulfate (FEROSUL) 325 (65 Fe) MG tablet Take 325 mg by mouth daily.       furosemide (LASIX) 20 MG tablet Take 20 mg by mouth daily       guaiFENesin (MUCINEX) 600 MG 12 hr tablet Take 600 mg by mouth 2 times daily.       hydrOXYzine HCl (ATARAX) 25 MG tablet Take 1 tablet (25 mg) by mouth every 6 hours as needed for anxiety.       ipratropium - albuterol 0.5 mg/2.5 mg/3 mL (DUONEB) 0.5-2.5 (3) MG/3ML neb solution Take 1 vial by nebulization 2 times daily       levothyroxine (SYNTHROID,  "LEVOTHROID) 112 MCG tablet [LEVOTHYROXINE (SYNTHROID, LEVOTHROID) 112 MCG TABLET] Take 112 mcg by mouth Daily at 6:00 am.        Lidocaine (LIDOCARE) 4 % Patch Place 2 patches onto the skin every 24 hours To prevent lidocaine toxicity, patient should be patch free for 12 hrs daily. 20 patch 0     multivitamin with minerals (THERA-M) 9 mg iron-400 mcg Tab tablet [MULTIVITAMIN WITH MINERALS (THERA-M) 9 MG IRON-400 MCG TAB TABLET] Take 1 tablet by mouth daily.        omeprazole (PRILOSEC) 20 MG capsule [OMEPRAZOLE (PRILOSEC) 20 MG CAPSULE] Take 1 capsule (20 mg total) by mouth 2 (two) times a day before meals. 60 capsule 0     polyethylene glycol (MIRALAX) 17 GM/Dose powder Take 17 g by mouth 2 times daily If stool hard or difficult to pass, continue BID. If loose stool 1-2x daily, continue current dose. If loose stool >2x daily, decrease dose to once daily. If continued loose stool >2x daily, change to BID PRN constipation.       senna-docusate (SENOKOT-S/PERICOLACE) 8.6-50 MG tablet Take 1 tablet by mouth 2 times daily If stool hard or difficult to pass, continue BID. If loose stool 1-2x daily, continue current dose. If loose stool >2x daily, decrease dose to once daily. If continued loose stool >2x daily, change to BID PRN constipation.       traMADol (ULTRAM) 50 MG tablet Take 25 mg by mouth every 8 hours as needed for severe pain.         ROS:  4 point ROS neg other than the symptoms noted above in the HPI.    PHYSICAL EXAM:  /60   Pulse 83   Temp 98  F (36.7  C)   Resp 16   Ht 1.575 m (5' 2\")   Wt 84.7 kg (186 lb 12.8 oz)   SpO2 95%   BMI 34.17 kg/m    Gen: sitting in recliner, alert, cooperative and in no acute distress  HEENT: good hearing acuity  Resp: breathing non labored, no tachypnea  Ext: no LE edema; RUE in sling  Neuro: CX II-XII grossly in tact; ROM in all four extremities grossly in tact  Psych: alert and oriented x3; normal affect   Skin: left parietal scalp with scab from laceration, " there was a grape sized soft area distal to the scab and when I pressed on it, taya purulent material was easily expressed; no surrounding erythema      LABORATORY/IMAGING DATA:  Reviewed as per [facility] and/or [facility]    ASSESSMENT/PLAN:    Subacute T8 Compression Fracture (8/8/24)  Secondary to a fall. Pain is controlled. Non-op management per neurosurgery  -- analgesia with APAP 1000 mg TID, tramadol 25 mg q8h PRN  -- PT/OT  -- follow up with ortho as scheduled    Right Clavicle Fracture (8/13/24)  Right Supraspinous Scapular Fracture (8/13/24)  Secondary to a fall while in the hospital.   -- NWB to RUE with sling  -- analgesia with APAP 1000 mg TID, tramadol 25 mg q8h PRN  -- has follow up with [place] - talked with nursing to see if we could do plain films and have internal ortho follow; they just talked to [place] yesterday and they want to see her in clinic    Scalp Laceration (8/8/24)  She had a pocket of taya pus that was easily expressed. Skin around is not erythematous. It is not painful. Sutures remove don 8/15 in the hospital.   -- wound MD to see tomorrow - this does not look like cellulitis and hoping we can mange with warm compresses to let it drain    HTN  SBPs 130s  -- amlodipine 5 mg daily, furosemide 20 mg daily   -- follow BPs and adjust medications as needed    COPD  No signs of exacerbation  -- DuoNebs BID  -- guaifenesin 600 mg BID    CKD, Stage III  Baseline Cr 0.8-1.2. Cr 0.86 on 8/23.   -- periodic BMP    HLD  -- atorvastatin 20 mg daily    Hx TIA  -- ASA 81 mg daily, atorvastatin 20 mg daily    Chronic Fe Deficiency Anemia  Baseline Hgb 9-10. Hgb 9.4 today.   -- FeSO4 325 mg daily     Hx Breast Cancer s/p XRT  Cannot find details in the chart   -- noted for clinical significance     Hypothyroidisim  TSH 4.44  -- levothyroxine 112 mcg daily    GERD  -- omeprazole 20 mg BID    Slow Transit Constipation  -- bisacodyl supp daily PRN,  Miralax 17g BID and Senna-S 1 tab BID  -- adjust bowel  regimen as needed    Recurrent Falls  Physical Deconditioning  In setting of hospitalization and underlying medical conditions  -- ongoing PT/OT      Electronically signed by:  Annita Sanchez MD                          Sincerely,        Annita Sanchez MD

## 2024-08-28 NOTE — PROGRESS NOTES
"[facility] GERIATRICS  INITIAL VISIT NOTE  August 28, 2024      PRIMARY CARE PROVIDER AND CLINIC:  Bobby Quiroga [address] [facility] [address]    [facility] Medical Record Number:  [id number]  Place of Service where encounter took place:  [facility] (U) [[id number]]    Chief Complaint   Patient presents with    Hospital F/U       HPI:    Sussy Cole is a 89 year old  (1935[facility] Medical history is notable for HTN, COPD, TIA, bilateral carotid artery stenosis, CKD and breast cancer. She was hospitalized at [facility] from 8/8/2024 to 8/16/2024 where she presented after a fall and was found to have a subacute T8 compression fracture.  Neurosurgery recommended nonoperative management and follow-up in 12 weeks.  Hospital course complicated by a fall on 8/13/2024 with resultant right clavicular and right supraspinous scapular fracture.  Ortho consulted and recommended nonop management and follow-up in 2 weeks.  She sustained a scalp laceration from the initial fall and staples were removed on 8/15/2024. She was admitted to this facility for  rehab, medical management, and nursing care.      History obtained from: facility chart records, facility staff, patient report, and [facility] chart review.      Today, Ms. Cole is seen in her room sitting in her recliner.  Her back pain is controlled.  Her right clavicle \"barks\" from time to time, with it being most comfortable when her arm is bent with her fingers at her navel.  No chest pain or dyspnea.  She wanted me to look at her head as one of the staff commented that she had a bit of a bump.  Her goal is to return home.  She is working with therapies.  No acute concerns today per discussion with nursing.    CODE STATUS: DNR / DNI    ALLERGIES:  Allergies   Allergen Reactions    Hydrocodone-Acetaminophen Nausea and Vomiting    Amoxicillin Other (See Comments)     Sores in mouth, tongue " "slightly swollen    Hydrochlorothiazide Unknown    Levofloxacin Other (See Comments)     \"mouth gets raw\"    Morphine Nausea and Vomiting       PAST MEDICAL HISTORY:   Past Medical History:   Diagnosis Date    Breast cancer (H)     Closed compression fracture of L2 vertebra, initial encounter (H) 06/02/2021    COPD (chronic obstructive pulmonary disease) (H)     Duodenal ulcer 04/2020    Duodenal ulcer due to nonsteroidal anti-inflammatory drug (NSAID) 04/03/2020    Formatting of this note might be different from the original. 3-26-20  IR embolization on 3-29-20, multiple units of blood. Admit hgb  6.1. aleve stopped. Patient had been taking aleve with prednisone.     Gastrointestinal hemorrhage with melena 03/26/2020    Added automatically from request for surgery [id number] Formatting of this note might be different from the original. Added automatically from request for surgery [id number]     History of humerus fracture 04/29/2021    HTN (hypertension)     Osteoporosis     Status post shoulder surgery 03/24/2009 12/14/2007  Unremarkable.  Repeat in 10 years Formatting of this note might be different from the original. 12/14/2007  Unremarkable.  Repeat in 10 years     Thyroid disease        PAST SURGICAL HISTORY:   Past Surgical History:   Procedure Laterality Date    BACK SURGERY      BONE MARROW BIOPSY, BONE SPECIMEN, NEEDLE/TROCAR Right 4/14/2022    Procedure: UNILATERAL BONE MARROW BIOPSY, SIDE DETERMINDED DAY OF PROCEDURE, ILIAC CREST;  Surgeon: Enzo Mitchell MD;  Location: [facility] OR    IR MESENTERIC ANGIOGRAM  03/29/2020    IR VISCERAL ANGIOGRAM  3/29/2020    LUMPECTOMY BREAST      MIDLINE INSERTION - DOUBLE LUMEN  03/29/2020         MS ESOPHAGOGASTRODUODENOSCOPY TRANSORAL DIAGNOSTIC N/A 03/29/2020    Procedure: ESOPHAGOGASTRODUODENOSCOPY (EGD) with Epi injection and cautery;  Surgeon: Pacheco Echeverria DO;  Location: [facility];  Service: Gastroenterology       FAMILY HISTORY:   Family " History   Problem Relation Age of Onset    Glaucoma Brother        SOCIAL HISTORY:   Patient's living condition:  alone in a house    MEDICATIONS:  Post Discharge Medication Reconciliation Status: discharge medications reconciled and changed, per note/orders.  Current Outpatient Medications   Medication Sig Dispense Refill    acetaminophen (TYLENOL) 500 MG tablet Take 1,000 mg by mouth 3 times daily.      allopurinoL (ZYLOPRIM) 100 MG tablet [ALLOPURINOL (ZYLOPRIM) 100 MG TABLET] Take 100 mg by mouth 2 (two) times a day.       amLODIPine (NORVASC) 5 MG tablet Take 1 tablet by mouth daily      aspirin (ASA) 81 MG chewable tablet Take 1 tablet (81 mg) by mouth daily 90 tablet 0    atorvastatin (LIPITOR) 20 MG tablet TAKE 1 TABLET (20 MG) BY MOUTH DAILY 90 tablet 0    bisacodyl (DULCOLAX) 10 MG suppository Place 1 suppository (10 mg) rectally daily as needed for constipation      calcium-vitamin D3-vitamin K (VIACTIV) 650 mg-12.5 mcg-40 mcg Chew [CALCIUM-VITAMIN D3-VITAMIN K (VIACTIV) 650 MG-12.5 MCG-40 MCG CHEW] Chew 1 tablet 2 (two) times a day.       clotrimazole-betamethasone (LOTRISONE) 1-0.05 % external cream Apply topically 2 times daily as needed      diclofenac sodium (VOLTAREN) 1 % Gel [DICLOFENAC SODIUM (VOLTAREN) 1 % GEL] Apply 2 g topically 4 (four) times a day as needed. 2g to LUE, 4g to LLE  0    ferrous sulfate (FEROSUL) 325 (65 Fe) MG tablet Take 325 mg by mouth daily.      furosemide (LASIX) 20 MG tablet Take 20 mg by mouth daily      guaiFENesin (MUCINEX) 600 MG 12 hr tablet Take 600 mg by mouth 2 times daily.      hydrOXYzine HCl (ATARAX) 25 MG tablet Take 1 tablet (25 mg) by mouth every 6 hours as needed for anxiety.      ipratropium - albuterol 0.5 mg/2.5 mg/3 mL (DUONEB) 0.5-2.5 (3) MG/3ML neb solution Take 1 vial by nebulization 2 times daily      levothyroxine (SYNTHROID, LEVOTHROID) 112 MCG tablet [LEVOTHYROXINE (SYNTHROID, LEVOTHROID) 112 MCG TABLET] Take 112 mcg by mouth Daily at 6:00 am.     "   Lidocaine (LIDOCARE) 4 % Patch Place 2 patches onto the skin every 24 hours To prevent lidocaine toxicity, patient should be patch free for 12 hrs daily. 20 patch 0    multivitamin with minerals (THERA-M) 9 mg iron-400 mcg Tab tablet [MULTIVITAMIN WITH MINERALS (THERA-M) 9 MG IRON-400 MCG TAB TABLET] Take 1 tablet by mouth daily.       omeprazole (PRILOSEC) 20 MG capsule [OMEPRAZOLE (PRILOSEC) 20 MG CAPSULE] Take 1 capsule (20 mg total) by mouth 2 (two) times a day before meals. 60 capsule 0    polyethylene glycol (MIRALAX) 17 GM/Dose powder Take 17 g by mouth 2 times daily If stool hard or difficult to pass, continue BID. If loose stool 1-2x daily, continue current dose. If loose stool >2x daily, decrease dose to once daily. If continued loose stool >2x daily, change to BID PRN constipation.      senna-docusate (SENOKOT-S/PERICOLACE) 8.6-50 MG tablet Take 1 tablet by mouth 2 times daily If stool hard or difficult to pass, continue BID. If loose stool 1-2x daily, continue current dose. If loose stool >2x daily, decrease dose to once daily. If continued loose stool >2x daily, change to BID PRN constipation.      traMADol (ULTRAM) 50 MG tablet Take 25 mg by mouth every 8 hours as needed for severe pain.         ROS:  4 point ROS neg other than the symptoms noted above in the HPI.    PHYSICAL EXAM:  /60   Pulse 83   Temp 98  F (36.7  C)   Resp 16   Ht 1.575 m (5' 2\")   Wt 84.7 kg (186 lb 12.8 oz)   SpO2 95%   BMI 34.17 kg/m    Gen: sitting in recliner, alert, cooperative and in no acute distress  HEENT: good hearing acuity  Resp: breathing non labored, no tachypnea  Ext: no LE edema; RUE in sling  Neuro: CX II-XII grossly in tact; ROM in all four extremities grossly in tact  Psych: alert and oriented x3; normal affect   Skin: left parietal scalp with scab from laceration, there was a grape sized soft area distal to the scab and when I pressed on it, taya purulent material was easily expressed; no " surrounding erythema      LABORATORY/IMAGING DATA:  Reviewed as per [facility] and/or [facility]    ASSESSMENT/PLAN:    Subacute T8 Compression Fracture (8/8/24)  Secondary to a fall. Pain is controlled. Non-op management per neurosurgery  -- analgesia with APAP 1000 mg TID, tramadol 25 mg q8h PRN  -- PT/OT  -- follow up with ortho as scheduled    Right Clavicle Fracture (8/13/24)  Right Supraspinous Scapular Fracture (8/13/24)  Secondary to a fall while in the hospital.   -- NWB to RUE with sling  -- analgesia with APAP 1000 mg TID, tramadol 25 mg q8h PRN  -- has follow up with [place] - talked with nursing to see if we could do plain films and have internal ortho follow; they just talked to [place] yesterday and they want to see her in clinic    Scalp Laceration (8/8/24)  She had a pocket of taya pus that was easily expressed. Skin around is not erythematous. It is not painful. Sutures remove don 8/15 in the hospital.  -- wound MD to see tomorrow - this does not look like cellulitis and hoping we can mange with warm compresses to let it drain    ADDENDUM 8/29: Nursing able to express purulent fluid today with some odor. Will treat with doxycycline 100 mg BID x5 days. Warm compresses and express contents qshift and PRN    HTN  SBPs 130s  -- amlodipine 5 mg daily, furosemide 20 mg daily   -- follow BPs and adjust medications as needed    COPD  No signs of exacerbation  -- DuoNebs BID  -- guaifenesin 600 mg BID    CKD, Stage III  Baseline Cr 0.8-1.2. Cr 0.86 on 8/23.   -- periodic BMP    HLD  -- atorvastatin 20 mg daily    Hx TIA  -- ASA 81 mg daily, atorvastatin 20 mg daily    Chronic Fe Deficiency Anemia  Baseline Hgb 9-10. Hgb 9.4 today.   -- FeSO4 325 mg daily     Hx Breast Cancer s/p XRT  Cannot find details in the chart   -- noted for clinical significance     Hypothyroidisim  TSH 4.44  -- levothyroxine 112 mcg daily    GERD  -- omeprazole 20 mg BID    Slow Transit Constipation  -- bisacodyl supp daily PRN,   Miralax 17g BID and Senna-S 1 tab BID  -- adjust bowel regimen as needed    Recurrent Falls  Physical Deconditioning  In setting of hospitalization and underlying medical conditions  -- ongoing PT/OT      Electronically signed by:  Annita Sanchez MD

## 2024-08-29 RX ORDER — DOXYCYCLINE HYCLATE 100 MG
100 TABLET ORAL 2 TIMES DAILY
Qty: 10 TABLET | Refills: 0 | Status: SHIPPED | OUTPATIENT
Start: 2024-08-29 | End: 2024-09-04

## 2024-08-30 ENCOUNTER — TRANSITIONAL CARE UNIT VISIT (OUTPATIENT)
Dept: GERIATRICS | Facility: CLINIC | Age: 89
End: 2024-08-30
Payer: MEDICARE

## 2024-08-30 VITALS
HEART RATE: 90 BPM | RESPIRATION RATE: 16 BRPM | WEIGHT: 186.8 LBS | SYSTOLIC BLOOD PRESSURE: 117 MMHG | TEMPERATURE: 97.7 F | DIASTOLIC BLOOD PRESSURE: 53 MMHG | OXYGEN SATURATION: 93 % | BODY MASS INDEX: 34.37 KG/M2 | HEIGHT: 62 IN

## 2024-08-30 DIAGNOSIS — S01.01XS SCALP LACERATION, SEQUELA: Primary | ICD-10-CM

## 2024-08-30 DIAGNOSIS — S42.034D CLOSED NONDISPLACED FRACTURE OF ACROMIAL END OF RIGHT CLAVICLE WITH ROUTINE HEALING: ICD-10-CM

## 2024-08-30 PROCEDURE — 99309 SBSQ NF CARE MODERATE MDM 30: CPT | Performed by: NURSE PRACTITIONER

## 2024-08-30 NOTE — LETTER
8/30/2024      Sussy Cole  [address]  [place] [address]        [facility] GERIATRICS    Chief Complaint   Patient presents with     RECHECK     HPI:  Sussy Cole is a 89 year old  (1935), who is being seen today for an episodic care visit at: [facility] (U) [[id number]]. Patient admitted to the above facility from  [facility]. Hospital stay 8/8/24/ through 8/16/24. Patient with PMH TIA, intracranial atherosclerosis, carotid artery stenosis, COPD, anemia, HTN, CKD3 and breast cancer presented after a fall. She was found to have a T8 fracture. Neurosurgery recommended a brace x 12 weeks. She had another fall on 8/13 while in the hospital, found to have a minimally displaced R distal clavicle fracture and nondisplaced R scapula fracture. She is NWB to RUE, sling at all times.     Today's concern is:   Patient was unfortunately missed on wound rounds yesterday and nursing reported more pus from her scalp wound. On call provider ordered doxycycline x 5 days. She had her first dose today. Provider updates her regarding this. She is a little upset because no one told her this and when she asked nursing yesterday about the doctor coming to see her (the wound doctor) they said there was no doctor today. Provider apologizes for the miscommunication. Nursing was likely unaware that she was referring to the wound doctor. There is a tender lump on the crown of her head near her healed laceration. Provider advises her that the antibiotics will be completed the evening of 9/3. This provider will reassess her on 9/4 to determine if she still needs to be seen by the wound MD on 9/5.   She has several other questions about her right clavicle/shoulder fracture. She was using a lidocaine patch, but she has some redness on her skin and wonders if she is allergic. She does say there were 2 occasions that the patches were left on for 24 hours. She does have some skin  "irritation there, but it is not an allergic reaction. She does note that after refusing the patches for 2 days, she realizes they were helping. Her pain is worse without them. After discussion of risks and benefits, we agree to try a lidocaine cream instead. She has also received mixed communication on whether her fracture can be followed up here in house vs going out to ortho. Provider advises that we can order xrays here for next week, ortho provider can review them, and they will determine if she needs to go out to the clinic.     Allergies, and PMH/PSH reviewed in [facility] today.  REVIEW OF SYSTEMS:  4 point ROS including Respiratory, CV, GI and , other than that noted in the HPI,  is negative    Objective:   /53   Pulse 90   Temp 97.7  F (36.5  C)   Resp 16   Ht 1.575 m (5' 2\")   Wt 84.7 kg (186 lb 12.8 oz)   SpO2 93%   BMI 34.17 kg/m    GENERAL APPEARANCE:  Alert, in no distress  ENT:  Mouth and posterior oropharynx normal, moist mucous membranes  EYES:  EOM normal, conjunctiva and lids normal  RESP:  no respiratory distress  CV:  no edema  SKIN:  scalp laceration with tender lump and dried, crusty drainage. Right clavicle area with petechial rash  PSYCH:  oriented X 3    Recent labs in [facility] reviewed by me today.     Assessment/Plan:  (S01.01XS) Scalp laceration, sequela  (primary encounter diagnosis)  Comment: Provider is seeing this for the first time. There is likely more pus build up at the lump, but did not attempt to express this today due to pain. She does have orders for warm compresses in place.   Plan: Continue current POC with no changes at this time and adjustments as needed. Reassess 9/4/24    (S42.034D) Closed nondisplaced fracture of acromial end of right clavicle with routine healing  Comment: Skin is irritated from improper use of lidocaine patch, will discontinue this and try cream. Will order xrays for 9/3 and ask ortho NP to review      Orders:  Discontinue lidocaine " patch  Lidocaine cream BID to right clavicle  Xray of right clavicle and shoulder 9/3/24      Electronically signed by: JUNIE Wagoner CNP           Sincerely,        Elizabeth Vincent, APRN CNP

## 2024-08-30 NOTE — PROGRESS NOTES
[facility] GERIATRICS    Chief Complaint   Patient presents with    RECHECK     HPI:  Sussy Cole is a 89 year old  (1935), who is being seen today for an episodic care visit at: [facility] ([facility]) [[id number]]. Patient admitted to the above facility from  [facility]. Hospital stay 8/8/24/ through 8/16/24. Patient with PMH TIA, intracranial atherosclerosis, carotid artery stenosis, COPD, anemia, HTN, CKD3 and breast cancer presented after a fall. She was found to have a T8 fracture. Neurosurgery recommended a brace x 12 weeks. She had another fall on 8/13 while in the hospital, found to have a minimally displaced R distal clavicle fracture and nondisplaced R scapula fracture. She is NWB to RUE, sling at all times.     Today's concern is:   Patient was unfortunately missed on wound rounds yesterday and nursing reported more pus from her scalp wound. On call provider ordered doxycycline x 5 days. She had her first dose today. Provider updates her regarding this. She is a little upset because no one told her this and when she asked nursing yesterday about the doctor coming to see her (the wound doctor) they said there was no doctor today. Provider apologizes for the miscommunication. Nursing was likely unaware that she was referring to the wound doctor. There is a tender lump on the crown of her head near her healed laceration. Provider advises her that the antibiotics will be completed the evening of 9/3. This provider will reassess her on 9/4 to determine if she still needs to be seen by the wound MD on 9/5.   She has several other questions about her right clavicle/shoulder fracture. She was using a lidocaine patch, but she has some redness on her skin and wonders if she is allergic. She does say there were 2 occasions that the patches were left on for 24 hours. She does have some skin irritation there, but it is not an allergic reaction. She does note that after  "refusing the patches for 2 days, she realizes they were helping. Her pain is worse without them. After discussion of risks and benefits, we agree to try a lidocaine cream instead. She has also received mixed communication on whether her fracture can be followed up here in house vs going out to ortho. Provider advises that we can order xrays here for next week, ortho provider can review them, and they will determine if she needs to go out to the clinic.     Allergies, and PMH/PSH reviewed in [facility] today.  REVIEW OF SYSTEMS:  4 point ROS including Respiratory, CV, GI and , other than that noted in the HPI,  is negative    Objective:   /53   Pulse 90   Temp 97.7  F (36.5  C)   Resp 16   Ht 1.575 m (5' 2\")   Wt 84.7 kg (186 lb 12.8 oz)   SpO2 93%   BMI 34.17 kg/m    GENERAL APPEARANCE:  Alert, in no distress  ENT:  Mouth and posterior oropharynx normal, moist mucous membranes  EYES:  EOM normal, conjunctiva and lids normal  RESP:  no respiratory distress  CV:  no edema  SKIN:  scalp laceration with tender lump and dried, crusty drainage. Right clavicle area with petechial rash  PSYCH:  oriented X 3    Recent labs in [facility] reviewed by me today.     Assessment/Plan:  (S01.01XS) Scalp laceration, sequela  (primary encounter diagnosis)  Comment: Provider is seeing this for the first time. There is likely more pus build up at the lump, but did not attempt to express this today due to pain. She does have orders for warm compresses in place.   Plan: Continue current POC with no changes at this time and adjustments as needed. Reassess 9/4/24    (S42.034D) Closed nondisplaced fracture of acromial end of right clavicle with routine healing  Comment: Skin is irritated from improper use of lidocaine patch, will discontinue this and try cream. Will order xrays for 9/3 and ask ortho NP to review      Orders:  Discontinue lidocaine patch  Lidocaine cream BID to right clavicle  Xray of right clavicle and shoulder " 9/3/24      Electronically signed by: JUNIE Wagoner CNP

## 2024-08-30 NOTE — Clinical Note
I ordered xrays for Tuesday, but forgot to put your name on the order, sorry. Hopefully you can track down the images next week and advise me of any further orders. Thanks!

## 2024-09-03 ENCOUNTER — TELEPHONE (OUTPATIENT)
Dept: GERIATRICS | Facility: CLINIC | Age: 89
End: 2024-09-03
Payer: MEDICARE

## 2024-09-03 PROCEDURE — 99207 PR NO CHARGE LOS: CPT | Performed by: NURSE PRACTITIONER

## 2024-09-03 NOTE — TELEPHONE ENCOUNTER
[facility]- Ortho NP Review/Recommendations    Writer asked by patient's TCU NP (Elizabeth Vincent) to review updated x-rays and provide recommendations.  Sussy had a fall with resulting distal clavicle fracture and nondisplaced scapular fracture 8/8.  Was evaluated at hospital, non operative management recommended, made NWB RUE in sling.  History of previous proximal humerus fracture also noted but no acute fractures.    Xrays Reviewed  Taken by PPX on 9/3 at TCU.  1 view clavicle and 1 view shoulder (both AP).  Reviewed by me personally.  Clavicle fracture with evidence of bone formation, no worsening displacement when compared to previous images.  R proximal humerus fracture (old) is unchanged.              Plan:  -Lifting restriction to RUE: no lifting heavier than coffee cup  -sling at night and for comfort during day  -Pendulum exercises 4-5 times daily  -Avoid excessive reaching and external/internal rotation of arm  -Begin the following exercises in therapy: assisted shoulder forward elevation; assisted supported shoulder rotation  -Beginning 9/19, okay to increase weightbearing RUE to 10lbs and discontinue sling  -Repeat x-rays 9/23       JUNIE Boyce CNP on 9/3/2024 at 3:15 PM

## 2024-09-04 ENCOUNTER — TRANSITIONAL CARE UNIT VISIT (OUTPATIENT)
Dept: GERIATRICS | Facility: CLINIC | Age: 89
End: 2024-09-04
Payer: MEDICARE

## 2024-09-04 VITALS
BODY MASS INDEX: 34.78 KG/M2 | WEIGHT: 189 LBS | HEART RATE: 85 BPM | RESPIRATION RATE: 18 BRPM | DIASTOLIC BLOOD PRESSURE: 71 MMHG | HEIGHT: 62 IN | SYSTOLIC BLOOD PRESSURE: 148 MMHG | TEMPERATURE: 96.4 F | OXYGEN SATURATION: 91 %

## 2024-09-04 DIAGNOSIS — I10 PRIMARY HYPERTENSION: ICD-10-CM

## 2024-09-04 DIAGNOSIS — S01.01XS SCALP LACERATION, SEQUELA: ICD-10-CM

## 2024-09-04 DIAGNOSIS — S42.034D CLOSED NONDISPLACED FRACTURE OF ACROMIAL END OF RIGHT CLAVICLE WITH ROUTINE HEALING: Primary | ICD-10-CM

## 2024-09-04 PROCEDURE — 99309 SBSQ NF CARE MODERATE MDM 30: CPT | Performed by: NURSE PRACTITIONER

## 2024-09-04 NOTE — PROGRESS NOTES
"[facility] GERIATRICS    Chief Complaint   Patient presents with    RECHECK     HPI:  Sussy Cole is a 89 year old  (1935), who is being seen today for an episodic care visit at: [facility] (U) [[id number]]. Patient admitted to the above facility from  [facility]. Hospital stay 8/8/24/ through 8/16/24. Patient with PMH TIA, intracranial atherosclerosis, carotid artery stenosis, COPD, anemia, HTN, CKD3 and breast cancer presented after a fall. She was found to have a T8 fracture. Neurosurgery recommended a brace x 12 weeks. She had another fall on 8/13 while in the hospital, found to have a minimally displaced R distal clavicle fracture and nondisplaced R scapula fracture.     Today's concern is:   Patient had xrays of her clavicle fracture 9/3/24. Results reviewed by ortho (see note by Carli Garrison). Patient is updated today with the new orders. She says she prefers to wear the sling all the time, otherwise she forgets and goes to use her right arm and has pain. She been using the warm compresses on her scalp whenever nursing remembers. She has been trying to express fluid as well, she says today it was red. Provider advises her that she can stop pushing on it now. The infection appears resolved. She will be seen by the in-house wound MD tomorrow.     Allergies, and PMH/PSH reviewed in [facility] today.  REVIEW OF SYSTEMS:  4 point ROS including Respiratory, CV, GI and , other than that noted in the HPI,  is negative    Objective:   BP (!) 148/71   Pulse 85   Temp (!) 96.4  F (35.8  C)   Resp 18   Ht 1.575 m (5' 2\")   Wt 85.7 kg (189 lb)   SpO2 91%   BMI 34.57 kg/m    GENERAL APPEARANCE:  Alert, in no distress  RESP:  no respiratory distress  CV:  no edema  SKIN:  scab on back of scalp is intact, no swelling or erythema  PSYCH:  oriented X 3, affect and mood normal    Recent labs in [facility] reviewed by me today.       Assessment/Plan:  (E05.793G) Closed nondisplaced " fracture of acromial end of right clavicle with routine healing  (primary encounter diagnosis)  Comment: Fracture is healing, but she is not yet able to fully use the arm. She feels that she will not be safe to discharge until she is able to bear full weight. Next xrays are due 9/23/24. Ortho will review at that time and will likely upgrade to WBAT  Plan: Orders written per ortho NP note    (S01.01XS) Scalp laceration, sequela  Comment: Infection resolved. Scab is fairly large. Will defer to wound MD regarding debridement    (I10) Primary hypertension  Comment: Chronic, controlled  Plan: Continue current POC with no changes at this time and adjustments as needed.      Electronically signed by: JUNIE Wagoner CNP

## 2024-09-04 NOTE — LETTER
" 9/4/2024      Sussy Cole  [address]  [place] [address]        [facility] GERIATRICS    Chief Complaint   Patient presents with     RECHECK     HPI:  Sussy Cole is a 89 year old  (1935), who is being seen today for an episodic care visit at: [facility] (U) [[id number]]. Patient admitted to the above facility from  [facility]. Hospital stay 8/8/24/ through 8/16/24. Patient with PMH TIA, intracranial atherosclerosis, carotid artery stenosis, COPD, anemia, HTN, CKD3 and breast cancer presented after a fall. She was found to have a T8 fracture. Neurosurgery recommended a brace x 12 weeks. She had another fall on 8/13 while in the hospital, found to have a minimally displaced R distal clavicle fracture and nondisplaced R scapula fracture.     Today's concern is:   Patient had xrays of her clavicle fracture 9/3/24. Results reviewed by ortho (see note by Carli Garrison). Patient is updated today with the new orders. She says she prefers to wear the sling all the time, otherwise she forgets and goes to use her right arm and has pain. She been using the warm compresses on her scalp whenever nursing remembers. She has been trying to express fluid as well, she says today it was red. Provider advises her that she can stop pushing on it now. The infection appears resolved. She will be seen by the in-house wound MD tomorrow.     Allergies, and PMH/PSH reviewed in [facility] today.  REVIEW OF SYSTEMS:  4 point ROS including Respiratory, CV, GI and , other than that noted in the HPI,  is negative    Objective:   BP (!) 148/71   Pulse 85   Temp (!) 96.4  F (35.8  C)   Resp 18   Ht 1.575 m (5' 2\")   Wt 85.7 kg (189 lb)   SpO2 91%   BMI 34.57 kg/m    GENERAL APPEARANCE:  Alert, in no distress  RESP:  no respiratory distress  CV:  no edema  SKIN:  scab on back of scalp is intact, no swelling or erythema  PSYCH:  oriented X 3, affect and mood normal    Recent labs in " [facility] reviewed by me today.       Assessment/Plan:  (S42.034D) Closed nondisplaced fracture of acromial end of right clavicle with routine healing  (primary encounter diagnosis)  Comment: Fracture is healing, but she is not yet able to fully use the arm. She feels that she will not be safe to discharge until she is able to bear full weight. Next xrays are due 9/23/24. Ortho will review at that time and will likely upgrade to WBAT  Plan: Orders written per ortho NP note    (S01.01XS) Scalp laceration, sequela  Comment: Infection resolved. Scab is fairly large. Will defer to wound MD regarding debridement    (I10) Primary hypertension  Comment: Chronic, controlled  Plan: Continue current POC with no changes at this time and adjustments as needed.      Electronically signed by: JUNIE Wagoner CNP           Sincerely,        JUNIE Wagoner CNP

## 2024-09-11 ENCOUNTER — TRANSITIONAL CARE UNIT VISIT (OUTPATIENT)
Dept: GERIATRICS | Facility: CLINIC | Age: 89
End: 2024-09-11
Payer: MEDICARE

## 2024-09-11 ENCOUNTER — LAB REQUISITION (OUTPATIENT)
Dept: LAB | Facility: CLINIC | Age: 89
End: 2024-09-11
Payer: MEDICARE

## 2024-09-11 VITALS
RESPIRATION RATE: 16 BRPM | HEIGHT: 62 IN | OXYGEN SATURATION: 93 % | HEART RATE: 87 BPM | DIASTOLIC BLOOD PRESSURE: 82 MMHG | WEIGHT: 195.2 LBS | SYSTOLIC BLOOD PRESSURE: 146 MMHG | TEMPERATURE: 98.7 F | BODY MASS INDEX: 35.92 KG/M2

## 2024-09-11 DIAGNOSIS — D64.9 ANEMIA, UNSPECIFIED: ICD-10-CM

## 2024-09-11 DIAGNOSIS — R53.81 PHYSICAL DECONDITIONING: Primary | ICD-10-CM

## 2024-09-11 DIAGNOSIS — D53.9 MACROCYTIC ANEMIA: ICD-10-CM

## 2024-09-11 DIAGNOSIS — S42.034D CLOSED NONDISPLACED FRACTURE OF ACROMIAL END OF RIGHT CLAVICLE WITH ROUTINE HEALING: ICD-10-CM

## 2024-09-11 DIAGNOSIS — S22.069A CLOSED FRACTURE OF EIGHTH THORACIC VERTEBRA, UNSPECIFIED FRACTURE MORPHOLOGY, INITIAL ENCOUNTER (H): ICD-10-CM

## 2024-09-11 PROCEDURE — 99309 SBSQ NF CARE MODERATE MDM 30: CPT | Performed by: NURSE PRACTITIONER

## 2024-09-11 NOTE — LETTER
" 9/11/2024      Sussy Cole  [address]  [place] [address]        [facility] GERIATRICS    Chief Complaint   Patient presents with     RECHECK     HPI:  Sussy Cole is a 89 year old  (1935), who is being seen today for an episodic care visit at: [facility] ([facility]) [[id number]]. Patient admitted to the above facility from  [facility]. Hospital stay 8/8/24/ through 8/16/24. Patient with PMH TIA, intracranial atherosclerosis, carotid artery stenosis, COPD, anemia, HTN, CKD3 and breast cancer presented after a fall. She was found to have a T8 fracture. Neurosurgery recommended a brace x 12 weeks. She had another fall on 8/13 while in the hospital, found to have a minimally displaced R distal clavicle fracture.    Today's concern is:   Patient continues to make progress with therapy, but she is not interested in discharging home until she can bear full weight on her right arm. Her next xrays are due 9/23/24. She wishes it were sooner. She is no longer wearing the sling during the day. She is working on pendulum exercises. She can walk 150 feet with the hemiwalker. She is still wearing the back brace. She cancelled her follow up for this week because she does not want to pay for transportation there. She is interested in following up with them after discharge from [facility]. She has labs scheduled for later this month for her chronic anemia. She is worried her Hgb might be low because she feels light headed at times. Provider offers to check labs here, she would like this.     Allergies, and PMH/PSH reviewed in [facility] today.  REVIEW OF SYSTEMS:  4 point ROS including Respiratory, CV, GI and , other than that noted in the HPI,  is negative    Objective:   BP (!) 146/82   Pulse 87   Temp 98.7  F (37.1  C)   Resp 16   Ht 1.575 m (5' 2\")   Wt 88.5 kg (195 lb 3.2 oz)   SpO2 93%   BMI 35.70 kg/m    GENERAL APPEARANCE:  Alert, in no distress  ENT:  Mouth and " posterior oropharynx normal, moist mucous membranes  EYES:  EOM normal, conjunctiva and lids normal  RESP:  no respiratory distress  CV:  no edema  PSYCH:  oriented X 3, affect and mood normal    Recent labs in [facility] reviewed by me today.     Assessment/Plan:  (R53.81) Physical deconditioning  (primary encounter diagnosis)  Comment: Improving  Plan: PT/OT eval and treat, discharge planning per their recommendations.    (S40.308C) Closed nondisplaced fracture of acromial end of right clavicle with routine healing  Comment: Patient asked about having xrays moved up, but provider advised that doing them too soon would not be of benefit. If she decides she wants to discharge home prior to 9/23/24 she can, but otherwise will expect that she can WBAT after those xrays and discharge a day or two later  Plan: Continue current POC with no changes at this time and adjustments as needed.    (I27.291K) Closed fracture of eighth thoracic vertebra, unspecified fracture morphology, initial encounter (H)  Comment: No new symptoms. There is no urgency in following up with neurosurgery. Will schedule appointment when discharge date is confirmed    (D53.9) Macrocytic anemia  Comment: Chronic  Plan: Hgb in a.m.      Orders:  Hgb 9/12/24      Electronically signed by: JUNIE Wagoner CNP           Sincerely,        JUNIE Wagoner CNP

## 2024-09-11 NOTE — PROGRESS NOTES
"[facility] GERIATRICS    Chief Complaint   Patient presents with    RECHECK     HPI:  Sussy Cole is a 89 year old  (1935), who is being seen today for an episodic care visit at: [facility] (U) [[id number]]. Patient admitted to the above facility from  [facility]. Hospital stay 8/8/24/ through 8/16/24. Patient with PMH TIA, intracranial atherosclerosis, carotid artery stenosis, COPD, anemia, HTN, CKD3 and breast cancer presented after a fall. She was found to have a T8 fracture. Neurosurgery recommended a brace x 12 weeks. She had another fall on 8/13 while in the hospital, found to have a minimally displaced R distal clavicle fracture.    Today's concern is:   Patient continues to make progress with therapy, but she is not interested in discharging home until she can bear full weight on her right arm. Her next xrays are due 9/23/24. She wishes it were sooner. She is no longer wearing the sling during the day. She is working on pendulum exercises. She can walk 150 feet with the hemiwalker. She is still wearing the back brace. She cancelled her follow up for this week because she does not want to pay for transportation there. She is interested in following up with them after discharge from [facility]. She has labs scheduled for later this month for her chronic anemia. She is worried her Hgb might be low because she feels light headed at times. Provider offers to check labs here, she would like this.     Allergies, and PMH/PSH reviewed in [facility] today.  REVIEW OF SYSTEMS:  4 point ROS including Respiratory, CV, GI and , other than that noted in the HPI,  is negative    Objective:   BP (!) 146/82   Pulse 87   Temp 98.7  F (37.1  C)   Resp 16   Ht 1.575 m (5' 2\")   Wt 88.5 kg (195 lb 3.2 oz)   SpO2 93%   BMI 35.70 kg/m    GENERAL APPEARANCE:  Alert, in no distress  ENT:  Mouth and posterior oropharynx normal, moist mucous membranes  EYES:  EOM normal, conjunctiva and " lids normal  RESP:  no respiratory distress  CV:  no edema  PSYCH:  oriented X 3, affect and mood normal    Recent labs in [facility] reviewed by me today.     Assessment/Plan:  (R53.81) Physical deconditioning  (primary encounter diagnosis)  Comment: Improving  Plan: PT/OT eval and treat, discharge planning per their recommendations.    (S45.558Y) Closed nondisplaced fracture of acromial end of right clavicle with routine healing  Comment: Patient asked about having xrays moved up, but provider advised that doing them too soon would not be of benefit. If she decides she wants to discharge home prior to 9/23/24 she can, but otherwise will expect that she can WBAT after those xrays and discharge a day or two later  Plan: Continue current POC with no changes at this time and adjustments as needed.    (N82.451J) Closed fracture of eighth thoracic vertebra, unspecified fracture morphology, initial encounter (H)  Comment: No new symptoms. There is no urgency in following up with neurosurgery. Will schedule appointment when discharge date is confirmed    (D53.9) Macrocytic anemia  Comment: Chronic  Plan: Hgb in a.m.      Orders:  Hgb 9/12/24      Electronically signed by: JUNIE Wagoner CNP

## 2024-09-12 LAB — HGB BLD-MCNC: 8.3 G/DL (ref 11.7–15.7)

## 2024-09-12 PROCEDURE — 36415 COLL VENOUS BLD VENIPUNCTURE: CPT | Performed by: INTERNAL MEDICINE

## 2024-09-12 PROCEDURE — P9604 ONE-WAY ALLOW PRORATED TRIP: HCPCS | Performed by: INTERNAL MEDICINE

## 2024-09-12 PROCEDURE — 85018 HEMOGLOBIN: CPT | Performed by: INTERNAL MEDICINE

## 2024-09-23 ENCOUNTER — TRANSITIONAL CARE UNIT VISIT (OUTPATIENT)
Dept: GERIATRICS | Facility: CLINIC | Age: 89
End: 2024-09-23
Payer: MEDICARE

## 2024-09-23 VITALS
DIASTOLIC BLOOD PRESSURE: 82 MMHG | RESPIRATION RATE: 18 BRPM | HEART RATE: 86 BPM | WEIGHT: 191.1 LBS | OXYGEN SATURATION: 94 % | BODY MASS INDEX: 35.17 KG/M2 | SYSTOLIC BLOOD PRESSURE: 128 MMHG | TEMPERATURE: 97.2 F | HEIGHT: 62 IN

## 2024-09-23 DIAGNOSIS — R53.81 PHYSICAL DECONDITIONING: Primary | ICD-10-CM

## 2024-09-23 DIAGNOSIS — S42.034D CLOSED NONDISPLACED FRACTURE OF ACROMIAL END OF RIGHT CLAVICLE WITH ROUTINE HEALING: ICD-10-CM

## 2024-09-23 PROCEDURE — 99308 SBSQ NF CARE LOW MDM 20: CPT | Performed by: NURSE PRACTITIONER

## 2024-09-23 NOTE — LETTER
" 9/23/2024      Sussy Cole  [address]  [place] [address]        [facility] GERIATRICS    Chief Complaint   Patient presents with     RECHECK     HPI:  Sussy Cole is a 89 year old  (1935), who is being seen today for an episodic care visit at: [facility] ([facility]) [[id number]].     Today's concern is:   Patient has been making progress with therapy using the hemiwalker, but she did not feel comfortable discharging home until she could use her right arm. Repeat xrays were done today, reviewed with ortho, they gave orders for WBAT, no further follow up needed. Patient and staff are advised of this.     Allergies, and PMH/PSH reviewed in [facility] today.  REVIEW OF SYSTEMS:  4 point ROS including Respiratory, CV, GI and , other than that noted in the HPI,  is negative    Objective:   /82   Pulse 86   Temp 97.2  F (36.2  C)   Resp 18   Ht 1.575 m (5' 2\")   Wt 86.7 kg (191 lb 1.6 oz)   SpO2 94%   BMI 34.95 kg/m    GENERAL APPEARANCE:  Alert, in no distress  M/S:   no swelling or bruising RUE      Assessment/Plan:  (R53.81) Physical deconditioning  (primary encounter diagnosis)  (S42.962D) Closed nondisplaced fracture of acromial end of right clavicle with routine healing  Comment: Minimal pain. Fracture healing. Orders written for WBAT, no restrictions. Therapy will issue her a 2WW and likely choose a discharge date soon      Orders:  WBAT RUE      Electronically signed by: JUNIE Wagoner CNP           Sincerely,        JUNIE Wagoner CNP      "

## 2024-09-23 NOTE — PROGRESS NOTES
"[facility] GERIATRICS    Chief Complaint   Patient presents with    RECHECK     HPI:  Sussy Cole is a 89 year old  (1935), who is being seen today for an episodic care visit at: [facility] ([facility]) [[id number]].     Today's concern is:   Patient has been making progress with therapy using the hemiwalker, but she did not feel comfortable discharging home until she could use her right arm. Repeat xrays were done today, reviewed with ortho, they gave orders for WBAT, no further follow up needed. Patient and staff are advised of this.     Allergies, and PMH/PSH reviewed in EPIC today.  REVIEW OF SYSTEMS:  4 point ROS including Respiratory, CV, GI and , other than that noted in the HPI,  is negative    Objective:   /82   Pulse 86   Temp 97.2  F (36.2  C)   Resp 18   Ht 1.575 m (5' 2\")   Wt 86.7 kg (191 lb 1.6 oz)   SpO2 94%   BMI 34.95 kg/m    GENERAL APPEARANCE:  Alert, in no distress  M/S:   no swelling or bruising RUE      Assessment/Plan:  (R53.81) Physical deconditioning  (primary encounter diagnosis)  (S42.525D) Closed nondisplaced fracture of acromial end of right clavicle with routine healing  Comment: Minimal pain. Fracture healing. Orders written for WBAT, no restrictions. Therapy will issue her a 2WW and likely choose a discharge date soon      Orders:  WBAT RUE      Electronically signed by: JUNIE Wagoner CNP       "

## 2024-09-25 ENCOUNTER — DISCHARGE SUMMARY NURSING HOME (OUTPATIENT)
Dept: GERIATRICS | Facility: CLINIC | Age: 89
End: 2024-09-25
Payer: MEDICARE

## 2024-09-25 VITALS
HEART RATE: 87 BPM | BODY MASS INDEX: 35.17 KG/M2 | SYSTOLIC BLOOD PRESSURE: 139 MMHG | RESPIRATION RATE: 16 BRPM | WEIGHT: 191.1 LBS | TEMPERATURE: 97.2 F | OXYGEN SATURATION: 91 % | HEIGHT: 62 IN | DIASTOLIC BLOOD PRESSURE: 63 MMHG

## 2024-09-25 DIAGNOSIS — J44.9 CHRONIC OBSTRUCTIVE PULMONARY DISEASE, UNSPECIFIED COPD TYPE (H): ICD-10-CM

## 2024-09-25 DIAGNOSIS — N18.30 STAGE 3 CHRONIC KIDNEY DISEASE, UNSPECIFIED WHETHER STAGE 3A OR 3B CKD (H): ICD-10-CM

## 2024-09-25 DIAGNOSIS — I10 PRIMARY HYPERTENSION: ICD-10-CM

## 2024-09-25 DIAGNOSIS — R53.81 PHYSICAL DECONDITIONING: Primary | ICD-10-CM

## 2024-09-25 DIAGNOSIS — S22.069A CLOSED FRACTURE OF EIGHTH THORACIC VERTEBRA, UNSPECIFIED FRACTURE MORPHOLOGY, INITIAL ENCOUNTER (H): ICD-10-CM

## 2024-09-25 DIAGNOSIS — S42.034D CLOSED NONDISPLACED FRACTURE OF ACROMIAL END OF RIGHT CLAVICLE WITH ROUTINE HEALING: ICD-10-CM

## 2024-09-25 PROCEDURE — 99316 NF DSCHRG MGMT 30 MIN+: CPT | Performed by: NURSE PRACTITIONER

## 2024-09-25 NOTE — LETTER
9/25/2024      Sussy Cole  [address]  [place] [address]        [facility] GERIATRICS DISCHARGE SUMMARY  PATIENT'S NAME: Sussy Cole  YOB: 1935  MEDICAL RECORD NUMBER:  [id number]  Place of Service where encounter took place:  [facility] (TCU) [[id number]]    PRIMARY CARE PROVIDER AND CLINIC RESPONSIBLE AFTER TRANSFER:   Bobby Quiroga NP, [address] / [facility] [address]    Non-FMG Provider     Transferring providers: JUNIE Wagoner CNP, Annita Sanchez MD  Recent Hospitalization/ED:  [facility] stay 8/8/24 to 8/16/24.  Date of SNF Admission: August 16, 2024  Date of SNF (anticipated) Discharge: September 26, 2024  Discharged to: previous independent home      CODE STATUS/ADVANCE DIRECTIVES DISCUSSION:  No CPR- Pre-arrest intubation OK   ALLERGIES: Hydrocodone-acetaminophen, Amoxicillin, Hydrochlorothiazide, Levofloxacin, and Morphine    NURSING FACILITY COURSE   Medication Changes/Rationale:   none    Summary of nursing facility stay:   Sussy Cole  is a 89 year old  (1935), admitted to the above facility from  [facility]. Hospital stay 8/8/24/ through 8/16/24. Patient with PMH TIA, intracranial atherosclerosis, carotid artery stenosis, COPD, anemia, HTN, CKD3 and breast cancer presented after a fall. She was found to have a T8 fracture. Neurosurgery recommended a brace x 12 weeks. She had another fall on 8/13 while in the hospital, found to have a minimally displaced R distal clavicle fracture and nondisplaced R scapula fracture. She is NWB to RUE, sling at all times.     Physical deconditioning  Closed nondisplaced fracture of acromial end of right clavicle with routine healing  TCU was prolonged due to NWB on RUE. She did not feel comfortable discharging home without being able to use her right arm and a regular walker. Her activity restrictions were lifted on 9/23/24. She  feels comfortable discharging now. She has not really used the tramadol, but would like to take a few home just in case she gets more pain being more active at home.    Closed fracture of eighth thoracic vertebra, unspecified fracture morphology, initial encounter (H)  Patient did not want to pay for transportation to appointments while here. She plans to schedule follow up with neurosurgery. She will wear the back brace until then    Chronic obstructive pulmonary disease, unspecified COPD type (H)  No acute concerns while at TCU    Primary hypertension  Chronic, well controlled    Stage 3 chronic kidney disease, unspecified whether stage 3a or 3b CKD (H)  See labs      Discharge Medications:  Current Outpatient Medications   Medication Sig Dispense Refill     acetaminophen (TYLENOL) 500 MG tablet Take 1,000 mg by mouth 3 times daily.       allopurinoL (ZYLOPRIM) 100 MG tablet [ALLOPURINOL (ZYLOPRIM) 100 MG TABLET] Take 100 mg by mouth 2 (two) times a day.        amLODIPine (NORVASC) 5 MG tablet Take 1 tablet by mouth daily       aspirin (ASA) 81 MG chewable tablet Take 1 tablet (81 mg) by mouth daily 90 tablet 0     atorvastatin (LIPITOR) 20 MG tablet TAKE 1 TABLET (20 MG) BY MOUTH DAILY 90 tablet 0     bisacodyl (DULCOLAX) 10 MG suppository Place 1 suppository (10 mg) rectally daily as needed for constipation       calcium-vitamin D3-vitamin K (VIACTIV) 650 mg-12.5 mcg-40 mcg Chew [CALCIUM-VITAMIN D3-VITAMIN K (VIACTIV) 650 MG-12.5 MCG-40 MCG CHEW] Chew 1 tablet 2 (two) times a day.        diclofenac sodium (VOLTAREN) 1 % Gel [DICLOFENAC SODIUM (VOLTAREN) 1 % GEL] Apply 2 g topically 4 (four) times a day as needed. 2g to LUE, 4g to LLE  0     ferrous sulfate (FEROSUL) 325 (65 Fe) MG tablet Take 325 mg by mouth daily.       furosemide (LASIX) 20 MG tablet Take 20 mg by mouth daily       guaiFENesin (MUCINEX) 600 MG 12 hr tablet Take 600 mg by mouth 2 times daily.       ipratropium - albuterol 0.5 mg/2.5 mg/3 mL  (DUONEB) 0.5-2.5 (3) MG/3ML neb solution Take 1 vial by nebulization 2 times daily       levothyroxine (SYNTHROID, LEVOTHROID) 112 MCG tablet [LEVOTHYROXINE (SYNTHROID, LEVOTHROID) 112 MCG TABLET] Take 112 mcg by mouth Daily at 6:00 am.        multivitamin with minerals (THERA-M) 9 mg iron-400 mcg Tab tablet [MULTIVITAMIN WITH MINERALS (THERA-M) 9 MG IRON-400 MCG TAB TABLET] Take 1 tablet by mouth daily.        omeprazole (PRILOSEC) 20 MG capsule [OMEPRAZOLE (PRILOSEC) 20 MG CAPSULE] Take 1 capsule (20 mg total) by mouth 2 (two) times a day before meals. 60 capsule 0     polyethylene glycol (MIRALAX) 17 GM/Dose powder Take 17 g by mouth 2 times daily If stool hard or difficult to pass, continue BID. If loose stool 1-2x daily, continue current dose. If loose stool >2x daily, decrease dose to once daily. If continued loose stool >2x daily, change to BID PRN constipation.       senna-docusate (SENOKOT-S/PERICOLACE) 8.6-50 MG tablet Take 1 tablet by mouth 2 times daily If stool hard or difficult to pass, continue BID. If loose stool 1-2x daily, continue current dose. If loose stool >2x daily, decrease dose to once daily. If continued loose stool >2x daily, change to BID PRN constipation.       traMADol (ULTRAM) 50 MG tablet Take 25 mg by mouth every 8 hours as needed for severe pain.          Controlled medications:   OK to send remaining tramadol     Past Medical History:   Past Medical History:   Diagnosis Date     Breast cancer (H)      Closed compression fracture of L2 vertebra, initial encounter (H) 06/02/2021     COPD (chronic obstructive pulmonary disease) (H)      Duodenal ulcer 04/2020     Duodenal ulcer due to nonsteroidal anti-inflammatory drug (NSAID) 04/03/2020    Formatting of this note might be different from the original. 3-26-20  IR embolization on 3-29-20, multiple units of blood. Admit hgb  6.1. aleve stopped. Patient had been taking aleve with prednisone.      Gastrointestinal hemorrhage with melena  "03/26/2020    Added automatically from request for surgery [id number] Formatting of this note might be different from the original. Added automatically from request for surgery [id number]      History of humerus fracture 04/29/2021     HTN (hypertension)      Osteoporosis      Status post shoulder surgery 03/24/2009 12/14/2007  Unremarkable.  Repeat in 10 years Formatting of this note might be different from the original. 12/14/2007  Unremarkable.  Repeat in 10 years      Thyroid disease      Physical Exam:   Vitals: /63   Pulse 87   Temp 97.2  F (36.2  C)   Resp 16   Ht 1.575 m (5' 2\")   Wt 86.7 kg (191 lb 1.6 oz)   SpO2 91%   BMI 34.95 kg/m    BMI: Body mass index is 34.95 kg/m .  GENERAL APPEARANCE:  Alert, in no distress  ENT:  Mouth and posterior oropharynx normal, moist mucous membranes  EYES:  EOM normal, conjunctiva and lids normal  RESP:  no respiratory distress  CV:  no edema  PSYCH:  oriented X 3, affect and mood normal     SNF labs: Labs done in SNF are in [facility]. Please refer to them using [employer] Everywhere.    DISCHARGE PLAN:  Follow up labs: No labs orders/due  Medical Follow Up:      Follow up with primary care provider in 2-3 weeks  Discharge Services: Home Care:  Occupational Therapy, Physical Therapy, Registered Nurse, and Home Health Aide      TOTAL DISCHARGE TIME:   Greater than 30 minutes  Electronically signed by:  JUNIE Wagoner CNP       Documentation of Face to Face and Certification for Home Health Services    I certify that services are/were furnished while this patient was under the care of a physician and that a physician or an allowed non-physician practitioner (NPP), had a face-to-face encounter that meets the physician face-to-face encounter requirements. The encounter was in whole, or in part, related to the primary reason for home health. The patient is confined to his/her home and needs intermittent skilled nursing, physical therapy, speech-language " pathology, or the continued need for occupational therapy. A plan of care has been established by a physician and is periodically reviewed by a physician.  Date of Face-to-Face Encounter: 9/25/2024.    I certify that, based on my findings, the following services are medically necessary home health services: Nursing, Occupational Therapy, and Physical Therapy.    My clinical findings support the need for the above skilled services because: Requires assistance of another person or specialized equipment to access medical services because patient: Is unable to walk greater than 150 feet without rest...    Patient to re-establish plan of care with their PCP within 7-10 days after leaving the facility to reestablish care.  Medicare certified PECOS provider: JUNIE Wagoner CNP  Date: September 25, 2024                  Sincerely,        JUNIE Wagoner CNP

## 2024-09-25 NOTE — PROGRESS NOTES
[facility] GERIATRICS DISCHARGE SUMMARY  PATIENT'S NAME: Sussy Cole  YOB: 1935  MEDICAL RECORD NUMBER:  [id number]  Place of Service where encounter took place:  [facility] (U) [[id number]]    PRIMARY CARE PROVIDER AND CLINIC RESPONSIBLE AFTER TRANSFER:   Bobby Quiroga NP, [address] / [facility] [address]    Non-FMG Provider     Transferring providers: JUNIE Wagoner CNP, Annita Sanchez MD  Recent Hospitalization/ED:  [facility] stay 8/8/24 to 8/16/24.  Date of SNF Admission: August 16, 2024  Date of SNF (anticipated) Discharge: September 26, 2024  Discharged to: previous independent home      CODE STATUS/ADVANCE DIRECTIVES DISCUSSION:  No CPR- Pre-arrest intubation OK   ALLERGIES: Hydrocodone-acetaminophen, Amoxicillin, Hydrochlorothiazide, Levofloxacin, and Morphine    NURSING FACILITY COURSE   Medication Changes/Rationale:   none    Summary of nursing facility stay:   Sussy Cole  is a 89 year old  (1935), admitted to the above facility from  [facility]. Hospital stay 8/8/24/ through 8/16/24. Patient with PMH TIA, intracranial atherosclerosis, carotid artery stenosis, COPD, anemia, HTN, CKD3 and breast cancer presented after a fall. She was found to have a T8 fracture. Neurosurgery recommended a brace x 12 weeks. She had another fall on 8/13 while in the hospital, found to have a minimally displaced R distal clavicle fracture and nondisplaced R scapula fracture. She is NWB to RUE, sling at all times.     Physical deconditioning  Closed nondisplaced fracture of acromial end of right clavicle with routine healing  TCU was prolonged due to NWB on RUE. She did not feel comfortable discharging home without being able to use her right arm and a regular walker. Her activity restrictions were lifted on 9/23/24. She feels comfortable discharging now. She has not really used the tramadol, but would  like to take a few home just in case she gets more pain being more active at home.    Closed fracture of eighth thoracic vertebra, unspecified fracture morphology, initial encounter (H)  Patient did not want to pay for transportation to appointments while here. She plans to schedule follow up with neurosurgery. She will wear the back brace until then    Chronic obstructive pulmonary disease, unspecified COPD type (H)  No acute concerns while at TCU    Primary hypertension  Chronic, well controlled    Stage 3 chronic kidney disease, unspecified whether stage 3a or 3b CKD (H)  See labs      Discharge Medications:  Current Outpatient Medications   Medication Sig Dispense Refill    acetaminophen (TYLENOL) 500 MG tablet Take 1,000 mg by mouth 3 times daily.      allopurinoL (ZYLOPRIM) 100 MG tablet [ALLOPURINOL (ZYLOPRIM) 100 MG TABLET] Take 100 mg by mouth 2 (two) times a day.       amLODIPine (NORVASC) 5 MG tablet Take 1 tablet by mouth daily      aspirin (ASA) 81 MG chewable tablet Take 1 tablet (81 mg) by mouth daily 90 tablet 0    atorvastatin (LIPITOR) 20 MG tablet TAKE 1 TABLET (20 MG) BY MOUTH DAILY 90 tablet 0    bisacodyl (DULCOLAX) 10 MG suppository Place 1 suppository (10 mg) rectally daily as needed for constipation      calcium-vitamin D3-vitamin K (VIACTIV) 650 mg-12.5 mcg-40 mcg Chew [CALCIUM-VITAMIN D3-VITAMIN K (VIACTIV) 650 MG-12.5 MCG-40 MCG CHEW] Chew 1 tablet 2 (two) times a day.       diclofenac sodium (VOLTAREN) 1 % Gel [DICLOFENAC SODIUM (VOLTAREN) 1 % GEL] Apply 2 g topically 4 (four) times a day as needed. 2g to LUE, 4g to LLE  0    ferrous sulfate (FEROSUL) 325 (65 Fe) MG tablet Take 325 mg by mouth daily.      furosemide (LASIX) 20 MG tablet Take 20 mg by mouth daily      guaiFENesin (MUCINEX) 600 MG 12 hr tablet Take 600 mg by mouth 2 times daily.      ipratropium - albuterol 0.5 mg/2.5 mg/3 mL (DUONEB) 0.5-2.5 (3) MG/3ML neb solution Take 1 vial by nebulization 2 times daily       levothyroxine (SYNTHROID, LEVOTHROID) 112 MCG tablet [LEVOTHYROXINE (SYNTHROID, LEVOTHROID) 112 MCG TABLET] Take 112 mcg by mouth Daily at 6:00 am.       multivitamin with minerals (THERA-M) 9 mg iron-400 mcg Tab tablet [MULTIVITAMIN WITH MINERALS (THERA-M) 9 MG IRON-400 MCG TAB TABLET] Take 1 tablet by mouth daily.       omeprazole (PRILOSEC) 20 MG capsule [OMEPRAZOLE (PRILOSEC) 20 MG CAPSULE] Take 1 capsule (20 mg total) by mouth 2 (two) times a day before meals. 60 capsule 0    polyethylene glycol (MIRALAX) 17 GM/Dose powder Take 17 g by mouth 2 times daily If stool hard or difficult to pass, continue BID. If loose stool 1-2x daily, continue current dose. If loose stool >2x daily, decrease dose to once daily. If continued loose stool >2x daily, change to BID PRN constipation.      senna-docusate (SENOKOT-S/PERICOLACE) 8.6-50 MG tablet Take 1 tablet by mouth 2 times daily If stool hard or difficult to pass, continue BID. If loose stool 1-2x daily, continue current dose. If loose stool >2x daily, decrease dose to once daily. If continued loose stool >2x daily, change to BID PRN constipation.      traMADol (ULTRAM) 50 MG tablet Take 25 mg by mouth every 8 hours as needed for severe pain.          Controlled medications:   OK to send remaining tramadol     Past Medical History:   Past Medical History:   Diagnosis Date    Breast cancer (H)     Closed compression fracture of L2 vertebra, initial encounter (H) 06/02/2021    COPD (chronic obstructive pulmonary disease) (H)     Duodenal ulcer 04/2020    Duodenal ulcer due to nonsteroidal anti-inflammatory drug (NSAID) 04/03/2020    Formatting of this note might be different from the original. 3-26-20  IR embolization on 3-29-20, multiple units of blood. Admit hgb  6.1. aleve stopped. Patient had been taking aleve with prednisone.     Gastrointestinal hemorrhage with melena 03/26/2020    Added automatically from request for surgery [id number] Formatting of this note might be  "different from the original. Added automatically from request for surgery [id number]     History of humerus fracture 04/29/2021    HTN (hypertension)     Osteoporosis     Status post shoulder surgery 03/24/2009 12/14/2007  Unremarkable.  Repeat in 10 years Formatting of this note might be different from the original. 12/14/2007  Unremarkable.  Repeat in 10 years     Thyroid disease      Physical Exam:   Vitals: /63   Pulse 87   Temp 97.2  F (36.2  C)   Resp 16   Ht 1.575 m (5' 2\")   Wt 86.7 kg (191 lb 1.6 oz)   SpO2 91%   BMI 34.95 kg/m    BMI: Body mass index is 34.95 kg/m .  GENERAL APPEARANCE:  Alert, in no distress  ENT:  Mouth and posterior oropharynx normal, moist mucous membranes  EYES:  EOM normal, conjunctiva and lids normal  RESP:  no respiratory distress  CV:  no edema  PSYCH:  oriented X 3, affect and mood normal     SNF labs: Labs done in SNF are in [place]. Please refer to them using [employer]Care Everywhere.    DISCHARGE PLAN:  Follow up labs: No labs orders/due  Medical Follow Up:      Follow up with primary care provider in 2-3 weeks  Discharge Services: Home Care:  Occupational Therapy, Physical Therapy, Registered Nurse, and Home Health Aide      TOTAL DISCHARGE TIME:   Greater than 30 minutes  Electronically signed by:  JUNIE Wagoner CNP       Documentation of Face to Face and Certification for Home Health Services    I certify that services are/were furnished while this patient was under the care of a physician and that a physician or an allowed non-physician practitioner (NPP), had a face-to-face encounter that meets the physician face-to-face encounter requirements. The encounter was in whole, or in part, related to the primary reason for home health. The patient is confined to his/her home and needs intermittent skilled nursing, physical therapy, speech-language pathology, or the continued need for occupational therapy. A plan of care has been established by a physician " and is periodically reviewed by a physician.  Date of Face-to-Face Encounter: 9/25/2024.    I certify that, based on my findings, the following services are medically necessary home health services: Nursing, Occupational Therapy, and Physical Therapy.    My clinical findings support the need for the above skilled services because: Requires assistance of another person or specialized equipment to access medical services because patient: Is unable to walk greater than 150 feet without rest...    Patient to re-establish plan of care with their PCP within 7-10 days after leaving the facility to reestablish care.  Medicare certified PECOS provider: JUNIE Wagoner CNP  Date: September 25, 2024

## 2024-11-14 ENCOUNTER — TELEPHONE (OUTPATIENT)
Dept: NEUROSURGERY | Facility: CLINIC | Age: 89
End: 2024-11-14
Payer: MEDICARE

## 2024-11-14 NOTE — TELEPHONE ENCOUNTER
"[facility] Call Center    Phone Message    May a detailed message be left on voicemail: yes     Reason for Call: Other: Patient called requesting to schedule hospital follow up from 8/8/2024. She states she is very confused, that no one had followed up with her about what to do after discharge. Patient became emotional stating she has been at home wearing a brace since she was admitted. Hospital notes stated \"Follow up with Neurosurgery in 3 weeks for X Rays.\". Writer sending to clinic per protocols, as instructions required prior to scheduling.      Action Taken: Message routed to:  Other: [facility]    Travel Screening: Not Applicable     Date of Service:                                                                     "

## 2024-11-18 ENCOUNTER — HOSPITAL ENCOUNTER (OUTPATIENT)
Dept: RADIOLOGY | Facility: HOSPITAL | Age: 89
Discharge: HOME | End: 2024-11-18
Attending: NURSE PRACTITIONER | Admitting: NURSE PRACTITIONER
Payer: MEDICARE

## 2024-11-18 DIAGNOSIS — S22.069A CLOSED FRACTURE OF EIGHTH THORACIC VERTEBRA, UNSPECIFIED FRACTURE MORPHOLOGY, INITIAL ENCOUNTER (H): ICD-10-CM

## 2024-11-18 DIAGNOSIS — M48.10 DISH (DIFFUSE IDIOPATHIC SKELETAL HYPEROSTOSIS): ICD-10-CM

## 2024-11-18 PROCEDURE — 72070 X-RAY EXAM THORAC SPINE 2VWS: CPT

## 2024-11-19 ENCOUNTER — VIRTUAL VISIT (OUTPATIENT)
Dept: NEUROSURGERY | Facility: CLINIC | Age: 89
End: 2024-11-19
Payer: MEDICARE

## 2024-11-19 DIAGNOSIS — S22.069A CLOSED FRACTURE OF EIGHTH THORACIC VERTEBRA, UNSPECIFIED FRACTURE MORPHOLOGY, INITIAL ENCOUNTER (H): Primary | ICD-10-CM

## 2024-11-19 NOTE — PROGRESS NOTES
HPI: Sussy Cole is a 89 year old female with history of TIA, intracranial atherosclerosis, bilateral carotid artery stenosis, anemia, hypertension, chronic obstructive pulmonary disease, chronic kidney disease stage 3, and breast cancer s/p lumpectomy who presented for evaluation after a fall found to have subacute fracture T8 vertebral body.  Seen by neurosurgery and recommended nonoperative management. Was discharged from [facility] on 8/16/2024 with instructions to call [facility] to follow up in 3 weeks for XRs. Today is the first follow up appointment scheduled for her.     Pain at its worst when she is up and moving around a lot or bending over to put shoes and socks on. She is doing exercises with occupational therapy and that aggravates her back sometimes. Other than that, she does not have any pain. She has been wearing the brace for about 12 weeks, and within the last week has been weaning herself off of the brace. She currently only wears it when she is up walking longer distances or when she feels like she has increased pain.     Medical, surgical, family, and social history unchanged since prior exam.    ROS: 10 point ROS neg other than the symptoms noted above in the HPI.    Imaging:     EXAM: XR THORACIC SPINE 2 VIEWS  LOCATION: [facility]  DATE: 11/18/2024                                                                   IMPRESSION: Exaggerated thoracic kyphosis surrounding the chronic T8 and L2 compression deformities. No acute fracture. Flowing anterior osteophytes compatible with diffuse skeletal hyperostosis. Scattered degenerative change.     Assessment/Plan:  - Patient has been advised to wean from their brace by removing the brace for 1-2 hours a day, increasing each day by 1-2 hour increments.  If at any time during the wean you experience excessive fatigue, back pain or spasm, back down to the previous level for a day or so, then resume  schedule.  Once out of brace for a full 8 hours, discontinue altogether or wear only as needed for comfort.    - Call the clinic at [phone] for increased pain or any other questions and concerns.    Phone call start: 1403  Phone call end: 1409  Phone call duration: 6 minutes    Patient location: Home  Provider location: [facility]    Mirta Gudino [facility]  [address] [place] [id number]  Tel [phone]  Fax [phone]

## 2024-11-19 NOTE — NURSING NOTE
"Sussy Cole is a 89 year old female who presents for:  Chief Complaint   [facility]     [facility] follow [facility]. Wearing brace only when going out to get the mail.         Initial Vitals:  There were no vitals taken for this visit. Estimated body mass index is 34.95 kg/m  as calculated from the following:    Height as of 9/25/24: 5' 2\" (1.575 m).    Weight as of 9/25/24: 191 lb 1.6 oz (86.7 kg).    Nursing Comments:   In [facility]    "

## 2024-11-19 NOTE — LETTER
11/19/2024      Sussy Cole  [address]  [place] [address]      Dear Colleague,    Thank you for referring your patient, Sussy Cole, to the [facility]. Please see a copy of my visit note below.    HPI: Sussy Cole is a 89 year old female with history of TIA, intracranial atherosclerosis, bilateral carotid artery stenosis, anemia, hypertension, chronic obstructive pulmonary disease, chronic kidney disease stage 3, and breast cancer s/p lumpectomy who presented for evaluation after a fall found to have subacute fracture T8 vertebral body.  Seen by neurosurgery and recommended nonoperative management. Was discharged from [facility] on 8/16/2024 with instructions to call [facility] to follow up in 3 weeks for XRs. Today is the first follow up appointment scheduled for her.     Pain at its worst when she is up and moving around a lot or bending over to put shoes and socks on. She is doing exercises with occupational therapy and that aggravates her back sometimes. Other than that, she does not have any pain. She has been wearing the brace for about 12 weeks, and within the last week has been weaning herself off of the brace. She currently only wears it when she is up walking longer distances or when she feels like she has increased pain.     Medical, surgical, family, and social history unchanged since prior exam.    ROS: 10 point ROS neg other than the symptoms noted above in the HPI.    Imaging:     EXAM: XR THORACIC SPINE 2 VIEWS  LOCATION: [facility]  DATE: 11/18/2024                                                                   IMPRESSION: Exaggerated thoracic kyphosis surrounding the chronic T8 and L2 compression deformities. No acute fracture. Flowing anterior osteophytes compatible with diffuse skeletal hyperostosis. Scattered degenerative change.     Assessment/Plan:  - Patient has been advised to wean from  their brace by removing the brace for 1-2 hours a day, increasing each day by 1-2 hour increments.  If at any time during the wean you experience excessive fatigue, back pain or spasm, back down to the previous level for a day or so, then resume schedule.  Once out of brace for a full 8 hours, discontinue altogether or wear only as needed for comfort.    - Call the clinic at [phone] for increased pain or any other questions and concerns.    Phone call start: [phone]  Phone call end: 1409  Phone call duration: 6 minutes    Patient location: Home  Provider location: [facility]    Mirta Gudino CNP  [facility]  [address] [place] [id number]  Tel [phone]  Fax [phone]       Again, thank you for allowing me to participate in the care of your patient.        Sincerely,        JUNIE Mansfield CNP

## 2025-01-31 ENCOUNTER — HOSPITAL ENCOUNTER (OUTPATIENT)
Dept: CT IMAGING | Facility: HOSPITAL | Age: 89
Discharge: HOME | End: 2025-01-31
Payer: MEDICARE

## 2025-01-31 DIAGNOSIS — M54.2 CERVICAL PAIN (NECK): ICD-10-CM

## 2025-01-31 LAB — RADIOLOGIST FLAGS: ABNORMAL

## 2025-01-31 PROCEDURE — 72125 CT NECK SPINE W/O DYE: CPT

## (undated) DEVICE — DRSG GAUZE 4X4" 8044

## (undated) DEVICE — TAPE MICROFOAM 4" 1528-4

## (undated) DEVICE — SOL WATER IRRIG 1000ML BOTTLE 2F7114

## (undated) RX ORDER — FENTANYL CITRATE 50 UG/ML
INJECTION, SOLUTION INTRAMUSCULAR; INTRAVENOUS
Status: DISPENSED
Start: 2022-04-14

## (undated) RX ORDER — LIDOCAINE HYDROCHLORIDE 10 MG/ML
INJECTION, SOLUTION EPIDURAL; INFILTRATION; INTRACAUDAL; PERINEURAL
Status: DISPENSED
Start: 2022-04-14

## (undated) RX ORDER — PROPOFOL 10 MG/ML
INJECTION, EMULSION INTRAVENOUS
Status: DISPENSED
Start: 2022-04-14